# Patient Record
Sex: FEMALE | Race: WHITE | NOT HISPANIC OR LATINO | Employment: OTHER | URBAN - METROPOLITAN AREA
[De-identification: names, ages, dates, MRNs, and addresses within clinical notes are randomized per-mention and may not be internally consistent; named-entity substitution may affect disease eponyms.]

---

## 2017-05-26 ENCOUNTER — OFFICE VISIT (OUTPATIENT)
Dept: OBSTETRICS AND GYNECOLOGY | Facility: CLINIC | Age: 57
End: 2017-05-26
Payer: COMMERCIAL

## 2017-05-26 VITALS
HEIGHT: 62 IN | BODY MASS INDEX: 46.4 KG/M2 | DIASTOLIC BLOOD PRESSURE: 82 MMHG | SYSTOLIC BLOOD PRESSURE: 124 MMHG | WEIGHT: 252.13 LBS

## 2017-05-26 DIAGNOSIS — R21 RASH: ICD-10-CM

## 2017-05-26 DIAGNOSIS — N63.20 LEFT BREAST LUMP: ICD-10-CM

## 2017-05-26 DIAGNOSIS — Z01.419 ROUTINE GYNECOLOGICAL EXAMINATION: Primary | ICD-10-CM

## 2017-05-26 PROCEDURE — 99999 PR PBB SHADOW E&M-NEW PATIENT-LVL III: CPT | Mod: PBBFAC,,, | Performed by: OBSTETRICS & GYNECOLOGY

## 2017-05-26 PROCEDURE — 99386 PREV VISIT NEW AGE 40-64: CPT | Mod: S$GLB,,, | Performed by: OBSTETRICS & GYNECOLOGY

## 2017-05-26 RX ORDER — GABAPENTIN 300 MG/1
CAPSULE ORAL
Refills: 6 | COMMUNITY
Start: 2017-05-09 | End: 2020-07-28 | Stop reason: CLARIF

## 2017-05-26 RX ORDER — BLOOD SUGAR DIAGNOSTIC
STRIP MISCELLANEOUS
Refills: 3 | COMMUNITY
Start: 2017-05-06

## 2017-05-26 RX ORDER — IRBESARTAN 300 MG/1
300 TABLET ORAL DAILY
Refills: 3 | COMMUNITY
Start: 2017-04-25

## 2017-05-26 RX ORDER — AMLODIPINE BESYLATE 2.5 MG/1
TABLET ORAL
Refills: 3 | Status: ON HOLD | COMMUNITY
Start: 2017-05-20 | End: 2019-12-01 | Stop reason: HOSPADM

## 2017-05-26 RX ORDER — HYDROCHLOROTHIAZIDE 25 MG/1
25 TABLET ORAL DAILY
Refills: 3 | COMMUNITY
Start: 2017-05-09

## 2017-05-26 RX ORDER — PANTOPRAZOLE SODIUM 40 MG/1
TABLET, DELAYED RELEASE ORAL
Refills: 3 | COMMUNITY
Start: 2017-04-25 | End: 2019-01-11

## 2017-05-26 RX ORDER — LEVOTHYROXINE SODIUM 25 UG/1
25 TABLET ORAL DAILY
Refills: 3 | COMMUNITY
Start: 2017-05-06

## 2017-05-26 RX ORDER — PEN NEEDLE, DIABETIC 29 G X1/2"
NEEDLE, DISPOSABLE MISCELLANEOUS
Refills: 1 | COMMUNITY
Start: 2017-03-23

## 2017-05-26 RX ORDER — PEN NEEDLE, DIABETIC 31 GX5/16"
NEEDLE, DISPOSABLE MISCELLANEOUS
Refills: 3 | COMMUNITY
Start: 2017-03-10 | End: 2019-01-11 | Stop reason: SDUPTHER

## 2017-05-26 RX ORDER — TIZANIDINE 2 MG/1
TABLET ORAL
Refills: 0 | COMMUNITY
Start: 2017-04-25 | End: 2020-07-28 | Stop reason: CLARIF

## 2017-05-26 RX ORDER — TRAMADOL HYDROCHLORIDE 50 MG/1
TABLET ORAL
Refills: 0 | COMMUNITY
Start: 2017-05-05 | End: 2019-01-11

## 2017-05-26 RX ORDER — NYSTATIN AND TRIAMCINOLONE ACETONIDE 100000; 1 [USP'U]/G; MG/G
CREAM TOPICAL
Qty: 30 G | Refills: 1 | Status: SHIPPED | OUTPATIENT
Start: 2017-05-26 | End: 2018-05-26

## 2017-05-26 RX ORDER — GLIMEPIRIDE 4 MG/1
4 TABLET ORAL 2 TIMES DAILY
Refills: 3 | COMMUNITY
Start: 2017-05-06 | End: 2020-07-28 | Stop reason: CLARIF

## 2017-05-26 RX ORDER — LANCETS 28 GAUGE
EACH MISCELLANEOUS
Refills: 3 | COMMUNITY
Start: 2017-05-20

## 2017-05-26 RX ORDER — LEVALBUTEROL INHALATION SOLUTION 0.63 MG/3ML
SOLUTION RESPIRATORY (INHALATION)
Refills: 5 | COMMUNITY
Start: 2017-05-06 | End: 2018-03-22 | Stop reason: SDUPTHER

## 2017-05-26 RX ORDER — MELOXICAM 7.5 MG/1
TABLET ORAL
Refills: 0 | COMMUNITY
Start: 2017-05-17 | End: 2019-01-11 | Stop reason: SDUPTHER

## 2017-05-26 RX ORDER — FERROUS SULFATE 325(65) MG
TABLET ORAL
Refills: 3 | COMMUNITY
Start: 2017-05-06 | End: 2019-01-11

## 2017-05-26 RX ORDER — METFORMIN HYDROCHLORIDE 500 MG/1
500 TABLET ORAL 2 TIMES DAILY WITH MEALS
Refills: 3 | COMMUNITY
Start: 2017-05-20

## 2017-05-26 RX ORDER — INSULIN ASPART 100 [IU]/ML
INJECTION, SOLUTION INTRAVENOUS; SUBCUTANEOUS
Refills: 3 | COMMUNITY
Start: 2017-04-14 | End: 2019-06-20 | Stop reason: SDUPTHER

## 2017-05-26 RX ORDER — ATORVASTATIN CALCIUM 20 MG/1
20 TABLET, FILM COATED ORAL DAILY
Refills: 3 | COMMUNITY
Start: 2017-05-06

## 2017-05-26 NOTE — PROGRESS NOTES
"  Chief Complaint: Well Woman Exam, Decreased Libido     HPI:      Liset Bailon is a 56 y.o.  who presents for annual exam. She is currently complaining of decreased libido since her hysterectomy 14 years ago. States that she still feels in the mood "from time to time", but not as often as her . Has two adult sons living at home, one with special needs, and she does not always feel comfortable being intimate with her  when they are home. Feels much more aroused when she and her  are home along or on a trip alone. She denies vaginal dryness or pain with intercourse. Was Rx'd HRT after hysterectomy but never took the medication secondary to fears of increased cancer risks. When she does have sex with her  she does find it pleasurable.     Ms. Bailon is currently sexually active with a single male partner. She declines STD screening today. Patient does not have regular monthly menses. No LMP recorded. Patient has had a hysterectomy.    Hysterectomy 2/ AUB. No h/o abnormal pap smears.  MMG  showed a lump in left breast. Lumpectomy showed benign pathology per patient. She cannot remember where she had the mammogram done.     Past Medical History:   Diagnosis Date    Diabetes mellitus     Hyperlipidemia     Hypertension     Sleep apnea     Thyroid disease      Past Surgical History:   Procedure Laterality Date    APPENDECTOMY       SECTION      x3    HYSTERECTOMY       Social History     Social History    Marital status:      Spouse name: N/A    Number of children: N/A    Years of education: N/A     Occupational History    Not on file.     Social History Main Topics    Smoking status: Never Smoker    Smokeless tobacco: Not on file    Alcohol use Yes      Comment: occasional    Drug use: No    Sexual activity: No     Other Topics Concern    Not on file     Social History Narrative    No narrative on file     Family History   Problem Relation Age of " "Onset    Pancreatic cancer Mother     Breast cancer Neg Hx     Colon cancer Neg Hx     Ovarian cancer Neg Hx      OB History      Para Term  AB Living    3 3 3   3    SAB TAB Ectopic Multiple Live Births        3          ROS:     GENERAL: Denies unintentional weight gain or weight loss. Feeling well overall.   SKIN: Denies rash or lesions despite presence of rash on chest.  HEENT: Denies headaches, or vision changes.   CARDIOVASCULAR: Denies palpitations or chest pain.   RESPIRATORY: Denies shortness of breath or dyspnea on exertion.  BREASTS: Denies pain, lumps, or nipple discharge.   ABDOMEN: Denies abdominal pain, constipation, diarrhea, nausea, vomiting, or change in appetite.   URINARY: Denies frequency, dysuria, hematuria.  NEUROLOGIC: Denies syncope or weakness.   PSYCHIATRIC: Denies depression, anxiety or mood swings.      Physical Exam:      PHYSICAL EXAM:  /82   Ht 5' 2" (1.575 m)   Wt 114.4 kg (252 lb 1.5 oz)   BMI 46.11 kg/m²   Body mass index is 46.11 kg/m².     APPEARANCE: Well nourished, well developed, in no acute distress.  PSYCH: Appropriate mood and affect.  SKIN: No acne or hirsutism, candidal appearing rash across bilateral breasts.  NECK: Neck symmetric without masses or thyromegaly  NODES: No inguinal, axillary, or supraclavicular lymph node enlargement  CHEST: Normal respiratory effort.  ABDOMEN: Soft.  No tenderness or masses.   BREASTS: Symmetrical, pendulous.      PELVIC: Normal external genitalia without lesions.  Normal hair distribution.  Adequate perineal body, normal urethral meatus.  Vagina moist and smooth. Normal appearing vaginal cuff. No pelvic masses or tenderness palpated on BME.  No significant cystocele or rectocele.  EXTREMITIES: No edema.    Assessment/Plan:     Routine gynecological examination    Left breast lump  -     Mammo Digital Diagnostic Bilat with Danny; Future; Expected date: 2017    Rash  -     nystatin-triamcinolone (MYCOLOG II) " cream; Apply to affected area 2 times daily  Dispense: 30 g; Refill: 1          Counseling:     Patient was counseled today on current ASCCP pap guidelines, the recommendation for yearly pelvic exams, healthy diet and exercise routines, breast self awareness and annual mammograms. She is to see her PCP for other health maintenance.       Use of the [facility] Patient Portal discussed and encouraged during today's visit.

## 2017-07-20 ENCOUNTER — HOSPITAL ENCOUNTER (OUTPATIENT)
Dept: RADIOLOGY | Facility: OTHER | Age: 57
Discharge: HOME | End: 2017-07-20
Attending: OBSTETRICS & GYNECOLOGY
Payer: COMMERCIAL

## 2017-07-20 PROCEDURE — 77066 DX MAMMO INCL CAD BI: CPT | Mod: 26,,, | Performed by: RADIOLOGY

## 2017-07-20 PROCEDURE — 77062 BREAST TOMOSYNTHESIS BI: CPT | Mod: TC

## 2017-07-20 PROCEDURE — 77062 BREAST TOMOSYNTHESIS BI: CPT | Mod: 26,,, | Performed by: RADIOLOGY

## 2017-07-24 RX ORDER — FLUTICASONE PROPIONATE AND SALMETEROL 250; 50 UG/1; UG/1
1 POWDER RESPIRATORY (INHALATION) 2 TIMES DAILY
COMMUNITY
End: 2017-07-25 | Stop reason: SDUPTHER

## 2017-07-24 RX ORDER — ALBUTEROL SULFATE 90 UG/1
2 AEROSOL, METERED RESPIRATORY (INHALATION) EVERY 6 HOURS PRN
COMMUNITY
End: 2017-07-25 | Stop reason: SDUPTHER

## 2017-07-24 RX ORDER — ASPIRIN 81 MG/1
81 TABLET ORAL DAILY
Status: ON HOLD | COMMUNITY
End: 2019-12-01 | Stop reason: HOSPADM

## 2017-07-25 ENCOUNTER — OFFICE VISIT (OUTPATIENT)
Dept: PULMONOLOGY | Facility: CLINIC | Age: 57
End: 2017-07-25
Payer: COMMERCIAL

## 2017-07-25 VITALS
HEIGHT: 62 IN | SYSTOLIC BLOOD PRESSURE: 130 MMHG | DIASTOLIC BLOOD PRESSURE: 60 MMHG | OXYGEN SATURATION: 95 % | HEART RATE: 79 BPM | BODY MASS INDEX: 45.64 KG/M2 | WEIGHT: 248 LBS

## 2017-07-25 DIAGNOSIS — J96.11 CHRONIC RESPIRATORY FAILURE WITH HYPOXIA: ICD-10-CM

## 2017-07-25 DIAGNOSIS — E66.01 MORBID OBESITY, UNSPECIFIED OBESITY TYPE: ICD-10-CM

## 2017-07-25 DIAGNOSIS — Z78.9 NONSMOKER: ICD-10-CM

## 2017-07-25 DIAGNOSIS — J45.30 MILD PERSISTENT ASTHMA WITHOUT COMPLICATION: Primary | ICD-10-CM

## 2017-07-25 DIAGNOSIS — G47.33 OBSTRUCTIVE SLEEP APNEA SYNDROME: ICD-10-CM

## 2017-07-25 PROCEDURE — 99214 OFFICE O/P EST MOD 30 MIN: CPT | Mod: ,,, | Performed by: INTERNAL MEDICINE

## 2017-07-25 RX ORDER — ALBUTEROL SULFATE 90 UG/1
2 AEROSOL, METERED RESPIRATORY (INHALATION) EVERY 6 HOURS PRN
Qty: 18 G | Refills: 6 | Status: SHIPPED | OUTPATIENT
Start: 2017-07-25 | End: 2019-06-20 | Stop reason: SDUPTHER

## 2017-07-25 RX ORDER — FLUTICASONE PROPIONATE AND SALMETEROL 250; 50 UG/1; UG/1
1 POWDER RESPIRATORY (INHALATION) 2 TIMES DAILY
Qty: 1 EACH | Refills: 6 | Status: SHIPPED | OUTPATIENT
Start: 2017-07-25 | End: 2018-09-24 | Stop reason: SDUPTHER

## 2017-07-25 NOTE — PROGRESS NOTES
"HPI:    Here for follow up, overall doing well, no recent problems with her asthma, reports good compliance and benefit with her CPAP.    Pt asthma is currently stable with no increases in SOB, SOARES or wheezing.  There has been no increase in use of  rescue medications.  Have reviewed medications with pt including the roles of rescue and controlling medications.  All questions answered.  Pt reports no problems with technique with inhalers.  We discussed the possibility of de-escalation of therapy if asthma control remains stable.    ACT - 20    CPAP Therapy    CPAP therapy - 10    Date of sleep study - na RDI - na    Pt reports good compliance and benefit with the therapy.    Face to face visit with pt concerning their CPAP therapy.  I have stressed continued compliance with the treatment and all questions were answered.    Home Oxygen    Face to face visit with pt regarding their home oxygen.  We have discussed the need for oxygen including continuous use, prn use or night time use (as appropriate for the pt).  We discussed the need for compliance with the therapy. We will do recertifications as necessary.     Medications    Current Outpatient Prescriptions:     albuterol (PROAIR HFA) 90 mcg/actuation inhaler, Inhale 2 puffs into the lungs every 6 (six) hours as needed for Wheezing. Rescue, Disp: 18 g, Rfl: 6    amlodipine (NORVASC) 2.5 MG tablet, TK 1 T PO BID, Disp: , Rfl: 3    aspirin (ECOTRIN) 81 MG EC tablet, Take 81 mg by mouth once daily., Disp: , Rfl:     atorvastatin (LIPITOR) 20 MG tablet, TK 1 T PO QPM, Disp: , Rfl: 3    BD INSULIN SYRINGE ULTRA-FINE 0.5 mL 31 gauge x 5/16 Syrg, USE TO INJECT 4X DAILY, Disp: , Rfl: 1    BD ULTRA-FINE SUSHANT PEN NEEDLES 32 gauge x 5/32" Ndle, USE TID, Disp: , Rfl: 3    ferrous sulfate 325 mg (65 mg iron) Tab tablet, TK 1 T PO QD, Disp: , Rfl: 3    fluticasone-salmeterol 250-50 mcg/dose (ADVAIR) 250-50 mcg/dose diskus inhaler, Inhale 1 puff into the lungs 2 (two) " times daily. Controller, Disp: 1 each, Rfl: 6    FREESTYLE INSULINX TEST STRIPS Strp, TEST BLOOD SUGAR QID, Disp: , Rfl: 3    FREESTYLE LANCETS 28 gauge lancets, TEST QID, Disp: , Rfl: 3    gabapentin (NEURONTIN) 300 MG capsule, TAKE ONE CAPSULE BY MOUTH IN THE MORNING, ONE CAPSULE AT NOON, AND 2 CAPSULES BEFORE BED, Disp: , Rfl: 6    glimepiride (AMARYL) 4 MG tablet, TK 1 T PO D, Disp: , Rfl: 3    hydrochlorothiazide (HYDRODIURIL) 25 MG tablet, TK 1 T PO D, Disp: , Rfl: 3    irbesartan (AVAPRO) 300 MG tablet, TK 1 T PO D, Disp: , Rfl: 3    levalbuterol (XOPENEX) 0.63 mg/3 mL nebulizer solution, , Disp: , Rfl: 5    levothyroxine (SYNTHROID) 25 MCG tablet, TK 1 T PO D, Disp: , Rfl: 3    meloxicam (MOBIC) 7.5 MG tablet, TK 1 T PO D, Disp: , Rfl: 0    metformin (GLUCOPHAGE) 500 MG tablet, TK 1 T PO BID, Disp: , Rfl: 3    NOVOLOG 100 unit/mL injection, INJECT 15 UNITS INTO THE SKIN AFTER EACH MEAL., Disp: , Rfl: 3    nystatin-triamcinolone (MYCOLOG II) cream, Apply to affected area 2 times daily, Disp: 30 g, Rfl: 1    pantoprazole (PROTONIX) 40 MG tablet, TK 1 T PO D, Disp: , Rfl: 3    tizanidine (ZANAFLEX) 2 MG tablet, TK 1 T PO Q 8 H PRN, Disp: , Rfl: 0    tramadol (ULTRAM) 50 mg tablet, TK 1 T PO   D, Disp: , Rfl: 0    Allergies  Review of patient's allergies indicates:   Allergen Reactions    Benadryl allergy decongestant     Codeine        Social History    History   Smoking Status    Never Smoker   Smokeless Tobacco    Never Used     ETOH - 1-2 drinks per week.  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   HENT: Negative for congestion.    Eyes: Negative for pain.  "  Respiratory: Negative for cough, hemoptysis, sputum production, shortness of breath, wheezing and stridor.    Cardiovascular: Negative for chest pain, palpitations, orthopnea, claudication, leg swelling and PND.   Gastrointestinal: Negative for abdominal pain, constipation, diarrhea, heartburn, nausea and vomiting.   Genitourinary: Negative for dysuria, frequency and urgency.   Musculoskeletal: Negative for falls and myalgias.   Neurological: Negative for sensory change, focal weakness and weakness.   Psychiatric/Behavioral: Negative for depression. The patient is not nervous/anxious.        Physical Exam    Vitals:    07/25/17 1326   BP: 130/60   Pulse: 79   SpO2: 95%   Weight: 112.5 kg (248 lb)   Height: 5' 2" (1.575 m)       Physical Exam   Constitutional: She is oriented to person, place, and time. She appears well-developed and well-nourished. No distress.   obese   HENT:   Head: Normocephalic and atraumatic.   Right Ear: External ear normal.   Left Ear: External ear normal.   Nose: Nose normal.   Mouth/Throat: Oropharynx is clear and moist.   Eyes: Conjunctivae and EOM are normal. Pupils are equal, round, and reactive to light.   Neck: Normal range of motion. Neck supple. No JVD present. No tracheal deviation present. No thyromegaly present.   Cardiovascular: Normal rate, regular rhythm and intact distal pulses.  Exam reveals no gallop and no friction rub.    Murmur heard.  2/6 REMIGIO   Pulmonary/Chest: Effort normal and breath sounds normal. No stridor. No respiratory distress. She has no wheezes. She has no rales. She exhibits no tenderness.   Abdominal: Soft. Bowel sounds are normal. She exhibits no distension. There is no tenderness.   Musculoskeletal: Normal range of motion. She exhibits no edema or tenderness.   Lymphadenopathy:     She has no cervical adenopathy.   Neurological: She is alert and oriented to person, place, and time. No cranial nerve deficit.   Skin: Skin is warm and dry. She is not " diaphoretic.   Psychiatric: She has a normal mood and affect. Her behavior is normal.   Nursing note and vitals reviewed.      Lab      Xray  Imaging Results    None         Impression/Plan  Problem List Items Addressed This Visit        Neuro    Obstructive sleep apnea syndrome  - stable with present therapy       Pulmonary    Chronic respiratory failure with hypoxia  - continue with O2 prn and qhs    Mild persistent asthma without complication - Primary  - doing well on present treatments  - RTC 6 months    Relevant Medications    albuterol (PROAIR HFA) 90 mcg/actuation inhaler    fluticasone-salmeterol 250-50 mcg/dose (ADVAIR) 250-50 mcg/dose diskus inhaler       Fluids/Electrolytes/Nutrition/GI    Morbid obesity  - Discussed with pt at length about the need to work on diet and weight loss.  Have offered suggestions on approaches for this problem.  Also discussed the need to start a regular exercise program.  We discussed at length the importance of regular exercise and working at getting to a healthier weight.  All questions answered.  Pt voices understanding of these principles and hopefully will take action.       Other    Never smoker      Other Visit Diagnoses    None.

## 2017-09-25 ENCOUNTER — TELEPHONE (OUTPATIENT)
Dept: PULMONOLOGY | Facility: CLINIC | Age: 57
End: 2017-09-25

## 2017-09-26 RX ORDER — AZITHROMYCIN 250 MG/1
TABLET, FILM COATED ORAL
Qty: 6 TABLET | Refills: 0 | Status: SHIPPED | OUTPATIENT
Start: 2017-09-26 | End: 2018-03-12 | Stop reason: SDUPTHER

## 2018-02-02 PROBLEM — M25.512 PAIN IN JOINT OF LEFT SHOULDER REGION: Status: ACTIVE | Noted: 2018-02-02

## 2018-03-09 ENCOUNTER — TELEPHONE (OUTPATIENT)
Dept: PULMONOLOGY | Facility: CLINIC | Age: 58
End: 2018-03-09

## 2018-03-12 RX ORDER — AZITHROMYCIN 250 MG/1
TABLET, FILM COATED ORAL
Qty: 6 TABLET | Refills: 0 | Status: SHIPPED | OUTPATIENT
Start: 2018-03-12 | End: 2018-12-10 | Stop reason: SDUPTHER

## 2018-03-22 ENCOUNTER — TELEPHONE (OUTPATIENT)
Dept: PULMONOLOGY | Facility: CLINIC | Age: 58
End: 2018-03-22

## 2018-03-22 RX ORDER — LEVALBUTEROL INHALATION SOLUTION 0.63 MG/3ML
1 SOLUTION RESPIRATORY (INHALATION) 3 TIMES DAILY PRN
Qty: 1 BOX | Refills: 5 | Status: SHIPPED | OUTPATIENT
Start: 2018-03-22 | End: 2018-08-18 | Stop reason: SDUPTHER

## 2018-05-30 PROBLEM — M25.512 PAIN IN JOINT OF LEFT SHOULDER REGION: Status: RESOLVED | Noted: 2018-02-02 | Resolved: 2018-05-30

## 2018-08-20 RX ORDER — LEVALBUTEROL INHALATION SOLUTION 0.63 MG/3ML
SOLUTION RESPIRATORY (INHALATION)
Qty: 75 ML | Refills: 6 | Status: SHIPPED | OUTPATIENT
Start: 2018-08-20 | End: 2018-09-17 | Stop reason: SDUPTHER

## 2018-09-12 PROBLEM — M75.02 ADHESIVE CAPSULITIS OF LEFT SHOULDER: Status: ACTIVE | Noted: 2018-09-12

## 2018-09-17 RX ORDER — LEVALBUTEROL INHALATION SOLUTION 0.63 MG/3ML
SOLUTION RESPIRATORY (INHALATION)
Qty: 75 ML | Refills: 6 | Status: SHIPPED | OUTPATIENT
Start: 2018-09-17 | End: 2019-05-04 | Stop reason: SDUPTHER

## 2018-09-24 DIAGNOSIS — J45.30 MILD PERSISTENT ASTHMA WITHOUT COMPLICATION: ICD-10-CM

## 2018-09-25 RX ORDER — FLUTICASONE PROPIONATE AND SALMETEROL 50; 250 UG/1; UG/1
POWDER RESPIRATORY (INHALATION)
Qty: 1 EACH | Refills: 11 | Status: SHIPPED | OUTPATIENT
Start: 2018-09-25 | End: 2019-06-20 | Stop reason: SDUPTHER

## 2018-10-04 ENCOUNTER — TELEPHONE (OUTPATIENT)
Dept: PULMONOLOGY | Facility: CLINIC | Age: 58
End: 2018-10-04

## 2018-10-04 NOTE — TELEPHONE ENCOUNTER
The office cancelled todays appointment, offered the patient 10/8, booked, then called with death in her family and cancelled the 10/8/18 appt. Offered the 15th or 22nd of October, declined because on vacation, so rescheduled 11/5/18 @ 11:30. Called today wanting to change that appt also, explained at this time the next appt is Dec 26th, got upset, sis not wish to wait that long, feels like we should work her in another afternoon of her choice due to us cancelling the original appt., explained that was not possible, so at this time she will remain on 11/5/18.

## 2018-12-10 ENCOUNTER — TELEPHONE (OUTPATIENT)
Dept: PULMONOLOGY | Facility: CLINIC | Age: 58
End: 2018-12-10

## 2018-12-10 RX ORDER — AZITHROMYCIN 250 MG/1
TABLET, FILM COATED ORAL
Qty: 6 TABLET | Refills: 0 | Status: SHIPPED | OUTPATIENT
Start: 2018-12-10 | End: 2019-01-11

## 2018-12-10 RX ORDER — PREDNISONE 10 MG/1
TABLET ORAL
Qty: 18 TABLET | Refills: 0 | Status: SHIPPED | OUTPATIENT
Start: 2018-12-10 | End: 2019-01-11

## 2018-12-18 ENCOUNTER — TELEPHONE (OUTPATIENT)
Dept: OBSTETRICS AND GYNECOLOGY | Facility: CLINIC | Age: 58
End: 2018-12-18

## 2018-12-18 NOTE — TELEPHONE ENCOUNTER
Pt is scheduled for annual appt on 1/11/19 and would like mammo orders entered. She will call insurance company to see where they will cover her to have it done and call us back.

## 2019-01-08 NOTE — PROGRESS NOTES
"Chief Complaint: Well Woman Exam     HPI:      Liset Bailon is a 58 y.o.  who presents today for well woman exam.  LMP: No LMP recorded. Patient has had a hysterectomy.  No issues, problems, or complaints. Specifically, patient denies abnormal vaginal bleeding, discharge, pelvic pain, urinary problems, or changes in appetite. Ms. Bailon is currently sexually active with a single male partner.    Previous Pap:  Hysterectomy for benign indications, has never had an abnormal pap (No result found)  Previous Mammogram: [facility] 2 (2017)  Colonoscopy: 5+ years ago at , polyps removed    OB History      Para Term  AB Living    3 3 3     3    SAB TAB Ectopic Multiple Live Births            3        ROS:     GENERAL: Denies unintentional weight gain or weight loss. Feeling well overall.   SKIN: Denies rash or lesions.   HEENT: Denies headaches, or vision changes.   CARDIOVASCULAR: Denies palpitations or chest pain.   RESPIRATORY: Denies shortness of breath or dyspnea on exertion.  BREASTS: Denies pain, lumps, or nipple discharge.   ABDOMEN: Denies abdominal pain, constipation, diarrhea, nausea, vomiting, change in appetite.  URINARY: Denies frequency, dysuria, hematuria.  NEUROLOGIC: Denies syncope or weakness.   PSYCHIATRIC: Denies depression, anxiety or mood swings.    Physical Exam:      PHYSICAL EXAM:  /84   Ht 5' 2" (1.575 m)   Wt 120 kg (264 lb 7.1 oz)   BMI 48.37 kg/m²   Body mass index is 48.37 kg/m².     APPEARANCE: Well nourished, well developed, in no acute distress.  PSYCH: Appropriate mood and affect.  SKIN: No acne or hirsutism  NECK: Neck symmetric without masses or thyromegaly  NODES: No inguinal, axillary, or supraclavicular lymph node enlargement  ABDOMEN: Soft.  No tenderness or masses.    CARDIOVASCULAR: No edema of peripheral extremities  BREASTS: Symmetrical. Both breasts with diffuse erythema patches and scaly peeling skin, appears c/w yeast. No palpable masses or nipple " discharge bilaterally.  PELVIC: Normal external genitalia without lesions.  Normal hair distribution.  Adequate perineal body, normal urethral meatus.  Vagina moist and well rugated without lesions or discharge. Normal appearance of vaginal cuff. No significant cystocele or rectocele.  Bimanual exam severely limited by body habitus, but no adnexal tenderness noted.     Assessment/Plan:     Encounter for annual routine gynecological examination    Breast cancer screening  -     Mammo Digital Screening Bilat w/ Danny; Future; Expected date: 01/11/2019    Yeast dermatitis  -     nystatin-triamcinolone (MYCOLOG II) cream; Apply to affected area 2 times daily  Dispense: 30 g; Refill: 1      Counseling:     Patient was counseled today on current ASCCP pap guidelines, the recommendation for yearly pelvic exams, healthy diet and exercise routines, breast self awareness and annual mammograms.She is to see her PCP for other health maintenance.     Use of the [employer] Patient Portal discussed and encouraged during today's visit.

## 2019-01-11 ENCOUNTER — OFFICE VISIT (OUTPATIENT)
Dept: OBSTETRICS AND GYNECOLOGY | Facility: CLINIC | Age: 59
End: 2019-01-11
Payer: COMMERCIAL

## 2019-01-11 VITALS
HEIGHT: 62 IN | DIASTOLIC BLOOD PRESSURE: 84 MMHG | SYSTOLIC BLOOD PRESSURE: 132 MMHG | BODY MASS INDEX: 48.66 KG/M2 | WEIGHT: 264.44 LBS

## 2019-01-11 DIAGNOSIS — Z12.39 BREAST CANCER SCREENING: ICD-10-CM

## 2019-01-11 DIAGNOSIS — Z01.419 ENCOUNTER FOR ANNUAL ROUTINE GYNECOLOGICAL EXAMINATION: Primary | ICD-10-CM

## 2019-01-11 DIAGNOSIS — B37.2 YEAST DERMATITIS: ICD-10-CM

## 2019-01-11 PROCEDURE — 99999 PR PBB SHADOW E&M-EST. PATIENT-LVL III: ICD-10-PCS | Mod: PBBFAC,,, | Performed by: OBSTETRICS & GYNECOLOGY

## 2019-01-11 PROCEDURE — 99396 PR PREVENTIVE VISIT,EST,40-64: ICD-10-PCS | Mod: S$GLB,,, | Performed by: OBSTETRICS & GYNECOLOGY

## 2019-01-11 PROCEDURE — 99396 PREV VISIT EST AGE 40-64: CPT | Mod: S$GLB,,, | Performed by: OBSTETRICS & GYNECOLOGY

## 2019-01-11 PROCEDURE — 99999 PR PBB SHADOW E&M-EST. PATIENT-LVL III: CPT | Mod: PBBFAC,,, | Performed by: OBSTETRICS & GYNECOLOGY

## 2019-01-11 RX ORDER — TIZANIDINE HYDROCHLORIDE 2 MG/1
CAPSULE, GELATIN COATED ORAL
COMMUNITY
End: 2019-01-11 | Stop reason: SDUPTHER

## 2019-01-11 RX ORDER — SULFAMETHOXAZOLE AND TRIMETHOPRIM 800; 160 MG/1; MG/1
TABLET ORAL
COMMUNITY
End: 2019-01-11

## 2019-01-11 RX ORDER — CYCLOBENZAPRINE HCL 5 MG
TABLET ORAL
COMMUNITY
End: 2019-01-11 | Stop reason: SDUPTHER

## 2019-01-11 RX ORDER — INSULIN GLARGINE 100 [IU]/ML
35 INJECTION, SOLUTION SUBCUTANEOUS DAILY
COMMUNITY

## 2019-01-11 RX ORDER — DICLOFENAC EPOLAMINE 0.01 G/1
SYSTEM TOPICAL
COMMUNITY
End: 2019-01-11

## 2019-01-11 RX ORDER — LANCETS 28 GAUGE
EACH MISCELLANEOUS
COMMUNITY

## 2019-01-11 RX ORDER — IRBESARTAN 300 MG/1
TABLET ORAL
COMMUNITY
End: 2019-01-11 | Stop reason: SDUPTHER

## 2019-01-11 RX ORDER — CYCLOBENZAPRINE HCL 10 MG
TABLET ORAL
Refills: 0 | COMMUNITY
Start: 2018-10-30 | End: 2019-01-11 | Stop reason: SDUPTHER

## 2019-01-11 RX ORDER — DIAZEPAM 5 MG/1
TABLET ORAL
Status: ON HOLD | COMMUNITY
End: 2019-12-01 | Stop reason: HOSPADM

## 2019-01-11 RX ORDER — PANTOPRAZOLE SODIUM 40 MG/1
TABLET, DELAYED RELEASE ORAL
COMMUNITY
End: 2019-01-11

## 2019-01-11 RX ORDER — VALACYCLOVIR HYDROCHLORIDE 1 G/1
TABLET, FILM COATED ORAL
COMMUNITY
End: 2019-01-11

## 2019-01-11 RX ORDER — OMEPRAZOLE 20 MG/1
20 CAPSULE, DELAYED RELEASE ORAL DAILY
Refills: 1 | COMMUNITY
Start: 2018-12-30

## 2019-01-11 RX ORDER — INSULIN ASPART 100 [IU]/ML
INJECTION, SOLUTION INTRAVENOUS; SUBCUTANEOUS
COMMUNITY
End: 2020-07-28 | Stop reason: CLARIF

## 2019-01-11 RX ORDER — NYSTATIN AND TRIAMCINOLONE ACETONIDE 100000; 1 [USP'U]/G; MG/G
CREAM TOPICAL
COMMUNITY
End: 2019-01-11 | Stop reason: SDUPTHER

## 2019-01-11 RX ORDER — INSULIN GLARGINE 100 [IU]/ML
INJECTION, SOLUTION SUBCUTANEOUS
Refills: 7 | COMMUNITY
Start: 2018-11-24 | End: 2020-07-28 | Stop reason: CLARIF

## 2019-01-11 RX ORDER — INSULIN GLARGINE 100 [IU]/ML
INJECTION, SOLUTION SUBCUTANEOUS
COMMUNITY
End: 2019-06-20

## 2019-01-11 RX ORDER — ONDANSETRON 4 MG/1
TABLET, FILM COATED ORAL
COMMUNITY
End: 2019-01-11

## 2019-01-11 RX ORDER — GABAPENTIN 300 MG/1
CAPSULE ORAL
COMMUNITY
End: 2019-01-11 | Stop reason: SDUPTHER

## 2019-01-11 RX ORDER — ATORVASTATIN CALCIUM 20 MG/1
TABLET, FILM COATED ORAL
COMMUNITY
End: 2019-01-11

## 2019-01-11 RX ORDER — ERGOCALCIFEROL 1.25 MG/1
CAPSULE ORAL
COMMUNITY
End: 2020-07-28 | Stop reason: CLARIF

## 2019-01-11 RX ORDER — ALBUTEROL SULFATE 90 UG/1
AEROSOL, METERED RESPIRATORY (INHALATION)
COMMUNITY
End: 2019-01-11 | Stop reason: SDUPTHER

## 2019-01-11 RX ORDER — FLUTICASONE PROPIONATE AND SALMETEROL 250; 50 UG/1; UG/1
POWDER RESPIRATORY (INHALATION)
COMMUNITY
End: 2019-01-11 | Stop reason: SDUPTHER

## 2019-01-11 RX ORDER — BENZONATATE 200 MG/1
CAPSULE ORAL
COMMUNITY
End: 2019-01-11 | Stop reason: SDUPTHER

## 2019-01-11 RX ORDER — METFORMIN HYDROCHLORIDE 500 MG/1
TABLET ORAL
COMMUNITY
End: 2019-01-11 | Stop reason: SDUPTHER

## 2019-01-11 RX ORDER — DOXYCYCLINE HYCLATE 100 MG
TABLET ORAL
Status: ON HOLD | COMMUNITY
End: 2019-12-01 | Stop reason: HOSPADM

## 2019-01-11 RX ORDER — FLUTICASONE PROPIONATE 50 MCG
SPRAY, SUSPENSION (ML) NASAL
COMMUNITY
End: 2019-01-11

## 2019-01-11 RX ORDER — NYSTATIN AND TRIAMCINOLONE ACETONIDE 100000; 1 [USP'U]/G; MG/G
CREAM TOPICAL
Qty: 30 G | Refills: 1 | Status: SHIPPED | OUTPATIENT
Start: 2019-01-11 | End: 2020-07-28 | Stop reason: CLARIF

## 2019-01-11 RX ORDER — MELOXICAM 15 MG/1
TABLET ORAL
COMMUNITY
End: 2020-07-28 | Stop reason: CLARIF

## 2019-01-11 RX ORDER — LIRAGLUTIDE 6 MG/ML
INJECTION SUBCUTANEOUS
Refills: 3 | COMMUNITY
Start: 2018-12-27 | End: 2019-06-20 | Stop reason: SDUPTHER

## 2019-01-11 RX ORDER — HYDROCHLOROTHIAZIDE 25 MG/1
TABLET ORAL
COMMUNITY
End: 2019-01-11

## 2019-01-11 RX ORDER — CEPHALEXIN 500 MG/1
CAPSULE ORAL
COMMUNITY
End: 2019-01-11

## 2019-01-11 RX ORDER — TRAMADOL HYDROCHLORIDE 50 MG/1
TABLET ORAL
COMMUNITY
End: 2019-01-11

## 2019-01-11 RX ORDER — IBUPROFEN AND FAMOTIDINE 26.6; 8 MG/1; MG/1
TABLET ORAL
COMMUNITY
End: 2019-01-11

## 2019-01-11 RX ORDER — LEVOCETIRIZINE DIHYDROCHLORIDE 5 MG/1
TABLET, FILM COATED ORAL
COMMUNITY
End: 2019-01-11

## 2019-01-11 RX ORDER — AMOXICILLIN 875 MG/1
TABLET, FILM COATED ORAL
COMMUNITY
End: 2019-01-11

## 2019-01-11 RX ORDER — GABAPENTIN 100 MG/1
CAPSULE ORAL
COMMUNITY
End: 2019-01-11

## 2019-01-11 RX ORDER — NAPROXEN SODIUM 220 MG
TABLET ORAL
COMMUNITY

## 2019-01-11 RX ORDER — HYDROCODONE BITARTRATE AND ACETAMINOPHEN 5; 325 MG/1; MG/1
TABLET ORAL
COMMUNITY
End: 2019-01-11

## 2019-01-11 RX ORDER — ERGOCALCIFEROL 1.25 MG/1
CAPSULE ORAL
Refills: 2 | COMMUNITY
Start: 2019-01-04 | End: 2019-01-11 | Stop reason: SDUPTHER

## 2019-01-11 RX ORDER — BENZONATATE 100 MG/1
CAPSULE ORAL
COMMUNITY
End: 2020-07-28 | Stop reason: CLARIF

## 2019-01-11 RX ORDER — AMLODIPINE BESYLATE 2.5 MG/1
TABLET ORAL
COMMUNITY
End: 2019-01-11 | Stop reason: SDUPTHER

## 2019-01-11 RX ORDER — FERROUS SULFATE 325(65) MG
TABLET ORAL
COMMUNITY
End: 2019-01-11

## 2019-01-11 RX ORDER — INSULIN ASPART 100 [IU]/ML
INJECTION, SOLUTION INTRAVENOUS; SUBCUTANEOUS
COMMUNITY
End: 2019-01-11 | Stop reason: SDUPTHER

## 2019-01-11 RX ORDER — LEVALBUTEROL INHALATION SOLUTION 0.63 MG/3ML
SOLUTION RESPIRATORY (INHALATION)
COMMUNITY
End: 2019-01-11 | Stop reason: SDUPTHER

## 2019-01-11 RX ORDER — CYCLOBENZAPRINE HCL 10 MG
TABLET ORAL
COMMUNITY
End: 2020-07-28 | Stop reason: CLARIF

## 2019-01-11 RX ORDER — PEN NEEDLE, DIABETIC 30 GX3/16"
NEEDLE, DISPOSABLE MISCELLANEOUS
COMMUNITY

## 2019-01-11 RX ORDER — INSULIN ASPART 100 [IU]/ML
INJECTION, SOLUTION INTRAVENOUS; SUBCUTANEOUS
Refills: 0 | COMMUNITY
Start: 2018-12-31 | End: 2019-06-20 | Stop reason: SDUPTHER

## 2019-01-11 RX ORDER — LEVOTHYROXINE SODIUM 25 UG/1
TABLET ORAL
COMMUNITY
End: 2019-01-11 | Stop reason: SDUPTHER

## 2019-01-11 RX ORDER — IBUPROFEN 800 MG/1
TABLET ORAL
COMMUNITY
End: 2019-01-11

## 2019-01-11 RX ORDER — MELOXICAM 7.5 MG/1
TABLET ORAL
COMMUNITY
End: 2019-01-11 | Stop reason: SDUPTHER

## 2019-01-11 RX ORDER — AZITHROMYCIN 250 MG/1
TABLET, FILM COATED ORAL
COMMUNITY
End: 2019-01-11

## 2019-01-11 RX ORDER — PREDNISONE 20 MG/1
TABLET ORAL
COMMUNITY
End: 2019-01-11

## 2019-01-11 RX ORDER — TIZANIDINE 2 MG/1
TABLET ORAL
COMMUNITY
End: 2019-01-11 | Stop reason: SDUPTHER

## 2019-01-11 RX ORDER — GLIMEPIRIDE 4 MG/1
TABLET ORAL
COMMUNITY
End: 2019-01-11 | Stop reason: SDUPTHER

## 2019-01-15 ENCOUNTER — HOSPITAL ENCOUNTER (OUTPATIENT)
Dept: RADIOLOGY | Facility: OTHER | Age: 59
Discharge: HOME | End: 2019-01-15
Attending: OBSTETRICS & GYNECOLOGY
Payer: COMMERCIAL

## 2019-01-15 DIAGNOSIS — Z12.39 BREAST CANCER SCREENING: ICD-10-CM

## 2019-01-15 PROCEDURE — 77067 SCR MAMMO BI INCL CAD: CPT | Mod: TC

## 2019-01-15 PROCEDURE — 77063 MAMMO DIGITAL SCREENING BILAT WITH TOMOSYNTHESIS_CAD: ICD-10-PCS | Mod: 26,,, | Performed by: INTERNAL MEDICINE

## 2019-01-15 PROCEDURE — 77067 MAMMO DIGITAL SCREENING BILAT WITH TOMOSYNTHESIS_CAD: ICD-10-PCS | Mod: 26,,, | Performed by: INTERNAL MEDICINE

## 2019-01-15 PROCEDURE — 77067 SCR MAMMO BI INCL CAD: CPT | Mod: 26,,, | Performed by: INTERNAL MEDICINE

## 2019-01-15 PROCEDURE — 77063 BREAST TOMOSYNTHESIS BI: CPT | Mod: 26,,, | Performed by: INTERNAL MEDICINE

## 2019-01-16 ENCOUNTER — TELEPHONE (OUTPATIENT)
Dept: OBSTETRICS AND GYNECOLOGY | Facility: CLINIC | Age: 59
End: 2019-01-16

## 2019-01-16 NOTE — TELEPHONE ENCOUNTER
----- Message from Nivia Chang MD sent at 1/16/2019  9:44 AM CST -----  Please call patient and let her know that her mammogram was normal. We recommend repeat mammogram screening in one year.

## 2019-01-17 ENCOUNTER — TELEPHONE (OUTPATIENT)
Dept: OBSTETRICS AND GYNECOLOGY | Facility: CLINIC | Age: 59
End: 2019-01-17

## 2019-01-17 NOTE — TELEPHONE ENCOUNTER
1-16-19:   LMOR to return my call     ----- Message from Nivia Chang MD sent at 1/16/2019  9:44 AM CST -----  Please call patient and let her know that her mammogram was normal. We recommend repeat mammogram screening in one year.

## 2019-01-17 NOTE — LETTER
January 29, 2019    Liset Bailon  [address]  [place] [address]             [facility]  [address]  [facility] [address]  Phone: [phone]  Fax: [phone] January 29, 2019     No Recipients    Patient: Liset Bailon   [address] [place] [address]   YOB: 1960   Date of Visit: 1/17/2019       Dear Ms. Bailon,    I have tried to contact you several times by leaving a message, with no response.      Your mammogram did not show any significant findings,  according to the radiologists interpretation.     Which means your Mammogram was Normal     Please remember that some cancers (about 10-15%) cannot be  found by mammography alone, and that early detection  requires a combination of monthly self-examination, yearly  physical examination, and periodic mammography.    Please continue regular breast self-examinations and report  any changes that concern you, even before your next  appointment.  If you have any further questions, please  contact your doctor.      Sincerely,      Nivia Chang MD

## 2019-01-22 PROBLEM — M75.02 ADHESIVE CAPSULITIS OF LEFT SHOULDER: Status: RESOLVED | Noted: 2018-09-12 | Resolved: 2019-01-22

## 2019-01-29 NOTE — TELEPHONE ENCOUNTER
Pt returning Nilda's call, I informed her of normal mammo results, pt verbalized understanding and says she already received the letter saying it was normal.

## 2019-01-30 ENCOUNTER — TELEPHONE (OUTPATIENT)
Dept: PULMONOLOGY | Facility: CLINIC | Age: 59
End: 2019-01-30

## 2019-01-30 RX ORDER — AZITHROMYCIN 250 MG/1
TABLET, FILM COATED ORAL
Qty: 6 TABLET | Refills: 0 | Status: ON HOLD | OUTPATIENT
Start: 2019-01-30 | End: 2019-08-13 | Stop reason: HOSPADM

## 2019-05-06 RX ORDER — LEVALBUTEROL INHALATION SOLUTION 0.63 MG/3ML
SOLUTION RESPIRATORY (INHALATION)
Qty: 75 ML | Refills: 6 | Status: SHIPPED | OUTPATIENT
Start: 2019-05-06 | End: 2019-06-20 | Stop reason: SDUPTHER

## 2019-06-20 ENCOUNTER — OFFICE VISIT (OUTPATIENT)
Dept: PULMONOLOGY | Facility: CLINIC | Age: 59
End: 2019-06-20
Payer: COMMERCIAL

## 2019-06-20 VITALS
HEART RATE: 92 BPM | HEIGHT: 62 IN | WEIGHT: 269 LBS | DIASTOLIC BLOOD PRESSURE: 78 MMHG | SYSTOLIC BLOOD PRESSURE: 140 MMHG | OXYGEN SATURATION: 93 % | BODY MASS INDEX: 49.5 KG/M2

## 2019-06-20 DIAGNOSIS — E66.01 MORBID OBESITY: ICD-10-CM

## 2019-06-20 DIAGNOSIS — J45.30 MILD PERSISTENT ASTHMA WITHOUT COMPLICATION: Primary | ICD-10-CM

## 2019-06-20 DIAGNOSIS — J96.11 CHRONIC RESPIRATORY FAILURE WITH HYPOXIA: ICD-10-CM

## 2019-06-20 DIAGNOSIS — Z78.9 NONSMOKER: ICD-10-CM

## 2019-06-20 DIAGNOSIS — G47.33 OBSTRUCTIVE SLEEP APNEA SYNDROME: ICD-10-CM

## 2019-06-20 PROCEDURE — 99214 OFFICE O/P EST MOD 30 MIN: CPT | Mod: ,,, | Performed by: INTERNAL MEDICINE

## 2019-06-20 PROCEDURE — 99214 PR OFFICE/OUTPT VISIT, EST, LEVL IV, 30-39 MIN: ICD-10-PCS | Mod: ,,, | Performed by: INTERNAL MEDICINE

## 2019-06-20 RX ORDER — LEVALBUTEROL INHALATION SOLUTION 0.63 MG/3ML
SOLUTION RESPIRATORY (INHALATION)
Qty: 75 ML | Refills: 11 | Status: SHIPPED | OUTPATIENT
Start: 2019-06-20 | End: 2020-07-28 | Stop reason: CLARIF

## 2019-06-20 RX ORDER — ALBUTEROL SULFATE 90 UG/1
2 AEROSOL, METERED RESPIRATORY (INHALATION) EVERY 6 HOURS PRN
Qty: 18 G | Refills: 11 | Status: SHIPPED | OUTPATIENT
Start: 2019-06-20 | End: 2022-03-03 | Stop reason: SDUPTHER

## 2019-06-20 RX ORDER — FLUTICASONE PROPIONATE AND SALMETEROL 250; 50 UG/1; UG/1
POWDER RESPIRATORY (INHALATION)
Qty: 1 EACH | Refills: 11 | Status: SHIPPED | OUTPATIENT
Start: 2019-06-20 | End: 2022-03-03 | Stop reason: SDUPTHER

## 2019-06-20 NOTE — PROGRESS NOTES
HPI:    7/25/2017 - Here for follow up, overall doing well, no recent problems with her asthma, reports good compliance and benefit with her CPAP.    Pt asthma is currently stable with no increases in SOB, SOARES or wheezing.  There has been no increase in use of  rescue medications.  Have reviewed medications with pt including the roles of rescue and controlling medications.  All questions answered.  Pt reports no problems with technique with inhalers.  We discussed the possibility of de-escalation of therapy if asthma control remains stable.    ACT - 20    6/20/2019 - Here for follow up, Pt asthma is currently stable with no increases in SOB, SOARES or wheezing.  There has been no increase in use of  rescue medications.  Have reviewed medications with pt including the roles of rescue and controlling medications.  All questions answered.  Pt reports no problems with technique with inhalers.  We discussed the possibility of de-escalation of therapy if asthma control remains stable.  Has not been in hospital since the last time I saw her.  Going on a cruise in August.  Has gained about 21# (d/w her)     ACT - 20      CPAP Therapy    CPAP therapy - 10    Date of sleep study - na RDI - na    Pt reports good compliance and benefit with the therapy.    Face to face visit with pt concerning their CPAP therapy.  I have stressed continued compliance with the treatment and all questions were answered.    Home Oxygen 2 - 3 LPM uses qhs and prn    Face to face visit with pt regarding their home oxygen.  We have discussed the need for oxygen including continuous use, prn use or night time use (as appropriate for the pt).  We discussed the need for compliance with the therapy. We will do recertifications as necessary.     Nebulizer    Face to face visit regarding home nebulizer use.    I have discussed with the pt regarding the need for home nebulizer therapy.  I have stressed the need for compliance with the therapy and the need to  let me know if there are any issues with the medication.  All questions answered.      Medications    Current Outpatient Medications:     ADVAIR DISKUS 250-50 mcg/dose diskus inhaler, INHALE ONE PUFF INTO THE LUNGS TWICE DAILY, Disp: 1 each, Rfl: 11    albuterol (PROAIR HFA) 90 mcg/actuation inhaler, Inhale 2 puffs into the lungs every 6 (six) hours as needed for Wheezing. Rescue, Disp: 18 g, Rfl: 6    amlodipine (NORVASC) 2.5 MG tablet, TK 1 T PO BID, Disp: , Rfl: 3    aspirin (ECOTRIN) 81 MG EC tablet, Take 81 mg by mouth once daily., Disp: , Rfl:     atorvastatin (LIPITOR) 20 MG tablet, TK 1 T PO QPM, Disp: , Rfl: 3    BASAGLAR KWIKPEN U-100 INSULIN glargine 100 units/mL (3mL) SubQ pen, INJECT SUBCUTANEOUS 35 UNITS EVERY MORNING, Disp: , Rfl: 7    BD INSULIN SYRINGE ULTRA-FINE 0.5 mL 31 gauge x 5/16 Syrg, USE TO INJECT 4X DAILY, Disp: , Rfl: 1    blood sugar diagnostic (FREESTYLE LITE STRIPS MISC), FreeStyle Lite Strips  USE UTD QID, Disp: , Rfl:     ergocalciferol (ERGOCALCIFEROL) 50,000 unit Cap, ergocalciferol (vitamin D2) 50,000 unit capsule, Disp: , Rfl:     FREESTYLE INSULINX TEST STRIPS Strp, TEST BLOOD SUGAR QID, Disp: , Rfl: 3    FREESTYLE LANCETS 28 gauge lancets, TEST QID, Disp: , Rfl: 3    gabapentin (NEURONTIN) 300 MG capsule, TAKE ONE CAPSULE BY MOUTH IN THE MORNING, ONE CAPSULE AT NOON, AND 2 CAPSULES BEFORE BED, Disp: , Rfl: 6    glimepiride (AMARYL) 4 MG tablet, TK 1 T PO D, Disp: , Rfl: 3    hydrochlorothiazide (HYDRODIURIL) 25 MG tablet, TK 1 T PO D, Disp: , Rfl: 3    hylan g-f 20 (SYNVISC-ONE) 48 mg/6 mL Syrg, 6 mLs., Disp: , Rfl:     insulin (BASAGLAR KWIKPEN U-100 INSULIN) glargine 100 units/mL (3mL) SubQ pen, Basaglar KwikPen U-100 Insulin 100 unit/mL (3 mL) subcutaneous, Disp: , Rfl:     insulin aspart U-100 (NOVOLOG FLEXPEN U-100 INSULIN) 100 unit/mL InPn pen, Novolog Flexpen U-100 Insulin aspart 100 unit/mL subcutaneous  INJECT 15 UNITS SUBCUTANEOUS THREE TIMES A DAY,  "Disp: , Rfl:     insulin syringe-needle U-100 (BD INSULIN SYRINGE ULTRA-FINE) 0.5 mL 31 gauge x 5/16" Syrg, BD Insulin Syringe Ultra-Fine 0.5 mL 31 gauge x 5/16", Disp: , Rfl:     irbesartan (AVAPRO) 300 MG tablet, TK 1 T PO D, Disp: , Rfl: 3    lancets (FREESTYLE LANCETS) 28 gauge Misc, FreeStyle Lancets 28 gauge, Disp: , Rfl:     levalbuterol (XOPENEX) 0.63 mg/3 mL nebulizer solution, USE 1 VIAL VIA NEBULIZER THREE TIMES DAILY AS NEEDED FOR WHEEZING, Disp: 75 mL, Rfl: 6    levothyroxine (SYNTHROID) 25 MCG tablet, TK 1 T PO D, Disp: , Rfl: 3    liraglutide 0.6 mg/0.1 mL, 18 mg/3 mL, subq PNIJ (VICTOZA 3-KAM) 0.6 mg/0.1 mL (18 mg/3 mL) PnIj, Victoza 3-Kam 0.6 mg/0.1 mL (18 mg/3 mL) subcutaneous pen injector, Disp: , Rfl:     meloxicam (MOBIC) 15 MG tablet, meloxicam 15 mg tablet, Disp: , Rfl:     metformin (GLUCOPHAGE) 500 MG tablet, TK 1 T PO BID, Disp: , Rfl: 3    omeprazole (PRILOSEC) 20 MG capsule, TK 1 C PO D PRN, Disp: , Rfl: 1    pen needle, diabetic (BD ULTRA-FINE SUSHANT PEN NEEDLE) 32 gauge x 5/32" Ndle, BD Ultra-Fine Sushant Pen Needle 32 gauge x 5/32", Disp: , Rfl:     azithromycin (ZITHROMAX Z-KAM) 250 MG tablet, Take 2 po today then 1 a day for 4 days, Disp: 6 tablet, Rfl: 0    benzonatate (TESSALON) 100 MG capsule, benzonatate 100 mg capsule, Disp: , Rfl:     cyclobenzaprine (FLEXERIL) 10 MG tablet, cyclobenzaprine 10 mg tablet  TK 1 T PO TID PRF MUSCLE SPASMS, Disp: , Rfl:     diazePAM (VALIUM) 5 MG tablet, diazepam 5 mg tablet, Disp: , Rfl:     doxycycline (VIBRA-TABS) 100 MG tablet, doxycycline hyclate 100 mg tablet, Disp: , Rfl:     hyaluronate sod, cross-linked (GEL-ONE) 30 mg/3 mL Syrg, 3 mLs., Disp: , Rfl:     nystatin-triamcinolone (MYCOLOG II) cream, Apply to affected area 2 times daily, Disp: 30 g, Rfl: 1    sodium hyaluronate, orthovisc, (ORTHOVISC) 30 mg/2 mL Syrg, ORTHOVISC 30 mg/2 mL intra-articular syringe, Disp: , Rfl:     tizanidine (ZANAFLEX) 2 MG tablet, TK 1 T PO Q 8 H " "PRN, Disp: , Rfl: 0    Allergies  Review of patient's allergies indicates:   Allergen Reactions    Benadryl allergy decongestant     Codeine        Social History    Social History     Tobacco Use   Smoking Status Never Smoker   Smokeless Tobacco Never Used     ETOH - 1-2 drinks per week.  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   HENT: Negative for congestion.    Eyes: Negative for pain.   Respiratory: Negative for cough, hemoptysis, sputum production, shortness of breath, wheezing and stridor.    Cardiovascular: Negative for chest pain, palpitations, orthopnea, claudication, leg swelling and PND.   Gastrointestinal: Negative for abdominal pain, constipation, diarrhea, heartburn, nausea and vomiting.   Genitourinary: Negative for dysuria, frequency and urgency.   Musculoskeletal: Negative for falls and myalgias.   Neurological: Negative for sensory change, focal weakness and weakness.   Psychiatric/Behavioral: Negative for depression. The patient is not nervous/anxious.        Physical Exam    Vitals:    06/20/19 1056   BP: (!) 140/78   Pulse: 92   SpO2: (!) 93%   Weight: 122 kg (269 lb)   Height: 5' 2" (1.575 m)       Physical Exam   Constitutional: She is oriented to person, place, and time. She appears well-developed and well-nourished. No distress.   obese   HENT:   Head: Normocephalic and atraumatic.   Right Ear: External ear normal.   Left Ear: External ear normal.   Nose: Nose normal.   Mouth/Throat: Oropharynx is clear and moist.   Eyes: Pupils are equal, round, and reactive to light. Conjunctivae and EOM are normal.   Neck: Normal range " of motion. Neck supple. No JVD present. No tracheal deviation present. No thyromegaly present.   Cardiovascular: Normal rate, regular rhythm and intact distal pulses. Exam reveals no gallop and no friction rub.   Murmur heard.  2/6 REMIGIO   Pulmonary/Chest: Effort normal and breath sounds normal. No stridor. No respiratory distress. She has no wheezes. She has no rales. She exhibits no tenderness.   Abdominal: Soft. Bowel sounds are normal. She exhibits no distension. There is no tenderness.   Musculoskeletal: Normal range of motion. She exhibits no edema or tenderness.   Lymphadenopathy:     She has no cervical adenopathy.   Neurological: She is alert and oriented to person, place, and time. No cranial nerve deficit.   Skin: Skin is warm and dry. She is not diaphoretic.   Psychiatric: She has a normal mood and affect. Her behavior is normal.   Nursing note and vitals reviewed.      Lab      Xray      Impression/Plan  Problem List Items Addressed This Visit        Neuro    Obstructive sleep apnea syndrome  - stable with present therapy       Pulmonary    Chronic respiratory failure with hypoxia  - continue with O2 prn and qhs        Mild persistent asthma without complication  - doing well on present treatments  - no recent exacerbations  - RTC 12 months    DM  - she will discuss with her MD                   Fluids/Electrolytes/Nutrition/GI    Morbid obesity  - Discussed with pt at length about the need to work on diet and weight loss.  Have offered suggestions on approaches for this problem.  Also discussed the need to start a regular exercise program.  We discussed at length the importance of regular exercise and working at getting to a healthier weight.  All questions answered.  Pt voices understanding of these principles and hopefully will take action.       Other    Never smoker      Other Visit Diagnoses    None.       Percy Guzmán MD

## 2019-08-12 ENCOUNTER — HOSPITAL ENCOUNTER (OUTPATIENT)
Facility: HOSPITAL | Age: 59
Discharge: HOME | End: 2019-08-13
Attending: EMERGENCY MEDICINE | Admitting: HOSPITALIST
Payer: COMMERCIAL

## 2019-08-12 DIAGNOSIS — J96.20 ACUTE ON CHRONIC RESPIRATORY FAILURE: ICD-10-CM

## 2019-08-12 DIAGNOSIS — J44.1 BRONCHITIS, CHRONIC OBSTRUCTIVE, WITH EXACERBATION: ICD-10-CM

## 2019-08-12 DIAGNOSIS — R09.02 HYPOXIA: ICD-10-CM

## 2019-08-12 DIAGNOSIS — R06.02 SHORTNESS OF BREATH: Primary | ICD-10-CM

## 2019-08-12 LAB
ALBUMIN SERPL BCP-MCNC: 4.2 G/DL (ref 3.5–5.2)
ALP SERPL-CCNC: 88 U/L (ref 55–135)
ALT SERPL W/O P-5'-P-CCNC: 21 U/L (ref 10–44)
ANION GAP SERPL CALC-SCNC: 9 MMOL/L (ref 8–16)
AST SERPL-CCNC: 15 U/L (ref 10–40)
BASOPHILS # BLD AUTO: 0.03 K/UL (ref 0–0.2)
BASOPHILS NFR BLD: 0.4 % (ref 0–1.9)
BILIRUB SERPL-MCNC: 0.7 MG/DL (ref 0.1–1)
BNP SERPL-MCNC: 27 PG/ML (ref 0–99)
BUN SERPL-MCNC: 20 MG/DL (ref 6–20)
CALCIUM SERPL-MCNC: 10 MG/DL (ref 8.7–10.5)
CHLORIDE SERPL-SCNC: 102 MMOL/L (ref 95–110)
CK SERPL-CCNC: 76 U/L (ref 20–180)
CO2 SERPL-SCNC: 33 MMOL/L (ref 23–29)
CREAT SERPL-MCNC: 0.7 MG/DL (ref 0.5–1.4)
DIFFERENTIAL METHOD: ABNORMAL
EOSINOPHIL # BLD AUTO: 0.3 K/UL (ref 0–0.5)
EOSINOPHIL NFR BLD: 3.9 % (ref 0–8)
ERYTHROCYTE [DISTWIDTH] IN BLOOD BY AUTOMATED COUNT: 15.6 % (ref 11.5–14.5)
EST. GFR  (AFRICAN AMERICAN): >60 ML/MIN/1.73 M^2
EST. GFR  (NON AFRICAN AMERICAN): >60 ML/MIN/1.73 M^2
GLUCOSE SERPL-MCNC: 113 MG/DL (ref 70–110)
HCT VFR BLD AUTO: 41.9 % (ref 37–48.5)
HGB BLD-MCNC: 12.5 G/DL (ref 12–16)
IMM GRANULOCYTES # BLD AUTO: 0.03 K/UL (ref 0–0.04)
IMM GRANULOCYTES NFR BLD AUTO: 0.4 % (ref 0–0.5)
INR PPP: 1
LYMPHOCYTES # BLD AUTO: 1.4 K/UL (ref 1–4.8)
LYMPHOCYTES NFR BLD: 20.3 % (ref 18–48)
MCH RBC QN AUTO: 25.6 PG (ref 27–31)
MCHC RBC AUTO-ENTMCNC: 29.8 G/DL (ref 32–36)
MCV RBC AUTO: 86 FL (ref 82–98)
MONOCYTES # BLD AUTO: 0.3 K/UL (ref 0.3–1)
MONOCYTES NFR BLD: 4.7 % (ref 4–15)
NEUTROPHILS # BLD AUTO: 4.9 K/UL (ref 1.8–7.7)
NEUTROPHILS NFR BLD: 70.3 % (ref 38–73)
NRBC BLD-RTO: 0 /100 WBC
PLATELET # BLD AUTO: 228 K/UL (ref 150–350)
PMV BLD AUTO: 9.7 FL (ref 9.2–12.9)
POTASSIUM SERPL-SCNC: 4.2 MMOL/L (ref 3.5–5.1)
PROT SERPL-MCNC: 8.2 G/DL (ref 6–8.4)
PROTHROMBIN TIME: 13 SEC (ref 11.7–14)
RBC # BLD AUTO: 4.89 M/UL (ref 4–5.4)
SODIUM SERPL-SCNC: 144 MMOL/L (ref 136–145)
TROPONIN I SERPL DL<=0.01 NG/ML-MCNC: <0.03 NG/ML (ref 0.02–0.04)
WBC # BLD AUTO: 6.96 K/UL (ref 3.9–12.7)

## 2019-08-12 PROCEDURE — 63600175 PHARM REV CODE 636 W HCPCS: Performed by: EMERGENCY MEDICINE

## 2019-08-12 PROCEDURE — 83880 ASSAY OF NATRIURETIC PEPTIDE: CPT

## 2019-08-12 PROCEDURE — 96374 THER/PROPH/DIAG INJ IV PUSH: CPT

## 2019-08-12 PROCEDURE — 85610 PROTHROMBIN TIME: CPT

## 2019-08-12 PROCEDURE — 99285 EMERGENCY DEPT VISIT HI MDM: CPT

## 2019-08-12 PROCEDURE — 93005 ELECTROCARDIOGRAM TRACING: CPT

## 2019-08-12 PROCEDURE — 36415 COLL VENOUS BLD VENIPUNCTURE: CPT

## 2019-08-12 PROCEDURE — 96375 TX/PRO/DX INJ NEW DRUG ADDON: CPT

## 2019-08-12 PROCEDURE — 80053 COMPREHEN METABOLIC PANEL: CPT

## 2019-08-12 PROCEDURE — 85025 COMPLETE CBC W/AUTO DIFF WBC: CPT

## 2019-08-12 PROCEDURE — 82550 ASSAY OF CK (CPK): CPT

## 2019-08-12 PROCEDURE — 25000242 PHARM REV CODE 250 ALT 637 W/ HCPCS: Performed by: EMERGENCY MEDICINE

## 2019-08-12 PROCEDURE — 84484 ASSAY OF TROPONIN QUANT: CPT

## 2019-08-12 PROCEDURE — 96376 TX/PRO/DX INJ SAME DRUG ADON: CPT

## 2019-08-12 PROCEDURE — 25500020 PHARM REV CODE 255: Performed by: EMERGENCY MEDICINE

## 2019-08-12 RX ORDER — IPRATROPIUM BROMIDE 0.5 MG/2.5ML
0.5 SOLUTION RESPIRATORY (INHALATION) ONCE
Status: COMPLETED | OUTPATIENT
Start: 2019-08-12 | End: 2019-08-13

## 2019-08-12 RX ORDER — LEVALBUTEROL 1.25 MG/.5ML
1.25 SOLUTION, CONCENTRATE RESPIRATORY (INHALATION)
Status: COMPLETED | OUTPATIENT
Start: 2019-08-12 | End: 2019-08-13

## 2019-08-12 RX ORDER — METHYLPREDNISOLONE SOD SUCC 125 MG
125 VIAL (EA) INJECTION
Status: COMPLETED | OUTPATIENT
Start: 2019-08-12 | End: 2019-08-12

## 2019-08-12 RX ADMIN — METHYLPREDNISOLONE SODIUM SUCCINATE 125 MG: 125 INJECTION, POWDER, FOR SOLUTION INTRAMUSCULAR; INTRAVENOUS at 11:08

## 2019-08-12 RX ADMIN — IOHEXOL 100 ML: 350 INJECTION, SOLUTION INTRAVENOUS at 11:08

## 2019-08-12 RX ADMIN — LEVALBUTEROL HYDROCHLORIDE 1.25 MG: 1.25 SOLUTION, CONCENTRATE RESPIRATORY (INHALATION) at 11:08

## 2019-08-12 NOTE — ED PROVIDER NOTES
Encounter Date: 2019       History     Chief Complaint   Patient presents with    Shortness of Breath     for over 1 week    Cough     Patient here with reported shortness of breath, cough worsening over the course of 1 week patient has a history of chronic bronchitis and DrGautam sleep apnea she wears CPAP at night with 2 L per CPAP she does not wear oxygen during the day she states that she has been falling asleep becoming acutely hypoxic and dyspneic she takes oxygen saturation at home and it was 80% arrival in emergency department patient's initial oxygen saturation were in the 90s however after ambulating to the bathroom patient desatted to 81% she was very tachypneic but improved rapidly with her oxygen replacement to an 2.5 L per nasal cannula.  Patient reports cough which is minimally productive she denies any fever chills denies any significant chest pain no lower extremity edema she does wear compression hose         Review of patient's allergies indicates:   Allergen Reactions    Benadryl allergy decongestant     Codeine      Past Medical History:   Diagnosis Date    Arthritis     Asthma     Chronic bronchitis     Diabetes mellitus     Hyperlipidemia     Hypertension     Pain in joint of left shoulder region 2018    Sleep apnea     Thyroid disease      Past Surgical History:   Procedure Laterality Date    APPENDECTOMY       SECTION      x3    HYSTERECTOMY      OOPHORECTOMY Bilateral     age 42     Family History   Problem Relation Age of Onset    Pancreatic cancer Mother     Breast cancer Neg Hx     Colon cancer Neg Hx     Ovarian cancer Neg Hx      Social History     Tobacco Use    Smoking status: Never Smoker    Smokeless tobacco: Never Used   Substance Use Topics    Alcohol use: Yes     Comment: occasional    Drug use: No     Review of Systems   Constitutional: Positive for fatigue. Negative for chills and fever.   HENT: Negative for congestion, rhinorrhea and  sneezing.    Respiratory: Positive for cough and shortness of breath. Negative for chest tightness.    Cardiovascular: Negative for chest pain and leg swelling.        Positive proximal nocturnal dyspnea   Gastrointestinal: Negative for abdominal distention and nausea.   Genitourinary: Negative.    Musculoskeletal: Negative.    Skin: Negative.    Neurological: Negative.        Physical Exam     Initial Vitals [08/12/19 1835]   BP Pulse Resp Temp SpO2   (!) 142/68 91 16 97.7 °F (36.5 °C) (!) 93 %      MAP       --         Physical Exam    Constitutional: She appears well-developed and well-nourished. She is not diaphoretic. She appears distressed.   HENT:   Head: Normocephalic and atraumatic.   Right Ear: Tympanic membrane and ear canal normal.   Left Ear: Tympanic membrane and ear canal normal.   Mouth/Throat: Uvula is midline, oropharynx is clear and moist and mucous membranes are normal.   Neck: Normal range of motion. Neck supple. No JVD present.   Cardiovascular: Normal rate, regular rhythm, normal heart sounds and intact distal pulses.   No murmur heard.  Pulmonary/Chest: No stridor. She is in respiratory distress. She has wheezes. She has no rhonchi. She has no rales. She exhibits no tenderness.   Abdominal: Soft. Bowel sounds are normal. She exhibits no distension. There is no tenderness.   Musculoskeletal: Normal range of motion. She exhibits no edema.   Neurological: She is alert and oriented to person, place, and time. She has normal strength. GCS score is 15. GCS eye subscore is 4. GCS verbal subscore is 5. GCS motor subscore is 6.   Skin: Skin is warm and dry. Capillary refill takes less than 2 seconds.   Psychiatric: She has a normal mood and affect. Her behavior is normal. Thought content normal.         ED Course   Procedures  Labs Reviewed   CK   CK-MB   CBC W/ AUTO DIFFERENTIAL   COMPREHENSIVE METABOLIC PANEL   TROPONIN I   B-TYPE NATRIURETIC PEPTIDE   PROTIME-INR        ECG Results          EKG  12-lead (In process)  Result time 08/12/19 18:53:17    In process by Interface, Lab In [facility] (08/12/19 18:53:17)                 Narrative:    Test Reason : R06.02,    Vent. Rate : 072 BPM     Atrial Rate : 072 BPM     P-R Int : 152 ms          QRS Dur : 094 ms      QT Int : 372 ms       P-R-T Axes : 042 -50 065 degrees     QTc Int : 407 ms    Normal sinus rhythm with sinus arrhythmia  Left anterior fascicular block  Abnormal ECG  When compared with ECG of 10-FEB-2003 12:04,  No significant change was found    Referred By:             Confirmed By:                             Imaging Results    None          Medical Decision Making:   Differential Diagnosis:   Patient became markedly tachypneic with ambulation to the bathroom in the emergency department patient's oxygen saturation dropped to 80% on room air she was placed on to have L and oxygen saturation improved to 90 to 95% patient given nebulized therapy [facility] emergency department CTA obtained results pending at this time I have spoken with Dr. godinez to evaluate patient in the emergency department  Clinical Tests:   Lab Tests: Reviewed  Medical Tests: Reviewed                   ED Course as of Aug 12 2342   Mon Aug 12, 2019   1856 58-year-old female presents to the emergency department with complaint of nonproductive cough with shortness of breath that started approximately 1 week ago.    [MP]      ED Course User Index  [MP] MELVA Barrett     Clinical Impression:       ICD-10-CM ICD-9-CM   1. Shortness of breath R06.02 786.05   2. Bronchitis, chronic obstructive, with exacerbation J44.1 491.21   3. Hypoxia R09.02 799.02                                Dann Amaya MD  08/12/19 2052

## 2019-08-13 ENCOUNTER — CLINICAL SUPPORT (OUTPATIENT)
Dept: CARDIOLOGY | Facility: HOSPITAL | Age: 59
End: 2019-08-13
Attending: FAMILY MEDICINE
Payer: COMMERCIAL

## 2019-08-13 VITALS
HEART RATE: 92 BPM | HEIGHT: 62 IN | BODY MASS INDEX: 49.5 KG/M2 | RESPIRATION RATE: 18 BRPM | SYSTOLIC BLOOD PRESSURE: 135 MMHG | TEMPERATURE: 99 F | WEIGHT: 269 LBS | DIASTOLIC BLOOD PRESSURE: 71 MMHG | OXYGEN SATURATION: 95 %

## 2019-08-13 VITALS — WEIGHT: 269 LBS | BODY MASS INDEX: 49.5 KG/M2 | HEIGHT: 62 IN

## 2019-08-13 PROBLEM — E11.9 TYPE 2 DIABETES MELLITUS, WITH LONG-TERM CURRENT USE OF INSULIN: Status: ACTIVE | Noted: 2019-08-13

## 2019-08-13 PROBLEM — Z79.4 TYPE 2 DIABETES MELLITUS, WITH LONG-TERM CURRENT USE OF INSULIN: Status: ACTIVE | Noted: 2019-08-13

## 2019-08-13 PROBLEM — J96.20 ACUTE ON CHRONIC RESPIRATORY FAILURE: Status: RESOLVED | Noted: 2019-08-13 | Resolved: 2019-08-13

## 2019-08-13 PROBLEM — J96.20 ACUTE ON CHRONIC RESPIRATORY FAILURE: Status: ACTIVE | Noted: 2019-08-13

## 2019-08-13 PROBLEM — R06.00 DYSPNEA: Status: ACTIVE | Noted: 2019-08-13

## 2019-08-13 LAB
ALLENS TEST: ABNORMAL
ANION GAP SERPL CALC-SCNC: 11 MMOL/L (ref 8–16)
AORTIC ROOT ANNULUS: 2.87 CM
AORTIC VALVE CUSP SEPERATION: 2.11 CM
AV INDEX (PROSTH): 0.74
AV MEAN GRADIENT: 9 MMHG
AV PEAK GRADIENT: 17 MMHG
AV VALVE AREA: 2.06 CM2
AV VELOCITY RATIO: 64
BASOPHILS # BLD AUTO: 0.03 K/UL (ref 0–0.2)
BASOPHILS NFR BLD: 0.3 % (ref 0–1.9)
BSA FOR ECHO PROCEDURE: 2.31 M2
BUN SERPL-MCNC: 20 MG/DL (ref 6–20)
CALCIUM SERPL-MCNC: 9.1 MG/DL (ref 8.7–10.5)
CHLORIDE SERPL-SCNC: 100 MMOL/L (ref 95–110)
CO2 SERPL-SCNC: 28 MMOL/L (ref 23–29)
CREAT SERPL-MCNC: 1 MG/DL (ref 0.5–1.4)
CV ECHO LV RWT: 0.47 CM
DELSYS: ABNORMAL
DIFFERENTIAL METHOD: ABNORMAL
DOP CALC AO PEAK VEL: 2.07 M/S
DOP CALC AO VTI: 37.68 CM
DOP CALC LVOT AREA: 2.8 CM2
DOP CALC LVOT DIAMETER: 1.88 CM
DOP CALC LVOT PEAK VEL: 132.49 M/S
DOP CALC LVOT STROKE VOLUME: 77.66 CM3
DOP CALCLVOT PEAK VEL VTI: 27.99 CM
E WAVE DECELERATION TIME: 171.01 MSEC
E/A RATIO: 0.95
E/E' RATIO: 9.33 M/S
ECHO LV POSTERIOR WALL: 1.34 CM (ref 0.6–1.1)
EOSINOPHIL # BLD AUTO: 0 K/UL (ref 0–0.5)
EOSINOPHIL NFR BLD: 0.2 % (ref 0–8)
ERYTHROCYTE [DISTWIDTH] IN BLOOD BY AUTOMATED COUNT: 15.6 % (ref 11.5–14.5)
EST. GFR  (AFRICAN AMERICAN): >60 ML/MIN/1.73 M^2
EST. GFR  (NON AFRICAN AMERICAN): >60 ML/MIN/1.73 M^2
FLOW: 3
FRACTIONAL SHORTENING: 32 % (ref 28–44)
GLUCOSE SERPL-MCNC: 243 MG/DL (ref 70–110)
GLUCOSE SERPL-MCNC: 361 MG/DL (ref 70–110)
GLUCOSE SERPL-MCNC: 407 MG/DL (ref 70–110)
GLUCOSE SERPL-MCNC: 433 MG/DL (ref 70–110)
GLUCOSE SERPL-MCNC: 450 MG/DL (ref 70–110)
GLUCOSE SERPL-MCNC: 455 MG/DL (ref 70–110)
HCO3 UR-SCNC: 27 MMOL/L (ref 24–28)
HCT VFR BLD AUTO: 38.1 % (ref 37–48.5)
HGB BLD-MCNC: 11.8 G/DL (ref 12–16)
IMM GRANULOCYTES # BLD AUTO: 0.08 K/UL (ref 0–0.04)
IMM GRANULOCYTES NFR BLD AUTO: 0.9 % (ref 0–0.5)
INTERVENTRICULAR SEPTUM: 1.27 CM (ref 0.6–1.1)
IVRT: 50.1 MSEC
LEFT ATRIUM SIZE: 4.77 CM
LEFT INTERNAL DIMENSION IN SYSTOLE: 3.87 CM (ref 2.1–4)
LEFT VENTRICLE DIASTOLIC VOLUME INDEX: 91.42 ML/M2
LEFT VENTRICLE DIASTOLIC VOLUME: 198.2 ML
LEFT VENTRICLE MASS INDEX: 149 G/M2
LEFT VENTRICLE SYSTOLIC VOLUME INDEX: 39.2 ML/M2
LEFT VENTRICLE SYSTOLIC VOLUME: 84.9 ML
LEFT VENTRICULAR INTERNAL DIMENSION IN DIASTOLE: 5.68 CM (ref 3.5–6)
LEFT VENTRICULAR MASS: 322.15 G
LV LATERAL E/E' RATIO: 11.45 M/S
LV SEPTAL E/E' RATIO: 7.88 M/S
LYMPHOCYTES # BLD AUTO: 0.6 K/UL (ref 1–4.8)
LYMPHOCYTES NFR BLD: 6.5 % (ref 18–48)
MCH RBC QN AUTO: 26.5 PG (ref 27–31)
MCHC RBC AUTO-ENTMCNC: 31 G/DL (ref 32–36)
MCV RBC AUTO: 85 FL (ref 82–98)
MODE: ABNORMAL
MONOCYTES # BLD AUTO: 0.1 K/UL (ref 0.3–1)
MONOCYTES NFR BLD: 0.7 % (ref 4–15)
MV PEAK A VEL: 1.33 M/S
MV PEAK E VEL: 1.26 M/S
NEUTROPHILS # BLD AUTO: 7.8 K/UL (ref 1.8–7.7)
NEUTROPHILS NFR BLD: 91.4 % (ref 38–73)
NRBC BLD-RTO: 0 /100 WBC
PCO2 BLDA: 49.1 MMHG (ref 35–45)
PH SMN: 7.35 [PH] (ref 7.35–7.45)
PLATELET # BLD AUTO: 230 K/UL (ref 150–350)
PMV BLD AUTO: 9.9 FL (ref 9.2–12.9)
PO2 BLDA: 107 MMHG (ref 80–100)
POC BE: 1 MMOL/L
POC SATURATED O2: 98 % (ref 95–100)
POC TCO2: 28 MMOL/L (ref 23–27)
POTASSIUM SERPL-SCNC: 4.4 MMOL/L (ref 3.5–5.1)
RBC # BLD AUTO: 4.46 M/UL (ref 4–5.4)
RIGHT VENTRICULAR END-DIASTOLIC DIMENSION: 151 CM
SAMPLE: ABNORMAL
SITE: ABNORMAL
SODIUM SERPL-SCNC: 139 MMOL/L (ref 136–145)
TDI LATERAL: 0.11 M/S
TDI SEPTAL: 0.16 M/S
TDI: 0.14 M/S
TROPONIN I SERPL DL<=0.01 NG/ML-MCNC: <0.03 NG/ML (ref 0.02–0.04)
TROPONIN I SERPL DL<=0.01 NG/ML-MCNC: <0.03 NG/ML (ref 0.02–0.04)
TSH SERPL DL<=0.005 MIU/L-ACNC: 1.05 UIU/ML (ref 0.34–5.6)
WBC # BLD AUTO: 8.59 K/UL (ref 3.9–12.7)

## 2019-08-13 PROCEDURE — S5571 INSULIN DISPOS PEN 3 ML: HCPCS | Performed by: FAMILY MEDICINE

## 2019-08-13 PROCEDURE — 93306 TTE W/DOPPLER COMPLETE: CPT

## 2019-08-13 PROCEDURE — 94645 CONT INHLJ TX EACH ADDL HOUR: CPT

## 2019-08-13 PROCEDURE — 84443 ASSAY THYROID STIM HORMONE: CPT

## 2019-08-13 PROCEDURE — G0378 HOSPITAL OBSERVATION PER HR: HCPCS

## 2019-08-13 PROCEDURE — 27000221 HC OXYGEN, UP TO 24 HOURS

## 2019-08-13 PROCEDURE — 96376 TX/PRO/DX INJ SAME DRUG ADON: CPT

## 2019-08-13 PROCEDURE — 96372 THER/PROPH/DIAG INJ SC/IM: CPT | Mod: 59

## 2019-08-13 PROCEDURE — 96374 THER/PROPH/DIAG INJ IV PUSH: CPT

## 2019-08-13 PROCEDURE — 25000003 PHARM REV CODE 250: Performed by: HOSPITALIST

## 2019-08-13 PROCEDURE — 25000242 PHARM REV CODE 250 ALT 637 W/ HCPCS: Performed by: HOSPITALIST

## 2019-08-13 PROCEDURE — 94660 CPAP INITIATION&MGMT: CPT

## 2019-08-13 PROCEDURE — 99900035 HC TECH TIME PER 15 MIN (STAT)

## 2019-08-13 PROCEDURE — 94640 AIRWAY INHALATION TREATMENT: CPT

## 2019-08-13 PROCEDURE — 94761 N-INVAS EAR/PLS OXIMETRY MLT: CPT

## 2019-08-13 PROCEDURE — 96375 TX/PRO/DX INJ NEW DRUG ADDON: CPT | Mod: 59

## 2019-08-13 PROCEDURE — 63600175 PHARM REV CODE 636 W HCPCS: Performed by: FAMILY MEDICINE

## 2019-08-13 PROCEDURE — 85025 COMPLETE CBC W/AUTO DIFF WBC: CPT

## 2019-08-13 PROCEDURE — 80048 BASIC METABOLIC PNL TOTAL CA: CPT

## 2019-08-13 PROCEDURE — 94644 CONT INHLJ TX 1ST HOUR: CPT

## 2019-08-13 PROCEDURE — 94760 N-INVAS EAR/PLS OXIMETRY 1: CPT | Mod: 59

## 2019-08-13 PROCEDURE — 25000242 PHARM REV CODE 250 ALT 637 W/ HCPCS: Performed by: EMERGENCY MEDICINE

## 2019-08-13 PROCEDURE — 63600175 PHARM REV CODE 636 W HCPCS: Performed by: HOSPITALIST

## 2019-08-13 PROCEDURE — 36600 WITHDRAWAL OF ARTERIAL BLOOD: CPT

## 2019-08-13 PROCEDURE — 84484 ASSAY OF TROPONIN QUANT: CPT

## 2019-08-13 RX ORDER — GABAPENTIN 300 MG/1
300 CAPSULE ORAL NIGHTLY
Status: DISCONTINUED | OUTPATIENT
Start: 2019-08-13 | End: 2019-08-13

## 2019-08-13 RX ORDER — INSULIN ASPART 100 [IU]/ML
1-10 INJECTION, SOLUTION INTRAVENOUS; SUBCUTANEOUS
Status: DISCONTINUED | OUTPATIENT
Start: 2019-08-13 | End: 2019-08-14 | Stop reason: HOSPADM

## 2019-08-13 RX ORDER — FLUTICASONE PROPIONATE AND SALMETEROL 250; 50 UG/1; UG/1
1 POWDER RESPIRATORY (INHALATION) 2 TIMES DAILY
Status: DISCONTINUED | OUTPATIENT
Start: 2019-08-13 | End: 2019-08-13

## 2019-08-13 RX ORDER — IBUPROFEN 200 MG
24 TABLET ORAL
Status: DISCONTINUED | OUTPATIENT
Start: 2019-08-13 | End: 2019-08-14 | Stop reason: HOSPADM

## 2019-08-13 RX ORDER — GLUCAGON 1 MG
1 KIT INJECTION
Status: DISCONTINUED | OUTPATIENT
Start: 2019-08-13 | End: 2019-08-14 | Stop reason: HOSPADM

## 2019-08-13 RX ORDER — SODIUM CHLORIDE 0.9 % (FLUSH) 0.9 %
10 SYRINGE (ML) INJECTION
Status: DISCONTINUED | OUTPATIENT
Start: 2019-08-13 | End: 2019-08-14 | Stop reason: HOSPADM

## 2019-08-13 RX ORDER — POTASSIUM CHLORIDE 20 MEQ/15ML
40 SOLUTION ORAL
Status: DISCONTINUED | OUTPATIENT
Start: 2019-08-13 | End: 2019-08-14 | Stop reason: HOSPADM

## 2019-08-13 RX ORDER — ENOXAPARIN SODIUM 100 MG/ML
40 INJECTION SUBCUTANEOUS EVERY 24 HOURS
Status: DISCONTINUED | OUTPATIENT
Start: 2019-08-13 | End: 2019-08-13

## 2019-08-13 RX ORDER — IPRATROPIUM BROMIDE AND ALBUTEROL SULFATE 2.5; .5 MG/3ML; MG/3ML
3 SOLUTION RESPIRATORY (INHALATION) EVERY 4 HOURS PRN
Status: DISCONTINUED | OUTPATIENT
Start: 2019-08-13 | End: 2019-08-14 | Stop reason: HOSPADM

## 2019-08-13 RX ORDER — LEVOFLOXACIN 750 MG/1
750 TABLET ORAL DAILY
Status: DISCONTINUED | OUTPATIENT
Start: 2019-08-13 | End: 2019-08-14 | Stop reason: HOSPADM

## 2019-08-13 RX ORDER — GABAPENTIN 300 MG/1
600 CAPSULE ORAL NIGHTLY
Status: DISCONTINUED | OUTPATIENT
Start: 2019-08-13 | End: 2019-08-14 | Stop reason: HOSPADM

## 2019-08-13 RX ORDER — LANOLIN ALCOHOL/MO/W.PET/CERES
800 CREAM (GRAM) TOPICAL
Status: DISCONTINUED | OUTPATIENT
Start: 2019-08-13 | End: 2019-08-14 | Stop reason: HOSPADM

## 2019-08-13 RX ORDER — GLIMEPIRIDE 4 MG/1
4 TABLET ORAL
Status: DISCONTINUED | OUTPATIENT
Start: 2019-08-13 | End: 2019-08-14 | Stop reason: HOSPADM

## 2019-08-13 RX ORDER — LEVOTHYROXINE SODIUM 25 UG/1
25 TABLET ORAL
Status: DISCONTINUED | OUTPATIENT
Start: 2019-08-13 | End: 2019-08-14 | Stop reason: HOSPADM

## 2019-08-13 RX ORDER — ONDANSETRON 2 MG/ML
4 INJECTION INTRAMUSCULAR; INTRAVENOUS EVERY 6 HOURS PRN
Status: DISCONTINUED | OUTPATIENT
Start: 2019-08-13 | End: 2019-08-14 | Stop reason: HOSPADM

## 2019-08-13 RX ORDER — ATORVASTATIN CALCIUM 20 MG/1
20 TABLET, FILM COATED ORAL DAILY
Status: DISCONTINUED | OUTPATIENT
Start: 2019-08-13 | End: 2019-08-14 | Stop reason: HOSPADM

## 2019-08-13 RX ORDER — INSULIN ASPART 100 [IU]/ML
15 INJECTION, SOLUTION INTRAVENOUS; SUBCUTANEOUS ONCE
Status: COMPLETED | OUTPATIENT
Start: 2019-08-13 | End: 2019-08-13

## 2019-08-13 RX ORDER — IBUPROFEN 200 MG
16 TABLET ORAL
Status: DISCONTINUED | OUTPATIENT
Start: 2019-08-13 | End: 2019-08-14 | Stop reason: HOSPADM

## 2019-08-13 RX ORDER — ASPIRIN 81 MG/1
81 TABLET ORAL DAILY
Status: DISCONTINUED | OUTPATIENT
Start: 2019-08-13 | End: 2019-08-14 | Stop reason: HOSPADM

## 2019-08-13 RX ORDER — ACETAMINOPHEN 325 MG/1
650 TABLET ORAL EVERY 4 HOURS PRN
Status: DISCONTINUED | OUTPATIENT
Start: 2019-08-13 | End: 2019-08-14 | Stop reason: HOSPADM

## 2019-08-13 RX ORDER — FLUTICASONE FUROATE AND VILANTEROL 100; 25 UG/1; UG/1
1 POWDER RESPIRATORY (INHALATION) DAILY
Status: DISCONTINUED | OUTPATIENT
Start: 2019-08-13 | End: 2019-08-14 | Stop reason: HOSPADM

## 2019-08-13 RX ORDER — AMLODIPINE BESYLATE 2.5 MG/1
2.5 TABLET ORAL DAILY
Status: DISCONTINUED | OUTPATIENT
Start: 2019-08-13 | End: 2019-08-14 | Stop reason: HOSPADM

## 2019-08-13 RX ORDER — ENOXAPARIN SODIUM 100 MG/ML
40 INJECTION SUBCUTANEOUS EVERY 12 HOURS
Status: DISCONTINUED | OUTPATIENT
Start: 2019-08-13 | End: 2019-08-14 | Stop reason: HOSPADM

## 2019-08-13 RX ORDER — GABAPENTIN 300 MG/1
300 CAPSULE ORAL NIGHTLY
Status: COMPLETED | OUTPATIENT
Start: 2019-08-13 | End: 2019-08-13

## 2019-08-13 RX ORDER — ACETAMINOPHEN 325 MG/1
650 TABLET ORAL EVERY 8 HOURS PRN
Status: DISCONTINUED | OUTPATIENT
Start: 2019-08-13 | End: 2019-08-14 | Stop reason: HOSPADM

## 2019-08-13 RX ADMIN — ATORVASTATIN CALCIUM 20 MG: 20 TABLET, FILM COATED ORAL at 09:08

## 2019-08-13 RX ADMIN — METHYLPREDNISOLONE SODIUM SUCCINATE 80 MG: 40 INJECTION, POWDER, FOR SOLUTION INTRAMUSCULAR; INTRAVENOUS at 05:08

## 2019-08-13 RX ADMIN — IPRATROPIUM BROMIDE 0.5 MG: 0.5 SOLUTION RESPIRATORY (INHALATION) at 12:08

## 2019-08-13 RX ADMIN — FLUTICASONE FUROATE AND VILANTEROL TRIFENATATE 1 PUFF: 100; 25 POWDER RESPIRATORY (INHALATION) at 07:08

## 2019-08-13 RX ADMIN — GABAPENTIN 300 MG: 300 CAPSULE ORAL at 02:08

## 2019-08-13 RX ADMIN — INSULIN ASPART 4 UNITS: 100 INJECTION, SOLUTION INTRAVENOUS; SUBCUTANEOUS at 03:08

## 2019-08-13 RX ADMIN — GABAPENTIN 300 MG: 300 CAPSULE ORAL at 01:08

## 2019-08-13 RX ADMIN — INSULIN ASPART 15 UNITS: 100 INJECTION, SOLUTION INTRAVENOUS; SUBCUTANEOUS at 04:08

## 2019-08-13 RX ADMIN — AMLODIPINE BESYLATE 2.5 MG: 2.5 TABLET ORAL at 09:08

## 2019-08-13 RX ADMIN — INSULIN DETEMIR 40 UNITS: 100 INJECTION, SOLUTION SUBCUTANEOUS at 12:08

## 2019-08-13 RX ADMIN — LEVOTHYROXINE SODIUM 25 MCG: 25 TABLET ORAL at 05:08

## 2019-08-13 RX ADMIN — METHYLPREDNISOLONE SODIUM SUCCINATE 80 MG: 40 INJECTION, POWDER, FOR SOLUTION INTRAMUSCULAR; INTRAVENOUS at 02:08

## 2019-08-13 RX ADMIN — LEVALBUTEROL HYDROCHLORIDE 1.25 MG: 1.25 SOLUTION, CONCENTRATE RESPIRATORY (INHALATION) at 12:08

## 2019-08-13 RX ADMIN — INSULIN ASPART 10 UNITS: 100 INJECTION, SOLUTION INTRAVENOUS; SUBCUTANEOUS at 08:08

## 2019-08-13 RX ADMIN — ASPIRIN 81 MG: 81 TABLET, COATED ORAL at 09:08

## 2019-08-13 RX ADMIN — GLIMEPIRIDE 4 MG: 4 TABLET ORAL at 08:08

## 2019-08-13 RX ADMIN — LEVOFLOXACIN 750 MG: 750 TABLET, FILM COATED ORAL at 08:08

## 2019-08-13 NOTE — HPI
Patient 58-year-old female who states she has been well in a usual state of health.  Patient was recently on a cruise and was at her baseline.  For the last 2 days she has had increasing shortness of breath.  Patient uses 2 L of home O2 and CPAP at night.  She states during the last 2 days she has had increasing shortness of breath worse with exertion, and has PND.  Patient states when she falls asleep she awakens suddenly like she does when she does not use her CPAP.  Patient denies fever chills chest pain.  There is no history of blood clots.

## 2019-08-13 NOTE — ASSESSMENT & PLAN NOTE
Acute on chronic respiratory failure. with  underlying hypoxia  Most  likely multifactorial.  Patient has a history of underlying chronic respiratory failure and uses 2 L home O2.  She also has a history of underlying sleep apnea and uses CPAP.  Patient is morbidly obese suspect underlying hypoventilation syndrome.  She routinely sees  - will consult Pulmonary  Will continue IV steroids and bronchodilators

## 2019-08-13 NOTE — PROGRESS NOTES
0230 to 0305.  Resp assessment and CPAP S/U. Pt wears home cpap. Pt thinks the pressure is 10 cmh2o. Set up Devilbliss on auto mode. Pt requested a nasal mask instead a full face mask.. Bled in 3lpm O2 to maintain Sats greater than 92%. Instructed Pt on PRN tx. Instructed Pt on Breo substitute for Advair. Pt verbalized understanding

## 2019-08-13 NOTE — H&P
[facility] Medicine  History & Physical    Patient Name: Liset Bailon  MRN: [id number]  Admission Date: 2019  Attending Physician:Hosea Luo   Primary Care Provider: Primary Doctor No         Patient information was obtained from patient and ER records.     Subjective:     Principal Problem:Acute on chronic respiratory failure    Chief Complaint:   Chief Complaint   Patient presents with    Shortness of Breath     for over 1 week    Cough        HPI: Patient 58-year-old female who states she has been well in a usual state of health.  Patient was recently on a cruise and was at her baseline.  For the last 2 days she has had increasing shortness of breath.  Patient uses 2 L of home O2 and CPAP at night.  She states during the last 2 days she has had increasing shortness of breath worse with exertion, and has PND.  Patient states when she falls asleep she awakens suddenly like she does when she does not use her CPAP.  Patient denies fever chills chest pain.  There is no history of blood clots.    Past Medical History:   Diagnosis Date    Arthritis     Asthma     Chronic bronchitis     Diabetes mellitus     Hyperlipidemia     Hypertension     Pain in joint of left shoulder region 2018    Sleep apnea     Thyroid disease        Past Surgical History:   Procedure Laterality Date    APPENDECTOMY       SECTION      x3    HYSTERECTOMY      OOPHORECTOMY Bilateral     age 42       Review of patient's allergies indicates:   Allergen Reactions    Benadryl allergy decongestant     Codeine        No current facility-administered medications on file prior to encounter.      Current Outpatient Medications on File Prior to Encounter   Medication Sig    amlodipine (NORVASC) 2.5 MG tablet TK 1 T PO BID    aspirin (ECOTRIN) 81 MG EC tablet Take 81 mg by mouth once daily.    atorvastatin (LIPITOR) 20 MG tablet TK 1 T PO QPM    BASAGLAR KWIKPEN U-100 INSULIN glargine 100  units/mL (3mL) SubQ pen INJECT SUBCUTANEOUS 35 UNITS EVERY MORNING    ergocalciferol (ERGOCALCIFEROL) 50,000 unit Cap ergocalciferol (vitamin D2) 50,000 unit capsule    fluticasone-salmeterol diskus inhaler 250-50 mcg INHALE ONE PUFF INTO THE LUNGS TWICE DAILY    gabapentin (NEURONTIN) 300 MG capsule TAKE ONE CAPSULE BY MOUTH IN THE MORNING, ONE CAPSULE AT NOON, AND 2 CAPSULES BEFORE BED    glimepiride (AMARYL) 4 MG tablet TK 1 T PO D    hydrochlorothiazide (HYDRODIURIL) 25 MG tablet TK 1 T PO D    insulin (BASAGLAR KWIKPEN U-100 INSULIN) glargine 100 units/mL (3mL) SubQ pen Basaglar KwikPen U-100 Insulin 100 unit/mL (3 mL) subcutaneous 35units    insulin aspart U-100 (NOVOLOG FLEXPEN U-100 INSULIN) 100 unit/mL InPn pen Novolog Flexpen U-100 Insulin aspart 100 unit/mL subcutaneous   INJECT 15 UNITS SUBCUTANEOUS THREE TIMES A DAY    irbesartan (AVAPRO) 300 MG tablet TK 1 T PO D    levalbuterol (XOPENEX) 0.63 mg/3 mL nebulizer solution USE 1 VIAL VIA NEBULIZER THREE TIMES DAILY AS NEEDED FOR WHEEZING    levothyroxine (SYNTHROID) 25 MCG tablet TK 1 T PO D    liraglutide 0.6 mg/0.1 mL, 18 mg/3 mL, subq PNIJ (VICTOZA 3-KAM) 0.6 mg/0.1 mL (18 mg/3 mL) PnIj Victoza 3-Kam 0.6 mg/0.1 mL (18 mg/3 mL) subcutaneous pen injector    metformin (GLUCOPHAGE) 500 MG tablet TK 1 T PO BID    omeprazole (PRILOSEC) 20 MG capsule TK 1 C PO D PRN    albuterol (PROAIR HFA) 90 mcg/actuation inhaler Inhale 2 puffs into the lungs every 6 (six) hours as needed for Wheezing. Rescue    azithromycin (ZITHROMAX Z-KAM) 250 MG tablet Take 2 po today then 1 a day for 4 days    BD INSULIN SYRINGE ULTRA-FINE 0.5 mL 31 gauge x 5/16 Syrg USE TO INJECT 4X DAILY    benzonatate (TESSALON) 100 MG capsule benzonatate 100 mg capsule    blood sugar diagnostic (FREESTYLE LITE STRIPS MISC) FreeStyle Lite Strips   USE UTD QID    cyclobenzaprine (FLEXERIL) 10 MG tablet cyclobenzaprine 10 mg tablet   TK 1 T PO TID PRF MUSCLE SPASMS    diazePAM  "(VALIUM) 5 MG tablet diazepam 5 mg tablet    doxycycline (VIBRA-TABS) 100 MG tablet doxycycline hyclate 100 mg tablet    FREESTYLE INSULINX TEST STRIPS Strp TEST BLOOD SUGAR QID    FREESTYLE LANCETS 28 gauge lancets TEST QID    hyaluronate sod, cross-linked (GEL-ONE) 30 mg/3 mL Syrg 3 mLs.    hylan g-f 20 (SYNVISC-ONE) 48 mg/6 mL Syrg 6 mLs.    insulin syringe-needle U-100 (BD INSULIN SYRINGE ULTRA-FINE) 0.5 mL 31 gauge x 5/16" Syrg BD Insulin Syringe Ultra-Fine 0.5 mL 31 gauge x 5/16"    lancets (FREESTYLE LANCETS) 28 gauge Misc FreeStyle Lancets 28 gauge    meloxicam (MOBIC) 15 MG tablet meloxicam 15 mg tablet    nystatin-triamcinolone (MYCOLOG II) cream Apply to affected area 2 times daily    pen needle, diabetic (BD ULTRA-FINE SUSHANT PEN NEEDLE) 32 gauge x 5/32" Ndle BD Ultra-Fine Sushant Pen Needle 32 gauge x 5/32"    sodium hyaluronate, orthovisc, (ORTHOVISC) 30 mg/2 mL Syrg ORTHOVISC 30 mg/2 mL intra-articular syringe    tizanidine (ZANAFLEX) 2 MG tablet TK 1 T PO Q 8 H PRN     Family History     Problem Relation (Age of Onset)    Pancreatic cancer Mother        Tobacco Use    Smoking status: Never Smoker    Smokeless tobacco: Never Used   Substance and Sexual Activity    Alcohol use: Yes     Comment: occasional    Drug use: No    Sexual activity: Never     Partners: Male     Birth control/protection: See Surgical Hx     Review of Systems   Constitutional: Positive for activity change. Negative for fatigue and fever.   HENT: Negative for congestion, sinus pressure and sore throat.    Respiratory: Positive for cough and shortness of breath. Negative for chest tightness.    Cardiovascular: Negative for chest pain and palpitations.   Gastrointestinal: Negative for abdominal pain, nausea and vomiting.   Genitourinary: Negative for difficulty urinating and dysuria.   Musculoskeletal: Negative for back pain and gait problem.   Neurological: Negative for syncope and weakness.   Psychiatric/Behavioral: " Negative for confusion. The patient is not nervous/anxious.      Objective:     Vital Signs (Most Recent):  Temp: 97.7 °F (36.5 °C) (08/12/19 1835)  Pulse: 75 (08/12/19 2301)  Resp: (!) 26 (08/12/19 2230)  BP: (!) 149/70 (08/12/19 2301)  SpO2: 95 % (08/12/19 2301) Vital Signs (24h Range):  Temp:  [97.7 °F (36.5 °C)] 97.7 °F (36.5 °C)  Pulse:  [75-91] 75  Resp:  [16-26] 26  SpO2:  [92 %-95 %] 95 %  BP: (132-149)/(65-71) 149/70     Weight: 122 kg (269 lb)  Body mass index is 49.2 kg/m².    Physical Exam   Constitutional: She is oriented to person, place, and time. She appears well-developed and well-nourished.   Patient appears older than stated age   HENT:   Head: Normocephalic and atraumatic.   Eyes: Conjunctivae are normal. No scleral icterus.   Neck: Normal range of motion. Neck supple.   Cardiovascular: Normal rate, regular rhythm and normal heart sounds.   Pulmonary/Chest:   Course breath sounds moderate effort occasional rhonchi and no wheeze   Abdominal: Soft. Bowel sounds are normal. There is no tenderness.   Obese   Musculoskeletal: Normal range of motion.   Right dorsal aspect of hand with ecchymosis, radial pulses 2+  Lower extremities with 1+ edema bilaterally. Chronic   Neurological: She is alert and oriented to person, place, and time.   Skin: Skin is warm. No rash noted.   Psychiatric: She has a normal mood and affect. Her behavior is normal.   Nursing note and vitals reviewed.          Significant Labs:   BMP:   Recent Labs   Lab 08/12/19  1848   *      K 4.2      CO2 33*   BUN 20   CREATININE 0.7   CALCIUM 10.0     CBC:   Recent Labs   Lab 08/12/19 1848   WBC 6.96   HGB 12.5   HCT 41.9        CMP:   Recent Labs   Lab 08/12/19 1848      K 4.2      CO2 33*   *   BUN 20   CREATININE 0.7   CALCIUM 10.0   PROT 8.2   ALBUMIN 4.2   BILITOT 0.7   ALKPHOS 88   AST 15   ALT 21   ANIONGAP 9   EGFRNONAA >60.0     Troponin:   Recent Labs   Lab 08/12/19  184    TROPONINI <0.030       Significant Imaging:  Chest x-ray reviewed personally by me no acute cardiopulmonary process  EKG read personally by me normal sinus rhythm no acute ST segment changes    Assessment/Plan:     * Acute on chronic respiratory failure  Acute on chronic respiratory failure. with  underlying hypoxia  Most  likely multifactorial.  Patient has a history of underlying chronic respiratory failure and uses 2 L home O2.  She also has a history of underlying sleep apnea and uses CPAP.  Patient is morbidly obese suspect underlying hypoventilation syndrome.  She routinely sees  - will consult Pulmonary  Will continue IV steroids and bronchodilators    Obstructive sleep apnea syndrome  Will resume patient's CPAP      Morbid obesity  Noted      Type 2 diabetes mellitus, with long-term current use of insulin  Will hold Glucophage.  Patient had CTA.  Will follow renal function  Use sliding scale      VTE Risk Mitigation (From admission, onward)    None        I reviewed CTA findings with radiology   No large vessel defects , smaller vessels not well visualized   Clinically I doubt PE         Hosea Luo, DO  Department of Hospital Medicine   [facility]

## 2019-08-13 NOTE — CARE UPDATE
08/13/19 0230   Patient Assessment/Suction   Level of Consciousness (AVPU) alert   Respiratory Effort Normal   Expansion/Accessory Muscles/Retractions no retractions;no use of accessory muscles   All Lung Fields Breath Sounds diminished   PRE-TX-O2   O2 Device (Oxygen Therapy) nasal cannula   $ Is the patient on Low Flow Oxygen? Yes   Flow (L/min) 2.5  (Pt wears home O2 at 2.5 lpm)   SpO2 (!) 94 %   Pulse Oximetry Type Intermittent   $ Pulse Oximetry - Single Charge Pulse Oximetry - Single   Pulse 84   Resp 20   Aerosol Therapy   $ Aerosol Therapy Charges PRN treatment not required   Respiratory Interventions   Cough And Deep Breathing done with encouragement   Breathing Techniques/Airway Clearance deep/controlled cough encouraged;diaphragmatic breathing promoted   Preset CPAP/BiPAP Settings   Mode Of Delivery CPAP   $ CPAP/BiPAP Daily Charge BiPAP/CPAP Daily   $ Initial CPAP/BiPAP Setup? Yes   $ Is patient using? Yes   Size of Mask Other (see comments)  (Nasal mask used)   Sized Appropriately? Yes   Equipment Type DeVilbiss   Airway Device Type medium nasal mask   Ipap 25   EPAP (cm H2O) 10   Pressure Support (cm H2O) 15   Pt will achieve and maintain optimal cardiopulmonary status. Pt has home CPAP. Pt not sure what pressure are, she thinks [id number]. Set Pt on devilbliss auto bipap 25/10.  0230 to 0305 fitting Pt with nasal mask. Bled in 3lpm O2 to maintain Sats >92%.

## 2019-08-13 NOTE — SUBJECTIVE & OBJECTIVE
Past Medical History:   Diagnosis Date    Acute on chronic respiratory failure 2019    Arthritis     Asthma     Chronic bronchitis     Diabetes mellitus     Hyperlipidemia     Hypertension     Pain in joint of left shoulder region 2018    Sleep apnea     Thyroid disease        Past Surgical History:   Procedure Laterality Date    APPENDECTOMY       SECTION      x3    HYSTERECTOMY      OOPHORECTOMY Bilateral     age 42       Review of patient's allergies indicates:   Allergen Reactions    Benadryl allergy decongestant     Codeine        Family History     Problem Relation (Age of Onset)    Pancreatic cancer Mother        Tobacco Use    Smoking status: Never Smoker    Smokeless tobacco: Never Used   Substance and Sexual Activity    Alcohol use: Yes     Comment: occasional    Drug use: No    Sexual activity: Never     Partners: Male     Birth control/protection: See Surgical Hx         Review of Systems   Constitutional: Positive for activity change and fatigue.   HENT: Negative for congestion and sore throat.    Eyes: Negative for pain and redness.   Respiratory: Positive for shortness of breath. Negative for wheezing.    Cardiovascular: Negative for chest pain and palpitations.   Gastrointestinal: Negative for abdominal distention and abdominal pain.   Endocrine: Negative for cold intolerance and heat intolerance.   Genitourinary: Negative for difficulty urinating and frequency.   Musculoskeletal: Positive for arthralgias and back pain.   Allergic/Immunologic: Negative for environmental allergies and immunocompromised state.   Neurological: Negative for dizziness and numbness.   Hematological: Negative for adenopathy. Does not bruise/bleed easily.   Psychiatric/Behavioral: Negative for confusion. The patient is not nervous/anxious.      Objective:     Vital Signs (Most Recent):  Temp: 98.8 °F (37.1 °C) (19 1600)  Pulse: 92 (19 1600)  Resp: 18 (19 1600)  BP:  135/71 (08/13/19 1600)  SpO2: 95 % (08/13/19 1600) Vital Signs (24h Range):  Temp:  [97.8 °F (36.6 °C)-98.8 °F (37.1 °C)] 98.8 °F (37.1 °C)  Pulse:  [] 92  Resp:  [17-26] 18  SpO2:  [91 %-96 %] 95 %  BP: (126-160)/(60-78) 135/71     Weight: 122 kg (269 lb)  Body mass index is 49.2 kg/m².    No intake or output data in the 24 hours ending 08/13/19 1836    Physical Exam   Constitutional: She is oriented to person, place, and time. She appears well-developed and well-nourished.   Morbidly obese   HENT:   Head: Normocephalic and atraumatic.   Eyes: Pupils are equal, round, and reactive to light. EOM are normal.   Neck: Normal range of motion. Neck supple.   Cardiovascular: Normal rate and regular rhythm.   Pulmonary/Chest: Effort normal and breath sounds normal.   Abdominal: Soft. Bowel sounds are normal.   Obese   Musculoskeletal: Normal range of motion.   Neurological: She is alert and oriented to person, place, and time.   Skin: Skin is warm and dry.   Psychiatric: Her behavior is normal. Thought content normal.   Nursing note and vitals reviewed.             Lines/Drains/Airways          None          Significant Labs:    CBC/Anemia Profile:  Recent Labs   Lab 08/12/19 1848 08/13/19  0613   WBC 6.96 8.59   HGB 12.5 11.8*   HCT 41.9 38.1    230   MCV 86 85   RDW 15.6* 15.6*        Chemistries:  Recent Labs   Lab 08/12/19 1848 08/13/19  0613    139   K 4.2 4.4    100   CO2 33* 28   BUN 20 20   CREATININE 0.7 1.0   CALCIUM 10.0 9.1   ALBUMIN 4.2  --    PROT 8.2  --    BILITOT 0.7  --    ALKPHOS 88  --    ALT 21  --    AST 15  --        ABGs:   Recent Labs   Lab 08/13/19  1356   PH 7.348*   PCO2 49.1*   HCO3 27.0   POCSATURATED 98   BE 1       Significant Imaging:   CXR: I have reviewed all pertinent results/findings within the past 24 hours and my personal findings are:  Clear lungs     CT shows diffuse increased interstitial markings.

## 2019-08-13 NOTE — PLAN OF CARE
08/13/19 1153   Discharge Assessment   Assessment Type Discharge Planning Assessment   Confirmed/corrected address and phone number on facesheet? Yes   Assessment information obtained from? Patient   Prior to hospitilization cognitive status: Alert/Oriented   Prior to hospitalization functional status: Independent   Current cognitive status: Alert/Oriented   Current Functional Status: Independent   Facility Arrived From: HOME   Lives With child(nancy), adult;spouse   Able to Return to Prior Arrangements yes   Is patient able to care for self after discharge? Yes   Who are your caregiver(s) and their phone number(s)? DAUGHTER - STEVIE HOPPER - (c) [phone]   Patient's perception of discharge disposition home or selfcare   Readmission Within the Last 30 Days no previous admission in last 30 days   Patient currently being followed by outpatient case management? No   Patient currently receives any other outside agency services? No   Equipment Currently Used at Home oxygen;CPAP   Do you have any problems affording any of your prescribed medications? No   Is the patient taking medications as prescribed? yes   Does the patient have transportation home? Yes   Transportation Anticipated family or friend will provide   Dialysis Name and Scheduled days N/A   Does the patient receive services at the Coumadin Clinic? No   Discharge Plan A Home   DME Needed Upon Discharge  none   Patient/Family in Agreement with Plan yes

## 2019-08-13 NOTE — CONSULTS
[facility]  Pulmonology  Consult Note    Patient Name: Liset Bailon  MRN: [id number]  Admission Date: 2019  Hospital Length of Stay: 0 days  Code Status: Full Code  Attending Physician: Cuco Gaitan MD  Primary Care Provider: Primary Doctor No   Principal Problem: Acute on chronic respiratory failure    Inpatient consult to Pulmonology  Consult performed by: Skye Frey MD  Consult ordered by: Hosea Luo DO        Subjective:     HPI:  The patient is a 59-year-old female with hypoventilatory syndrome secondary to morbid obesity.  She began feeling more short of breath prior to leaving on a cruise.  After coming back from the cruise she was more dyspneic and was concerned she might be developing pneumonia and presented to the hospital.  The patient's CT does not show any pulmonary emboli nor does it showed pneumonia.  The patient is hopeful that she can go home this evening.  She wears 2 L of oxygen during the day and CPAP with oxygen bled in at night.  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  She was supposed to have another sleep study because her last 1 was many years ago but they wanted more co-pay then she could pay and so she declined.  She is willing to do an outpatient sleep study on her CPAP and oxygen to see what adjustments need to be made to this.    Past Medical History:   Diagnosis Date    Acute on chronic respiratory failure 2019    Arthritis     Asthma     Chronic bronchitis     Diabetes mellitus     Hyperlipidemia     Hypertension     Pain in joint of left shoulder region 2018    Sleep apnea     Thyroid disease        Past Surgical History:   Procedure Laterality Date    APPENDECTOMY       SECTION      x3    HYSTERECTOMY      OOPHORECTOMY Bilateral     age 42       Review of patient's allergies indicates:   Allergen Reactions    Benadryl allergy decongestant     Codeine        Family History     Problem Relation (Age of Onset)     Pancreatic cancer Mother        Tobacco Use    Smoking status: Never Smoker    Smokeless tobacco: Never Used   Substance and Sexual Activity    Alcohol use: Yes     Comment: occasional    Drug use: No    Sexual activity: Never     Partners: Male     Birth control/protection: See Surgical Hx         Review of Systems   Constitutional: Positive for activity change and fatigue.   HENT: Negative for congestion and sore throat.    Eyes: Negative for pain and redness.   Respiratory: Positive for shortness of breath. Negative for wheezing.    Cardiovascular: Negative for chest pain and palpitations.   Gastrointestinal: Negative for abdominal distention and abdominal pain.   Endocrine: Negative for cold intolerance and heat intolerance.   Genitourinary: Negative for difficulty urinating and frequency.   Musculoskeletal: Positive for arthralgias and back pain.   Allergic/Immunologic: Negative for environmental allergies and immunocompromised state.   Neurological: Negative for dizziness and numbness.   Hematological: Negative for adenopathy. Does not bruise/bleed easily.   Psychiatric/Behavioral: Negative for confusion. The patient is not nervous/anxious.      Objective:     Vital Signs (Most Recent):  Temp: 98.8 °F (37.1 °C) (08/13/19 1600)  Pulse: 92 (08/13/19 1600)  Resp: 18 (08/13/19 1600)  BP: 135/71 (08/13/19 1600)  SpO2: 95 % (08/13/19 1600) Vital Signs (24h Range):  Temp:  [97.8 °F (36.6 °C)-98.8 °F (37.1 °C)] 98.8 °F (37.1 °C)  Pulse:  [] 92  Resp:  [17-26] 18  SpO2:  [91 %-96 %] 95 %  BP: (126-160)/(60-78) 135/71     Weight: 122 kg (269 lb)  Body mass index is 49.2 kg/m².    No intake or output data in the 24 hours ending 08/13/19 1836    Physical Exam   Constitutional: She is oriented to person, place, and time. She appears well-developed and well-nourished.   Morbidly obese   HENT:   Head: Normocephalic and atraumatic.   Eyes: Pupils are equal, round, and reactive to light. EOM are normal.   Neck:  Normal range of motion. Neck supple.   Cardiovascular: Normal rate and regular rhythm.   Pulmonary/Chest: Effort normal and breath sounds normal.   Abdominal: Soft. Bowel sounds are normal.   Obese   Musculoskeletal: Normal range of motion.   Neurological: She is alert and oriented to person, place, and time.   Skin: Skin is warm and dry.   Psychiatric: Her behavior is normal. Thought content normal.   Nursing note and vitals reviewed.             Lines/Drains/Airways          None          Significant Labs:    CBC/Anemia Profile:  Recent Labs   Lab 08/12/19 1848 08/13/19 0613   WBC 6.96 8.59   HGB 12.5 11.8*   HCT 41.9 38.1    230   MCV 86 85   RDW 15.6* 15.6*        Chemistries:  Recent Labs   Lab 08/12/19 1848 08/13/19 0613    139   K 4.2 4.4    100   CO2 33* 28   BUN 20 20   CREATININE 0.7 1.0   CALCIUM 10.0 9.1   ALBUMIN 4.2  --    PROT 8.2  --    BILITOT 0.7  --    ALKPHOS 88  --    ALT 21  --    AST 15  --        ABGs:   Recent Labs   Lab 08/13/19  1356   PH 7.348*   PCO2 49.1*   HCO3 27.0   POCSATURATED 98   BE 1       Significant Imaging:   CXR: I have reviewed all pertinent results/findings within the past 24 hours and my personal findings are:  Clear lungs     CT shows diffuse increased interstitial markings.    Assessment/Plan:     Chronic respiratory failure with hypoxia  No apparent reason for the patient's sensation of dyspnea.  She is on her usual amount of oxygen and CPAP and is indeed hypercapnic but is actually hyperoxic  She has CPAP at 10 and oxygen at 2.5    Dyspnea  Better since admission.    Morbid obesity  Patient states this is not the cause of her obesity hypoventilation syndrome     We will check echocardiogram to make sure that this is mild left heart failure  or cor pulmonale making her more dyspneic.  The patient needs a home sleep study on her current settings and oxygen to assess for nocturnal hypoxemia and quantitate her AHI on 10 of CPAP which is what she  júnior      Thank you for your consult. I will follow-up with patient. Please contact us if you have any additional questions.     Skye Frey MD  Pulmonology  [facility]

## 2019-08-13 NOTE — ED NOTES
PT PRESENTED TO ER WITH C/O SOB AND NONPRODUCTIVE COUGH FOR THE PAST FEW DAYS. PT STATED SHE HAS BEEN HAVING PROBLEMS BREATHING WHILE ON CPAP MACHINE AT NIGHT. NO ACUTE DISTRESS NOTED. FAMILY AT BEDSIDE. O2 SAT 89-90% ON RA. PT PLACED ON O2 AT 3L NC AT THIS TIME.

## 2019-08-13 NOTE — SUBJECTIVE & OBJECTIVE
Past Medical History:   Diagnosis Date    Arthritis     Asthma     Chronic bronchitis     Diabetes mellitus     Hyperlipidemia     Hypertension     Pain in joint of left shoulder region 2018    Sleep apnea     Thyroid disease        Past Surgical History:   Procedure Laterality Date    APPENDECTOMY       SECTION      x3    HYSTERECTOMY      OOPHORECTOMY Bilateral     age 42       Review of patient's allergies indicates:   Allergen Reactions    Benadryl allergy decongestant     Codeine        No current facility-administered medications on file prior to encounter.      Current Outpatient Medications on File Prior to Encounter   Medication Sig    amlodipine (NORVASC) 2.5 MG tablet TK 1 T PO BID    aspirin (ECOTRIN) 81 MG EC tablet Take 81 mg by mouth once daily.    atorvastatin (LIPITOR) 20 MG tablet TK 1 T PO QPM    BASAGLAR KWIKPEN U-100 INSULIN glargine 100 units/mL (3mL) SubQ pen INJECT SUBCUTANEOUS 35 UNITS EVERY MORNING    ergocalciferol (ERGOCALCIFEROL) 50,000 unit Cap ergocalciferol (vitamin D2) 50,000 unit capsule    fluticasone-salmeterol diskus inhaler 250-50 mcg INHALE ONE PUFF INTO THE LUNGS TWICE DAILY    gabapentin (NEURONTIN) 300 MG capsule TAKE ONE CAPSULE BY MOUTH IN THE MORNING, ONE CAPSULE AT NOON, AND 2 CAPSULES BEFORE BED    glimepiride (AMARYL) 4 MG tablet TK 1 T PO D    hydrochlorothiazide (HYDRODIURIL) 25 MG tablet TK 1 T PO D    insulin (BASAGLAR KWIKPEN U-100 INSULIN) glargine 100 units/mL (3mL) SubQ pen Basaglar KwikPen U-100 Insulin 100 unit/mL (3 mL) subcutaneous 35units    insulin aspart U-100 (NOVOLOG FLEXPEN U-100 INSULIN) 100 unit/mL InPn pen Novolog Flexpen U-100 Insulin aspart 100 unit/mL subcutaneous   INJECT 15 UNITS SUBCUTANEOUS THREE TIMES A DAY    irbesartan (AVAPRO) 300 MG tablet TK 1 T PO D    levalbuterol (XOPENEX) 0.63 mg/3 mL nebulizer solution USE 1 VIAL VIA NEBULIZER THREE TIMES DAILY AS NEEDED FOR WHEEZING    levothyroxine  "(SYNTHROID) 25 MCG tablet TK 1 T PO D    liraglutide 0.6 mg/0.1 mL, 18 mg/3 mL, subq PNIJ (VICTOZA 3-KAM) 0.6 mg/0.1 mL (18 mg/3 mL) PnIj Victoza 3-Kam 0.6 mg/0.1 mL (18 mg/3 mL) subcutaneous pen injector    metformin (GLUCOPHAGE) 500 MG tablet TK 1 T PO BID    omeprazole (PRILOSEC) 20 MG capsule TK 1 C PO D PRN    albuterol (PROAIR HFA) 90 mcg/actuation inhaler Inhale 2 puffs into the lungs every 6 (six) hours as needed for Wheezing. Rescue    azithromycin (ZITHROMAX Z-KAM) 250 MG tablet Take 2 po today then 1 a day for 4 days    BD INSULIN SYRINGE ULTRA-FINE 0.5 mL 31 gauge x 5/16 Syrg USE TO INJECT 4X DAILY    benzonatate (TESSALON) 100 MG capsule benzonatate 100 mg capsule    blood sugar diagnostic (FREESTYLE LITE STRIPS MISC) FreeStyle Lite Strips   USE UTD QID    cyclobenzaprine (FLEXERIL) 10 MG tablet cyclobenzaprine 10 mg tablet   TK 1 T PO TID PRF MUSCLE SPASMS    diazePAM (VALIUM) 5 MG tablet diazepam 5 mg tablet    doxycycline (VIBRA-TABS) 100 MG tablet doxycycline hyclate 100 mg tablet    FREESTYLE INSULINX TEST STRIPS Strp TEST BLOOD SUGAR QID    FREESTYLE LANCETS 28 gauge lancets TEST QID    hyaluronate sod, cross-linked (GEL-ONE) 30 mg/3 mL Syrg 3 mLs.    hylan g-f 20 (SYNVISC-ONE) 48 mg/6 mL Syrg 6 mLs.    insulin syringe-needle U-100 (BD INSULIN SYRINGE ULTRA-FINE) 0.5 mL 31 gauge x 5/16" Syrg BD Insulin Syringe Ultra-Fine 0.5 mL 31 gauge x 5/16"    lancets (FREESTYLE LANCETS) 28 gauge Misc FreeStyle Lancets 28 gauge    meloxicam (MOBIC) 15 MG tablet meloxicam 15 mg tablet    nystatin-triamcinolone (MYCOLOG II) cream Apply to affected area 2 times daily    pen needle, diabetic (BD ULTRA-FINE SUSHANT PEN NEEDLE) 32 gauge x 5/32" Ndle BD Ultra-Fine Sushant Pen Needle 32 gauge x 5/32"    sodium hyaluronate, orthovisc, (ORTHOVISC) 30 mg/2 mL Syrg ORTHOVISC 30 mg/2 mL intra-articular syringe    tizanidine (ZANAFLEX) 2 MG tablet TK 1 T PO Q 8 H PRN     Family History     Problem Relation " (Age of Onset)    Pancreatic cancer Mother        Tobacco Use    Smoking status: Never Smoker    Smokeless tobacco: Never Used   Substance and Sexual Activity    Alcohol use: Yes     Comment: occasional    Drug use: No    Sexual activity: Never     Partners: Male     Birth control/protection: See Surgical Hx     Review of Systems   Constitutional: Positive for activity change. Negative for fatigue and fever.   HENT: Negative for congestion, sinus pressure and sore throat.    Respiratory: Positive for cough and shortness of breath. Negative for chest tightness.    Cardiovascular: Negative for chest pain and palpitations.   Gastrointestinal: Negative for abdominal pain, nausea and vomiting.   Genitourinary: Negative for difficulty urinating and dysuria.   Musculoskeletal: Negative for back pain and gait problem.   Neurological: Negative for syncope and weakness.   Psychiatric/Behavioral: Negative for confusion. The patient is not nervous/anxious.      Objective:     Vital Signs (Most Recent):  Temp: 97.7 °F (36.5 °C) (08/12/19 1835)  Pulse: 75 (08/12/19 2301)  Resp: (!) 26 (08/12/19 2230)  BP: (!) 149/70 (08/12/19 2301)  SpO2: 95 % (08/12/19 2301) Vital Signs (24h Range):  Temp:  [97.7 °F (36.5 °C)] 97.7 °F (36.5 °C)  Pulse:  [75-91] 75  Resp:  [16-26] 26  SpO2:  [92 %-95 %] 95 %  BP: (132-149)/(65-71) 149/70     Weight: 122 kg (269 lb)  Body mass index is 49.2 kg/m².    Physical Exam   Constitutional: She is oriented to person, place, and time. She appears well-developed and well-nourished.   Patient appears older than stated age   HENT:   Head: Normocephalic and atraumatic.   Eyes: Conjunctivae are normal. No scleral icterus.   Neck: Normal range of motion. Neck supple.   Cardiovascular: Normal rate, regular rhythm and normal heart sounds.   Pulmonary/Chest:   Course breath sounds moderate effort occasional rhonchi and no wheeze   Abdominal: Soft. Bowel sounds are normal. There is no tenderness.   Obese    Musculoskeletal: Normal range of motion.   Right dorsal aspect of hand with ecchymosis, radial pulses 2+  Lower extremities with 1+ edema bilaterally. Chronic   Neurological: She is alert and oriented to person, place, and time.   Skin: Skin is warm. No rash noted.   Psychiatric: She has a normal mood and affect. Her behavior is normal.   Nursing note and vitals reviewed.          Significant Labs:   BMP:   Recent Labs   Lab 08/12/19  1848   *      K 4.2      CO2 33*   BUN 20   CREATININE 0.7   CALCIUM 10.0     CBC:   Recent Labs   Lab 08/12/19  1848   WBC 6.96   HGB 12.5   HCT 41.9        CMP:   Recent Labs   Lab 08/12/19  1848      K 4.2      CO2 33*   *   BUN 20   CREATININE 0.7   CALCIUM 10.0   PROT 8.2   ALBUMIN 4.2   BILITOT 0.7   ALKPHOS 88   AST 15   ALT 21   ANIONGAP 9   EGFRNONAA >60.0     Troponin:   Recent Labs   Lab 08/12/19 1848   TROPONINI <0.030       Significant Imaging:  Chest x-ray reviewed personally by me no acute cardiopulmonary process  EKG read personally by me normal sinus rhythm no acute ST segment changes

## 2019-08-13 NOTE — PLAN OF CARE
08/13/19 0755   Patient Assessment/Suction   Respiratory Effort Normal;Unlabored   Expansion/Accessory Muscles/Retractions no use of accessory muscles   All Lung Fields Breath Sounds clear   Rhythm/Pattern, Respiratory unlabored   Cough Frequency infrequent   Cough Type nonproductive   PRE-TX-O2   O2 Device (Oxygen Therapy) nasal cannula  (with Cpap)   $ Is the patient on Low Flow Oxygen? Yes   Flow (L/min) 3   SpO2 95 %   Pulse Oximetry Type Intermittent   $ Pulse Oximetry - Multiple Charge Pulse Oximetry - Multiple   Pulse 98   Resp 17   Aerosol Therapy   $ Aerosol Therapy Charges PRN treatment not required   Inhaler   $ Inhaler Charges MDI (Metered Dose Inahler) Treatment;Mouth rinsed post treatment   Daily Review of Necessity (Inhaler) completed   Respiratory Treatment Status (Inhaler) given   Treatment Route (Inhaler) mouthpiece   Patient Position (Inhaler) semi-Perera's   Post Treatment Assessment (Inhaler) increased aeration   Signs of Intolerance (Inhaler) none   Breath Sounds Post-Respiratory Treatment   Throughout All Fields Post-Treatment All Fields   Throughout All Fields Post-Treatment clear   Post-treatment Heart Rate (beats/min) 98   Post-treatment Resp Rate (breaths/min) 18   Preset CPAP/BiPAP Settings   Mode Of Delivery BiPAP;Standby

## 2019-08-13 NOTE — NURSING
Blood glucose 401. Called into Dr. Gaitan. He states he is comfortable with discharging her at this time. She will be re-starting her metformin at home and has long acting insulin in her system along with novolog. No new orders were given. Pt agreed to discharge and states she will probably do better on her own regimen of insulin at home.

## 2019-08-13 NOTE — CARE UPDATE
08/13/19 0022   Patient Assessment/Suction   Level of Consciousness (AVPU) alert   Respiratory Effort Normal   Expansion/Accessory Muscles/Retractions no use of accessory muscles   All Lung Fields Breath Sounds diminished;clear   PRE-TX-O2   O2 Device (Oxygen Therapy) nasal cannula   $ Is the patient on Low Flow Oxygen? Yes   Flow (L/min) 2   SpO2 (!) 94 %   Pulse 74   Resp (!) 24   Aerosol Therapy   $ Aerosol Therapy Charges Aerosol Treatment;Initial Continuous   Daily Review of Necessity (SVN) completed   Respiratory Treatment Status (SVN) given   Treatment Route (SVN) mask   Patient Position (SVN) semi-Perera's   Post Treatment Assessment (SVN) breath sounds unchanged   Respiratory Interventions   Cough And Deep Breathing done with encouragement   Breathing Techniques/Airway Clearance deep/controlled cough encouraged;diaphragmatic breathing promoted   Instructed deep diaphragmatic breathing techniques with Resp Nebs.

## 2019-08-13 NOTE — HOSPITAL COURSE
59-year-old female history of JASKARAN on 2 L at night, obesity comes in for soreness of breath. Pulmonology was consulted. Suspected patient to have OHS. ABG was unremarkable. Pulmonary nodule recommended home sleep study. Patient was stable at discharge with the structures follow-up with pulmonology.

## 2019-08-13 NOTE — DISCHARGE SUMMARY
[facility]  Discharge Summary      Patient Name: Liset Bailon  MRN: [id number]  Admission Date: 8/12/2019  Hospital Length of Stay: 0 days  Discharge Date and Time:  08/13/2019 3:37 PM  Attending Physician: Cuco Gaitan MD   Discharging Provider: Cuco Gaitan MD  Primary Care Provider: Primary Doctor No      HPI:   Patient 58-year-old female who states she has been well in a usual state of health.  Patient was recently on a cruise and was at her baseline.  For the last 2 days she has had increasing shortness of breath.  Patient uses 2 L of home O2 and CPAP at night.  She states during the last 2 days she has had increasing shortness of breath worse with exertion, and has PND.  Patient states when she falls asleep she awakens suddenly like she does when she does not use her CPAP.  Patient denies fever chills chest pain.  There is no history of blood clots.    * No surgery found *      Hospital Course:   59-year-old female history of JASKARAN on 2 L at night, obesity comes in for soreness of breath. Pulmonology was consulted. Suspected patient to have OHS. ABG was unremarkable. Pulmonary nodule recommended home sleep study. Patient was stable at discharge with the structures follow-up with pulmonology.     General: Patient resting comfortably in no acute distress. Appears as stated age. Calm. Obese  Eyes: EOM intact. No conjunctivae injection. No scleral icterus.  ENT: Hearing grossly intact. No discharge from ears. No nasal discharge.   CVS: RRR. No LE edema BL.  Lungs: CTA BL, no wheezing or crackles. Decreased breath sounds due to body habitus. No accessory muscle use. No acute respiratory distress  Neuro: AOx3. GCS 15. Cranial nerves grossly intact. Moves all extremities equally. Follows commands. Responds appropriately    Consults:   Consults (From admission, onward)        Status Ordering Provider     Inpatient consult to Hospitalist  Once     Provider:  Hosea Luo,      Acknowledged JEREMY YUAN     Inpatient consult to Pulmonology  Once     Provider:  Percy Guzmán MD    Acknowledged JEREMY YUAN          No new Assessment & Plan notes have been filed under this hospital service since the last note was generated.  Service: Hospital Medicine    Final Active Diagnoses:    Diagnosis Date Noted POA    Type 2 diabetes mellitus, with long-term current use of insulin [E11.9, Z79.4] 08/13/2019 Not Applicable    Obstructive sleep apnea syndrome [G47.33] 07/25/2017 Yes    Morbid obesity [E66.01] 07/25/2017 Yes    Chronic respiratory failure with hypoxia [J96.11] 07/25/2017 Yes      Problems Resolved During this Admission:    Diagnosis Date Noted Date Resolved POA    PRINCIPAL PROBLEM:  Acute on chronic respiratory failure [J96.20] 08/13/2019 08/13/2019 Yes       Discharged Condition: fair    Disposition: Home or Self Care    Follow Up:  Follow-up Information     Percy Guzmán MD In 1 week.    Specialty:  Pulmonary Disease  Why:  For check up s/p hospital discharge  Contact information:  [address] [place] [address]  [phone]  [facility] [place] [address]  [phone]                 Patient Instructions:      Home Sleep Studies   Standing Status: Future Standing Exp. Date: 08/13/20   Order Comments: On current CPAP     Diet Cardiac     Notify your health care provider if you experience any of the following:  temperature >100.4     Notify your health care provider if you experience any of the following:  persistent nausea and vomiting or diarrhea     Notify your health care provider if you experience any of the following:  severe uncontrolled pain     Notify your health care provider if you experience any of the following:  redness, tenderness, or signs of infection (pain, swelling, redness, odor or green/yellow discharge around incision site)     Notify your health care provider if you experience any of the following:  difficulty breathing or increased cough      Notify your health care provider if you experience any of the following:  severe persistent headache     Notify your health care provider if you experience any of the following:  worsening rash     Notify your health care provider if you experience any of the following:  persistent dizziness, light-headedness, or visual disturbances     Notify your health care provider if you experience any of the following:  increased confusion or weakness     Activity as tolerated       Significant Diagnostic Studies: Labs:   CMP   Recent Labs   Lab 08/12/19  1848 08/13/19  0613    139   K 4.2 4.4    100   CO2 33* 28   * 450*   BUN 20 20   CREATININE 0.7 1.0   CALCIUM 10.0 9.1   PROT 8.2  --    ALBUMIN 4.2  --    BILITOT 0.7  --    ALKPHOS 88  --    AST 15  --    ALT 21  --    ANIONGAP 9 11   ESTGFRAFRICA >60.0 >60.0   EGFRNONAA >60.0 >60.0    and CBC   Recent Labs   Lab 08/12/19  1848 08/13/19  0613   WBC 6.96 8.59   HGB 12.5 11.8*   HCT 41.9 38.1    230       Pending Diagnostic Studies:     Procedure Component Value Units Date/Time    Transthoracic echo (TTE) Complete [[id number]]  (Abnormal) Resulted:  08/13/19 1432    Order Status:  Sent Lab Status:  In process Updated:  08/13/19 1436     BSA 2.31 m2      TDI SEPTAL 0.16 m/s      LV LATERAL E/E' RATIO 11.45 m/s      LV SEPTAL E/E' RATIO 7.88 m/s      AORTIC VALVE CUSP SEPERATION 2.11 cm      TDI LATERAL 0.11 m/s      LVIDD 5.68 cm      IVS 1.27 cm      PW 1.34 cm      Ao root annulus 2.87 cm      LVIDS 3.87 cm      FS 32 %      LV mass 322.15 g      LA size 4.77 cm      RVDD 151.00 cm      Left Ventricle Relative Wall Thickness 0.47 cm      AV mean gradient 9 mmHg      AV valve area 2.06 cm2      AV Velocity Ratio 64.00     AV index (prosthetic) 0.74     E/A ratio 0.95     Mean e' 0.14 m/s      E wave decelartion time 171.01 msec      IVRT 50.10 msec      LVOT diameter 1.88 cm      LVOT area 2.8 cm2      LVOT peak alfredo 132.49 m/s      LVOT peak VTI  "27.99 cm      Ao peak mariano 2.07 m/s      Ao VTI 37.68 cm      LVOT stroke volume 77.66 cm3      AV peak gradient 17 mmHg      E/E' ratio 9.33 m/s      MV Peak E Mariano 1.26 m/s      MV Peak A Mariano 1.33 m/s      LV Systolic Volume 84.90 mL      LV Systolic Volume Index 39.2 mL/m2      LV Diastolic Volume 198.20 mL      LV Diastolic Volume Index 91.42 mL/m2      LV Mass Index 149 g/m2     Narrative:       · Concentric left ventricular hypertrophy.       Impression:                Medications:  Reconciled Home Medications:      Medication List      CONTINUE taking these medications    albuterol 90 mcg/actuation inhaler  Commonly known as:  PROAIR HFA  Inhale 2 puffs into the lungs every 6 (six) hours as needed for Wheezing. Rescue     amLODIPine 2.5 MG tablet  Commonly known as:  NORVASC  TK 1 T PO BID     aspirin 81 MG EC tablet  Commonly known as:  ECOTRIN  Take 81 mg by mouth once daily.     atorvastatin 20 MG tablet  Commonly known as:  LIPITOR  TK 1 T PO QPM     * BASAGLAR KWIKPEN U-100 INSULIN glargine 100 units/mL (3mL) SubQ pen  Generic drug:  insulin  Basaglar KwikPen U-100 Insulin 100 unit/mL (3 mL) subcutaneous 35units     * BASAGLAR KWIKPEN U-100 INSULIN glargine 100 units/mL (3mL) SubQ pen  Generic drug:  insulin  INJECT SUBCUTANEOUS 35 UNITS EVERY MORNING     * BD INSULIN SYRINGE ULTRA-FINE 0.5 mL 31 gauge x 5/16" Syrg  Generic drug:  insulin syringe-needle U-100  BD Insulin Syringe Ultra-Fine 0.5 mL 31 gauge x 5/16"     * BD INSULIN SYRINGE ULTRA-FINE 0.5 mL 31 gauge x 5/16" Syrg  Generic drug:  insulin syringe-needle U-100  USE TO INJECT 4X DAILY     BD ULTRA-FINE SUSHANT PEN NEEDLE 32 gauge x 5/32" Ndle  Generic drug:  pen needle, diabetic  BD Ultra-Fine Sushant Pen Needle 32 gauge x 5/32"     benzonatate 100 MG capsule  Commonly known as:  TESSALON  benzonatate 100 mg capsule     cyclobenzaprine 10 MG tablet  Commonly known as:  FLEXERIL  cyclobenzaprine 10 mg tablet   TK 1 T PO TID PRF MUSCLE SPASMS   "   diazePAM 5 MG tablet  Commonly known as:  VALIUM  diazepam 5 mg tablet     doxycycline 100 MG tablet  Commonly known as:  VIBRA-TABS  doxycycline hyclate 100 mg tablet     ergocalciferol 50,000 unit Cap  Commonly known as:  ERGOCALCIFEROL  ergocalciferol (vitamin D2) 50,000 unit capsule     fluticasone-salmeterol 250-50 mcg/dose 250-50 mcg/dose diskus inhaler  Commonly known as:  ADVAIR DISKUS  INHALE ONE PUFF INTO THE LUNGS TWICE DAILY     * FREESTYLE LITE STRIPS MISC  FreeStyle Lite Strips   USE UTD QID     * FREESTYLE INSULINX TEST STRIPS Strp  Generic drug:  blood sugar diagnostic  TEST BLOOD SUGAR QID     * FREESTYLE LANCETS 28 gauge Misc  Generic drug:  lancets  FreeStyle Lancets 28 gauge     * FREESTYLE LANCETS 28 gauge Misc  Generic drug:  lancets  TEST QID     gabapentin 300 MG capsule  Commonly known as:  NEURONTIN  TAKE ONE CAPSULE BY MOUTH IN THE MORNING, ONE CAPSULE AT NOON, AND 2 CAPSULES BEFORE BED     GEL-ONE 30 mg/3 mL  Generic drug:  hyaluronate sod, cross-linked  3 mLs.     glimepiride 4 MG tablet  Commonly known as:  AMARYL  TK 1 T PO D     hydroCHLOROthiazide 25 MG tablet  Commonly known as:  HYDRODIURIL  TK 1 T PO D     irbesartan 300 MG tablet  Commonly known as:  AVAPRO  TK 1 T PO D     levalbuterol 0.63 mg/3 mL nebulizer solution  Commonly known as:  XOPENEX  USE 1 VIAL VIA NEBULIZER THREE TIMES DAILY AS NEEDED FOR WHEEZING     levothyroxine 25 MCG tablet  Commonly known as:  SYNTHROID  TK 1 T PO D     meloxicam 15 MG tablet  Commonly known as:  MOBIC  meloxicam 15 mg tablet     metFORMIN 500 MG tablet  Commonly known as:  GLUCOPHAGE  TK 1 T PO BID     NovoLOG Flexpen U-100 Insulin 100 unit/mL (3 mL) Inpn pen  Generic drug:  insulin aspart U-100  Novolog Flexpen U-100 Insulin aspart 100 unit/mL subcutaneous   INJECT 15 UNITS SUBCUTANEOUS THREE TIMES A DAY     nystatin-triamcinolone cream  Commonly known as:  MYCOLOG II  Apply to affected area 2 times daily     omeprazole 20 MG  capsule  Commonly known as:  PRILOSEC  TK 1 C PO D PRN     ORTHOVISC 30 mg/2 mL Syrg  Generic drug:  sodium hyaluronate (orthovisc)  ORTHOVISC 30 mg/2 mL intra-articular syringe     SYNVISC-ONE 48 mg/6 mL Syrg  Generic drug:  hylan g-f 20  6 mLs.     tiZANidine 2 MG tablet  Commonly known as:  ZANAFLEX  TK 1 T PO Q 8 H PRN     VICTOZA 3-KAM 0.6 mg/0.1 mL (18 mg/3 mL) Pnij  Generic drug:  liraglutide 0.6 mg/0.1 mL (18 mg/3 mL) subq PNIJ  Victoza 3-Kam 0.6 mg/0.1 mL (18 mg/3 mL) subcutaneous pen injector         * This list has 8 medication(s) that are the same as other medications prescribed for you. Read the directions carefully, and ask your doctor or other care provider to review them with you.            STOP taking these medications    azithromycin 250 MG tablet  Commonly known as:  ZITHROMAX Z-KAM            Indwelling Lines/Drains at time of discharge:   Lines/Drains/Airways          None          Time spent on the discharge of patient: 35 minutes  Patient was seen and examined on the date of discharge and determined to be suitable for discharge.         Cuco Gaitan MD  Department of Hospital Medicine  [facility]

## 2019-08-13 NOTE — ASSESSMENT & PLAN NOTE
No apparent reason for the patient's sensation of dyspnea.  She is on her usual amount of oxygen and CPAP and is indeed hypercapnic but is actually hyperoxic  She has CPAP at 10 and oxygen at 2.5

## 2019-08-13 NOTE — HPI
The patient is a 59-year-old female with hypoventilatory syndrome secondary to morbid obesity.  She began feeling more short of breath prior to leaving on a cruise.  After coming back from the cruise she was more dyspneic and was concerned she might be developing pneumonia and presented to the hospital.  The patient's CT does not show any pulmonary emboli nor does it showed pneumonia.  The patient is hopeful that she can go home this evening.  She wears 2 L of oxygen during the day and CPAP with oxygen bled in at night.  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  She was supposed to have another sleep study because her last 1 was many years ago but they wanted more co-pay then she could pay and so she declined.  She is willing to do an outpatient sleep study on her CPAP and oxygen to see what adjustments need to be made to this.

## 2019-08-30 ENCOUNTER — TELEPHONE (OUTPATIENT)
Dept: PULMONOLOGY | Facility: CLINIC | Age: 59
End: 2019-08-30

## 2019-08-30 RX ORDER — AZITHROMYCIN 250 MG/1
TABLET, FILM COATED ORAL
Qty: 6 TABLET | Refills: 0 | Status: SHIPPED | OUTPATIENT
Start: 2019-08-30 | End: 2019-11-20 | Stop reason: SDUPTHER

## 2019-08-30 NOTE — TELEPHONE ENCOUNTER
ASKING FOR AN ABX FOR COUGH-PRODUCTIVE X 3 DAYS, AND CHEST CONGESTION. SATS DROPPED INTO 80'S LAST WEEK SO REPORTED TO ED, SATS CAME UP SO SENT HOME. MEDS GIVEN WHILE THERE AND HOME ON STEROID. NO ANTIBIOTIC

## 2019-08-30 NOTE — TELEPHONE ENCOUNTER
----- Message from Leonidas Tang sent at 8/29/2019 11:58 AM CDT -----  Contact: Self, [phone]  The patient called requesting an antibiotic medication.  She didn't state any symptoms when asked. Advised the patient that Dr. Guzmán or his nurse Joy, will call her back.  Please call and assist and thanks in advance.

## 2019-09-12 ENCOUNTER — TELEPHONE (OUTPATIENT)
Dept: PULMONOLOGY | Facility: CLINIC | Age: 59
End: 2019-09-12

## 2019-09-12 NOTE — TELEPHONE ENCOUNTER
----- Message from Trey Puentes MA sent at 9/11/2019 11:36 AM CDT -----  Regarding: FW: Consult visit needed   Can you please call pt to schedule an apt.   ----- Message -----  From: Shira Benedict  Sent: 9/11/2019  11:34 AM  To: [facility] Staff  Subject: Consult visit needed                             Dr. Cuco Gaitan has ordered a home sleep study for this patient on 08/13/2019 - please contact patient for a consult visit for this patient.  Thanks.

## 2019-11-20 ENCOUNTER — TELEPHONE (OUTPATIENT)
Dept: PULMONOLOGY | Facility: CLINIC | Age: 59
End: 2019-11-20

## 2019-11-20 RX ORDER — AZITHROMYCIN 250 MG/1
TABLET, FILM COATED ORAL
Qty: 6 TABLET | Refills: 0 | Status: ON HOLD | OUTPATIENT
Start: 2019-11-20 | End: 2019-12-01 | Stop reason: HOSPADM

## 2019-11-26 ENCOUNTER — TELEPHONE (OUTPATIENT)
Dept: PULMONOLOGY | Facility: CLINIC | Age: 59
End: 2019-11-26

## 2019-11-26 NOTE — TELEPHONE ENCOUNTER
Having trouble with oxygen sometimes dropping into 80's, comes up when takes deep breaths. If she walks around house gets very SOB. After she eats its worse. On CPAP at night. O2 on 3L, continuous.

## 2019-11-27 ENCOUNTER — HOSPITAL ENCOUNTER (INPATIENT)
Facility: HOSPITAL | Age: 59
LOS: 4 days | Discharge: HOME | DRG: 189 | End: 2019-12-01
Attending: EMERGENCY MEDICINE | Admitting: INTERNAL MEDICINE
Payer: COMMERCIAL

## 2019-11-27 ENCOUNTER — OFFICE VISIT (OUTPATIENT)
Dept: PULMONOLOGY | Facility: CLINIC | Age: 59
End: 2019-11-27
Payer: COMMERCIAL

## 2019-11-27 VITALS
BODY MASS INDEX: 50.05 KG/M2 | HEART RATE: 99 BPM | HEIGHT: 62 IN | DIASTOLIC BLOOD PRESSURE: 80 MMHG | OXYGEN SATURATION: 87 % | SYSTOLIC BLOOD PRESSURE: 126 MMHG | WEIGHT: 272 LBS

## 2019-11-27 DIAGNOSIS — J96.92 RESPIRATORY FAILURE WITH HYPOXIA AND HYPERCAPNIA: ICD-10-CM

## 2019-11-27 DIAGNOSIS — R09.02 HYPOXIA: ICD-10-CM

## 2019-11-27 DIAGNOSIS — J96.91 RESPIRATORY FAILURE WITH HYPOXIA AND HYPERCAPNIA: ICD-10-CM

## 2019-11-27 DIAGNOSIS — E66.01 MORBID OBESITY: ICD-10-CM

## 2019-11-27 DIAGNOSIS — J96.21 ACUTE ON CHRONIC RESPIRATORY FAILURE WITH HYPOXIA AND HYPERCAPNIA: Primary | ICD-10-CM

## 2019-11-27 DIAGNOSIS — G47.33 OBSTRUCTIVE SLEEP APNEA SYNDROME: ICD-10-CM

## 2019-11-27 DIAGNOSIS — J96.22 ACUTE ON CHRONIC RESPIRATORY FAILURE WITH HYPOXIA AND HYPERCAPNIA: Primary | ICD-10-CM

## 2019-11-27 DIAGNOSIS — J44.1 COPD EXACERBATION: Primary | ICD-10-CM

## 2019-11-27 DIAGNOSIS — J96.21 ACUTE ON CHRONIC RESPIRATORY FAILURE WITH HYPOXIA AND HYPERCAPNIA: ICD-10-CM

## 2019-11-27 DIAGNOSIS — I48.0 PAROXYSMAL ATRIAL FIBRILLATION WITH RAPID VENTRICULAR RESPONSE: ICD-10-CM

## 2019-11-27 DIAGNOSIS — J45.30 MILD PERSISTENT ASTHMA WITHOUT COMPLICATION: ICD-10-CM

## 2019-11-27 DIAGNOSIS — J96.22 ACUTE ON CHRONIC RESPIRATORY FAILURE WITH HYPOXIA AND HYPERCAPNIA: ICD-10-CM

## 2019-11-27 DIAGNOSIS — J96.11 CHRONIC RESPIRATORY FAILURE WITH HYPOXIA: ICD-10-CM

## 2019-11-27 DIAGNOSIS — E66.2 OBESITY HYPOVENTILATION SYNDROME: ICD-10-CM

## 2019-11-27 PROBLEM — I48.91 ATRIAL FIBRILLATION: Status: ACTIVE | Noted: 2019-11-27

## 2019-11-27 LAB
ALBUMIN SERPL BCP-MCNC: 3.8 G/DL (ref 3.5–5.2)
ALLENS TEST: ABNORMAL
ALLENS TEST: ABNORMAL
ALP SERPL-CCNC: 79 U/L (ref 55–135)
ALT SERPL W/O P-5'-P-CCNC: 12 U/L (ref 10–44)
ANION GAP SERPL CALC-SCNC: 10 MMOL/L (ref 8–16)
AST SERPL-CCNC: 13 U/L (ref 10–40)
BASOPHILS # BLD AUTO: 0.02 K/UL (ref 0–0.2)
BASOPHILS NFR BLD: 0.2 % (ref 0–1.9)
BILIRUB SERPL-MCNC: 0.9 MG/DL (ref 0.1–1)
BNP SERPL-MCNC: 22 PG/ML (ref 0–99)
BUN SERPL-MCNC: 31 MG/DL (ref 6–20)
CALCIUM SERPL-MCNC: 9 MG/DL (ref 8.7–10.5)
CHLORIDE SERPL-SCNC: 97 MMOL/L (ref 95–110)
CO2 SERPL-SCNC: 32 MMOL/L (ref 23–29)
CREAT SERPL-MCNC: 1.1 MG/DL (ref 0.5–1.4)
DELSYS: ABNORMAL
DELSYS: ABNORMAL
DIFFERENTIAL METHOD: ABNORMAL
EOSINOPHIL # BLD AUTO: 0.2 K/UL (ref 0–0.5)
EOSINOPHIL NFR BLD: 2.3 % (ref 0–8)
ERYTHROCYTE [DISTWIDTH] IN BLOOD BY AUTOMATED COUNT: 16.7 % (ref 11.5–14.5)
EST. GFR  (AFRICAN AMERICAN): >60 ML/MIN/1.73 M^2
EST. GFR  (NON AFRICAN AMERICAN): 55.9 ML/MIN/1.73 M^2
FLOW: 3
FLOW: 3
GLUCOSE SERPL-MCNC: 130 MG/DL (ref 70–110)
GLUCOSE SERPL-MCNC: 224 MG/DL (ref 70–110)
HCO3 UR-SCNC: 35.6 MMOL/L (ref 24–28)
HCO3 UR-SCNC: 36.2 MMOL/L (ref 24–28)
HCT VFR BLD AUTO: 38.1 % (ref 37–48.5)
HGB BLD-MCNC: 10.9 G/DL (ref 12–16)
IMM GRANULOCYTES # BLD AUTO: 0.08 K/UL (ref 0–0.04)
IMM GRANULOCYTES NFR BLD AUTO: 0.9 % (ref 0–0.5)
LYMPHOCYTES # BLD AUTO: 1.3 K/UL (ref 1–4.8)
LYMPHOCYTES NFR BLD: 14.1 % (ref 18–48)
MAGNESIUM SERPL-MCNC: 1.9 MG/DL (ref 1.6–2.6)
MCH RBC QN AUTO: 23.3 PG (ref 27–31)
MCHC RBC AUTO-ENTMCNC: 28.6 G/DL (ref 32–36)
MCV RBC AUTO: 82 FL (ref 82–98)
MODE: ABNORMAL
MODE: ABNORMAL
MONOCYTES # BLD AUTO: 0.4 K/UL (ref 0.3–1)
MONOCYTES NFR BLD: 3.9 % (ref 4–15)
NEUTROPHILS # BLD AUTO: 7.1 K/UL (ref 1.8–7.7)
NEUTROPHILS NFR BLD: 78.6 % (ref 38–73)
NRBC BLD-RTO: 0 /100 WBC
PCO2 BLDA: 62.4 MMHG (ref 35–45)
PCO2 BLDA: 71 MMHG (ref 35–45)
PH SMN: 7.32 [PH] (ref 7.35–7.45)
PH SMN: 7.36 [PH] (ref 7.35–7.45)
PLATELET # BLD AUTO: 242 K/UL (ref 150–350)
PMV BLD AUTO: 10.3 FL (ref 9.2–12.9)
PO2 BLDA: 33 MMHG (ref 40–60)
PO2 BLDA: 59 MMHG (ref 80–100)
POC BE: 10 MMOL/L
POC BE: 10 MMOL/L
POC SATURATED O2: 55 % (ref 95–100)
POC SATURATED O2: 88 % (ref 95–100)
POC TCO2: 37 MMOL/L (ref 23–27)
POC TCO2: 38 MMOL/L (ref 24–29)
POTASSIUM SERPL-SCNC: 4.3 MMOL/L (ref 3.5–5.1)
PROT SERPL-MCNC: 7.6 G/DL (ref 6–8.4)
RBC # BLD AUTO: 4.67 M/UL (ref 4–5.4)
SAMPLE: ABNORMAL
SAMPLE: ABNORMAL
SITE: ABNORMAL
SITE: ABNORMAL
SODIUM SERPL-SCNC: 139 MMOL/L (ref 136–145)
TROPONIN I SERPL DL<=0.01 NG/ML-MCNC: <0.03 NG/ML (ref 0.02–0.04)
TROPONIN I SERPL DL<=0.01 NG/ML-MCNC: <0.03 NG/ML (ref 0.02–0.04)
TSH SERPL DL<=0.005 MIU/L-ACNC: 3.47 UIU/ML (ref 0.34–5.6)
WBC # BLD AUTO: 8.99 K/UL (ref 3.9–12.7)

## 2019-11-27 PROCEDURE — 93005 ELECTROCARDIOGRAM TRACING: CPT

## 2019-11-27 PROCEDURE — 25000242 PHARM REV CODE 250 ALT 637 W/ HCPCS: Performed by: INTERNAL MEDICINE

## 2019-11-27 PROCEDURE — 80053 COMPREHEN METABOLIC PANEL: CPT

## 2019-11-27 PROCEDURE — 84484 ASSAY OF TROPONIN QUANT: CPT | Mod: 91

## 2019-11-27 PROCEDURE — 94761 N-INVAS EAR/PLS OXIMETRY MLT: CPT

## 2019-11-27 PROCEDURE — 20000000 HC ICU ROOM

## 2019-11-27 PROCEDURE — 21400001 HC TELEMETRY ROOM

## 2019-11-27 PROCEDURE — 83880 ASSAY OF NATRIURETIC PEPTIDE: CPT

## 2019-11-27 PROCEDURE — 85025 COMPLETE CBC W/AUTO DIFF WBC: CPT

## 2019-11-27 PROCEDURE — 99900035 HC TECH TIME PER 15 MIN (STAT)

## 2019-11-27 PROCEDURE — 25000242 PHARM REV CODE 250 ALT 637 W/ HCPCS: Performed by: EMERGENCY MEDICINE

## 2019-11-27 PROCEDURE — 94660 CPAP INITIATION&MGMT: CPT

## 2019-11-27 PROCEDURE — 84443 ASSAY THYROID STIM HORMONE: CPT

## 2019-11-27 PROCEDURE — 63600175 PHARM REV CODE 636 W HCPCS: Performed by: INTERNAL MEDICINE

## 2019-11-27 PROCEDURE — 94644 CONT INHLJ TX 1ST HOUR: CPT

## 2019-11-27 PROCEDURE — 27000221 HC OXYGEN, UP TO 24 HOURS

## 2019-11-27 PROCEDURE — 82962 GLUCOSE BLOOD TEST: CPT

## 2019-11-27 PROCEDURE — 99222 1ST HOSP IP/OBS MODERATE 55: CPT | Mod: ,,, | Performed by: INTERNAL MEDICINE

## 2019-11-27 PROCEDURE — 82803 BLOOD GASES ANY COMBINATION: CPT

## 2019-11-27 PROCEDURE — 99291 CRITICAL CARE FIRST HOUR: CPT

## 2019-11-27 PROCEDURE — 25000003 PHARM REV CODE 250: Performed by: INTERNAL MEDICINE

## 2019-11-27 PROCEDURE — 94640 AIRWAY INHALATION TREATMENT: CPT

## 2019-11-27 PROCEDURE — 36600 WITHDRAWAL OF ARTERIAL BLOOD: CPT

## 2019-11-27 PROCEDURE — 36415 COLL VENOUS BLD VENIPUNCTURE: CPT

## 2019-11-27 PROCEDURE — 83735 ASSAY OF MAGNESIUM: CPT

## 2019-11-27 PROCEDURE — 99222 PR INITIAL HOSPITAL CARE,LEVL II: ICD-10-PCS | Mod: ,,, | Performed by: INTERNAL MEDICINE

## 2019-11-27 RX ORDER — FLUTICASONE FUROATE AND VILANTEROL 100; 25 UG/1; UG/1
1 POWDER RESPIRATORY (INHALATION) DAILY
Status: DISCONTINUED | OUTPATIENT
Start: 2019-11-28 | End: 2019-12-01 | Stop reason: HOSPADM

## 2019-11-27 RX ORDER — PANTOPRAZOLE SODIUM 40 MG/1
40 TABLET, DELAYED RELEASE ORAL DAILY
Status: DISCONTINUED | OUTPATIENT
Start: 2019-11-28 | End: 2019-12-01 | Stop reason: HOSPADM

## 2019-11-27 RX ORDER — IBUPROFEN 200 MG
16 TABLET ORAL
Status: DISCONTINUED | OUTPATIENT
Start: 2019-11-27 | End: 2019-12-01 | Stop reason: HOSPADM

## 2019-11-27 RX ORDER — IPRATROPIUM BROMIDE AND ALBUTEROL SULFATE 2.5; .5 MG/3ML; MG/3ML
3 SOLUTION RESPIRATORY (INHALATION)
Status: DISCONTINUED | OUTPATIENT
Start: 2019-11-27 | End: 2019-12-01 | Stop reason: HOSPADM

## 2019-11-27 RX ORDER — ENOXAPARIN SODIUM 100 MG/ML
40 INJECTION SUBCUTANEOUS
Status: DISCONTINUED | OUTPATIENT
Start: 2019-11-27 | End: 2019-11-27

## 2019-11-27 RX ORDER — LEVALBUTEROL INHALATION SOLUTION 0.63 MG/3ML
0.63 SOLUTION RESPIRATORY (INHALATION)
Status: DISCONTINUED | OUTPATIENT
Start: 2019-11-27 | End: 2019-11-27

## 2019-11-27 RX ORDER — IRBESARTAN 150 MG/1
300 TABLET ORAL DAILY
Status: DISCONTINUED | OUTPATIENT
Start: 2019-11-28 | End: 2019-12-01 | Stop reason: HOSPADM

## 2019-11-27 RX ORDER — ENOXAPARIN SODIUM 100 MG/ML
30 INJECTION SUBCUTANEOUS EVERY 24 HOURS
Status: DISCONTINUED | OUTPATIENT
Start: 2019-11-27 | End: 2019-11-27

## 2019-11-27 RX ORDER — ATORVASTATIN CALCIUM 20 MG/1
20 TABLET, FILM COATED ORAL NIGHTLY
Status: DISCONTINUED | OUTPATIENT
Start: 2019-11-27 | End: 2019-12-01 | Stop reason: HOSPADM

## 2019-11-27 RX ORDER — INSULIN ASPART 100 [IU]/ML
1-10 INJECTION, SOLUTION INTRAVENOUS; SUBCUTANEOUS
Status: DISCONTINUED | OUTPATIENT
Start: 2019-11-27 | End: 2019-12-01 | Stop reason: HOSPADM

## 2019-11-27 RX ORDER — GABAPENTIN 300 MG/1
300 CAPSULE ORAL 3 TIMES DAILY
Status: DISCONTINUED | OUTPATIENT
Start: 2019-11-27 | End: 2019-12-01 | Stop reason: HOSPADM

## 2019-11-27 RX ORDER — ERGOCALCIFEROL (VITAMIN D2) 200 MCG/ML
8000 DROPS ORAL DAILY
Status: DISCONTINUED | OUTPATIENT
Start: 2019-11-28 | End: 2019-12-01 | Stop reason: HOSPADM

## 2019-11-27 RX ORDER — HYDROCHLOROTHIAZIDE 25 MG/1
25 TABLET ORAL DAILY
Status: DISCONTINUED | OUTPATIENT
Start: 2019-11-28 | End: 2019-12-01 | Stop reason: HOSPADM

## 2019-11-27 RX ORDER — LEVOFLOXACIN 750 MG/1
750 TABLET ORAL DAILY
Status: DISCONTINUED | OUTPATIENT
Start: 2019-11-28 | End: 2019-12-01

## 2019-11-27 RX ORDER — ENOXAPARIN SODIUM 100 MG/ML
40 INJECTION SUBCUTANEOUS EVERY 24 HOURS
Status: DISCONTINUED | OUTPATIENT
Start: 2019-11-27 | End: 2019-11-27

## 2019-11-27 RX ORDER — AMLODIPINE BESYLATE 2.5 MG/1
2.5 TABLET ORAL DAILY
Status: DISCONTINUED | OUTPATIENT
Start: 2019-11-28 | End: 2019-11-28

## 2019-11-27 RX ORDER — IPRATROPIUM BROMIDE 0.5 MG/2.5ML
0.5 SOLUTION RESPIRATORY (INHALATION) CONTINUOUS
Status: ACTIVE | OUTPATIENT
Start: 2019-11-27 | End: 2019-11-27

## 2019-11-27 RX ORDER — IBUPROFEN 200 MG
24 TABLET ORAL
Status: DISCONTINUED | OUTPATIENT
Start: 2019-11-27 | End: 2019-12-01 | Stop reason: HOSPADM

## 2019-11-27 RX ORDER — GLUCAGON 1 MG
1 KIT INJECTION
Status: DISCONTINUED | OUTPATIENT
Start: 2019-11-27 | End: 2019-12-01 | Stop reason: HOSPADM

## 2019-11-27 RX ORDER — ASPIRIN 81 MG/1
81 TABLET ORAL DAILY
Status: DISCONTINUED | OUTPATIENT
Start: 2019-11-28 | End: 2019-12-01

## 2019-11-27 RX ORDER — LEVOTHYROXINE SODIUM 25 UG/1
25 TABLET ORAL
Status: DISCONTINUED | OUTPATIENT
Start: 2019-11-28 | End: 2019-12-01 | Stop reason: HOSPADM

## 2019-11-27 RX ORDER — ENOXAPARIN SODIUM 100 MG/ML
40 INJECTION SUBCUTANEOUS EVERY 12 HOURS
Status: DISCONTINUED | OUTPATIENT
Start: 2019-11-27 | End: 2019-11-28

## 2019-11-27 RX ORDER — LEVALBUTEROL 1.25 MG/.5ML
3.75 SOLUTION, CONCENTRATE RESPIRATORY (INHALATION)
Status: COMPLETED | OUTPATIENT
Start: 2019-11-27 | End: 2019-11-27

## 2019-11-27 RX ADMIN — IPRATROPIUM BROMIDE 0.5 MG: 0.5 SOLUTION RESPIRATORY (INHALATION) at 01:11

## 2019-11-27 RX ADMIN — GABAPENTIN 300 MG: 300 CAPSULE ORAL at 09:11

## 2019-11-27 RX ADMIN — ATORVASTATIN CALCIUM 20 MG: 20 TABLET, FILM COATED ORAL at 09:11

## 2019-11-27 RX ADMIN — IPRATROPIUM BROMIDE AND ALBUTEROL SULFATE 3 ML: .5; 3 SOLUTION RESPIRATORY (INHALATION) at 08:11

## 2019-11-27 RX ADMIN — LEVALBUTEROL HYDROCHLORIDE 3.75 MG: 1.25 SOLUTION, CONCENTRATE RESPIRATORY (INHALATION) at 01:11

## 2019-11-27 RX ADMIN — METHYLPREDNISOLONE SODIUM SUCCINATE 40 MG: 40 INJECTION, POWDER, FOR SOLUTION INTRAMUSCULAR; INTRAVENOUS at 09:11

## 2019-11-27 NOTE — PROGRESS NOTES
HPI:    7/25/2017 - Here for follow up, overall doing well, no recent problems with her asthma, reports good compliance and benefit with her CPAP.    Pt asthma is currently stable with no increases in SOB, SOARES or wheezing.  There has been no increase in use of  rescue medications.  Have reviewed medications with pt including the roles of rescue and controlling medications.  All questions answered.  Pt reports no problems with technique with inhalers.  We discussed the possibility of de-escalation of therapy if asthma control remains stable.    ACT - 20    6/20/2019 - Here for follow up, Pt asthma is currently stable with no increases in SOB, SOARES or wheezing.  There has been no increase in use of  rescue medications.  Have reviewed medications with pt including the roles of rescue and controlling medications.  All questions answered.  Pt reports no problems with technique with inhalers.  We discussed the possibility of de-escalation of therapy if asthma control remains stable.  Has not been in hospital since the last time I saw her.  Going on a cruise in August.  Has gained about 21# (d/w her)     ACT - 20    11/27/2019 - Over last 3 weeks has had progressive SOB and having trouble with desaturations while awake.  Came to office for evaluation and sats were 79% on RA, no fever, chills, sweats, has not had much cough or congestion.  She reports that she has been using her CPAP regularly but still has somnolence.  SOB much worse with minimal exertion.  No chest pain of any type.  No increase in fluid retention or change in weight, some swelling of hands.    CPAP Therapy    CPAP therapy - 10    Date of sleep study - na RDI - na    Pt reports good compliance and benefit with the therapy.    Face to face visit with pt concerning their CPAP therapy.  I have stressed continued compliance with the treatment and all questions were answered.    Home Oxygen 2 - 3 LPM uses qhs and prn    Face to face visit with pt regarding  their home oxygen.  We have discussed the need for oxygen including continuous use, prn use or night time use (as appropriate for the pt).  We discussed the need for compliance with the therapy. We will do recertifications as necessary.     Nebulizer    Face to face visit regarding home nebulizer use.    I have discussed with the pt regarding the need for home nebulizer therapy.  I have stressed the need for compliance with the therapy and the need to let me know if there are any issues with the medication.  All questions answered.      Medications    Current Outpatient Medications:     albuterol (PROAIR HFA) 90 mcg/actuation inhaler, Inhale 2 puffs into the lungs every 6 (six) hours as needed for Wheezing. Rescue, Disp: 18 g, Rfl: 11    amlodipine (NORVASC) 2.5 MG tablet, TK 1 T PO BID, Disp: , Rfl: 3    aspirin (ECOTRIN) 81 MG EC tablet, Take 81 mg by mouth once daily., Disp: , Rfl:     atorvastatin (LIPITOR) 20 MG tablet, TK 1 T PO QPM, Disp: , Rfl: 3    BASAGLAR KWIKPEN U-100 INSULIN glargine 100 units/mL (3mL) SubQ pen, INJECT SUBCUTANEOUS 35 UNITS EVERY MORNING, Disp: , Rfl: 7    BD INSULIN SYRINGE ULTRA-FINE 0.5 mL 31 gauge x 5/16 Syrg, USE TO INJECT 4X DAILY, Disp: , Rfl: 1    blood sugar diagnostic (FREESTYLE LITE STRIPS MISC), FreeStyle Lite Strips  USE UTD QID, Disp: , Rfl:     ergocalciferol (ERGOCALCIFEROL) 50,000 unit Cap, ergocalciferol (vitamin D2) 50,000 unit capsule, Disp: , Rfl:     fluticasone-salmeterol diskus inhaler 250-50 mcg, INHALE ONE PUFF INTO THE LUNGS TWICE DAILY, Disp: 1 each, Rfl: 11    FREESTYLE INSULINX TEST STRIPS Strp, TEST BLOOD SUGAR QID, Disp: , Rfl: 3    FREESTYLE LANCETS 28 gauge lancets, TEST QID, Disp: , Rfl: 3    gabapentin (NEURONTIN) 300 MG capsule, TAKE ONE CAPSULE BY MOUTH IN THE MORNING, ONE CAPSULE AT NOON, AND 2 CAPSULES BEFORE BED, Disp: , Rfl: 6    glimepiride (AMARYL) 4 MG tablet, TK 1 T PO D, Disp: , Rfl: 3    hyaluronate sod, cross-linked  "(GEL-ONE) 30 mg/3 mL Syrg, 3 mLs., Disp: , Rfl:     hydrochlorothiazide (HYDRODIURIL) 25 MG tablet, TK 1 T PO D, Disp: , Rfl: 3    hylan g-f 20 (SYNVISC-ONE) 48 mg/6 mL Syrg, 6 mLs., Disp: , Rfl:     insulin (BASAGLAR KWIKPEN U-100 INSULIN) glargine 100 units/mL (3mL) SubQ pen, Basaglar KwikPen U-100 Insulin 100 unit/mL (3 mL) subcutaneous 35units, Disp: , Rfl:     insulin aspart U-100 (NOVOLOG FLEXPEN U-100 INSULIN) 100 unit/mL InPn pen, Novolog Flexpen U-100 Insulin aspart 100 unit/mL subcutaneous  INJECT 15 UNITS SUBCUTANEOUS THREE TIMES A DAY, Disp: , Rfl:     insulin syringe-needle U-100 (BD INSULIN SYRINGE ULTRA-FINE) 0.5 mL 31 gauge x 5/16" Syrg, BD Insulin Syringe Ultra-Fine 0.5 mL 31 gauge x 5/16", Disp: , Rfl:     irbesartan (AVAPRO) 300 MG tablet, TK 1 T PO D, Disp: , Rfl: 3    lancets (FREESTYLE LANCETS) 28 gauge Misc, FreeStyle Lancets 28 gauge, Disp: , Rfl:     levalbuterol (XOPENEX) 0.63 mg/3 mL nebulizer solution, USE 1 VIAL VIA NEBULIZER THREE TIMES DAILY AS NEEDED FOR WHEEZING, Disp: 75 mL, Rfl: 11    levothyroxine (SYNTHROID) 25 MCG tablet, TK 1 T PO D, Disp: , Rfl: 3    liraglutide 0.6 mg/0.1 mL, 18 mg/3 mL, subq PNIJ (VICTOZA 3-KAM) 0.6 mg/0.1 mL (18 mg/3 mL) PnIj, Victoza 3-Kam 0.6 mg/0.1 mL (18 mg/3 mL) subcutaneous pen injector, Disp: , Rfl:     meloxicam (MOBIC) 15 MG tablet, meloxicam 15 mg tablet, Disp: , Rfl:     metformin (GLUCOPHAGE) 500 MG tablet, TK 1 T PO BID, Disp: , Rfl: 3    nystatin-triamcinolone (MYCOLOG II) cream, Apply to affected area 2 times daily, Disp: 30 g, Rfl: 1    omeprazole (PRILOSEC) 20 MG capsule, TK 1 C PO D PRN, Disp: , Rfl: 1    pen needle, diabetic (BD ULTRA-FINE SUSHANT PEN NEEDLE) 32 gauge x 5/32" Ndle, BD Ultra-Fine Sushant Pen Needle 32 gauge x 5/32", Disp: , Rfl:     sodium hyaluronate, orthovisc, (ORTHOVISC) 30 mg/2 mL Syrg, ORTHOVISC 30 mg/2 mL intra-articular syringe, Disp: , Rfl:     tizanidine (ZANAFLEX) 2 MG tablet, TK 1 T PO Q 8 H PRN, " Disp: , Rfl: 0    azithromycin (ZITHROMAX Z-KAM) 250 MG tablet, Take 2 po today then 1 a day for 4 days (Patient not taking: Reported on 11/27/2019), Disp: 6 tablet, Rfl: 0    benzonatate (TESSALON) 100 MG capsule, benzonatate 100 mg capsule, Disp: , Rfl:     cyclobenzaprine (FLEXERIL) 10 MG tablet, cyclobenzaprine 10 mg tablet  TK 1 T PO TID PRF MUSCLE SPASMS, Disp: , Rfl:     diazePAM (VALIUM) 5 MG tablet, diazepam 5 mg tablet, Disp: , Rfl:     doxycycline (VIBRA-TABS) 100 MG tablet, doxycycline hyclate 100 mg tablet, Disp: , Rfl:     Allergies  Review of patient's allergies indicates:   Allergen Reactions    Benadryl allergy decongestant     Codeine        Social History    Social History     Tobacco Use   Smoking Status Never Smoker   Smokeless Tobacco Never Used     ETOH - 1-2 drinks per week.  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   HENT: Negative for congestion.    Eyes: Negative for pain.   Respiratory: Negative for cough, hemoptysis, sputum production, shortness of breath, wheezing and stridor.    Cardiovascular: Negative for chest pain, palpitations, orthopnea, claudication, leg swelling and PND.   Gastrointestinal: Negative for abdominal pain, constipation, diarrhea, heartburn, nausea and vomiting.   Genitourinary: Negative for dysuria, frequency and urgency.   Musculoskeletal: Negative for falls and myalgias.   Neurological: Negative for sensory change, focal weakness and weakness.   Psychiatric/Behavioral: Negative for depression. The patient is not nervous/anxious.        Physical Exam    Vitals:    11/27/19 1125 11/27/19 1130  "  BP: 126/80    Pulse: 99    SpO2: (!) 79% (!) 87%   Weight: 123.4 kg (272 lb)    Height: 5' 2" (1.575 m)        Physical Exam   Constitutional: She is oriented to person, place, and time. She appears well-developed and well-nourished. No distress.   obese   HENT:   Head: Normocephalic and atraumatic.   Right Ear: External ear normal.   Left Ear: External ear normal.   Nose: Nose normal.   Mouth/Throat: Oropharynx is clear and moist.   Eyes: Pupils are equal, round, and reactive to light. Conjunctivae and EOM are normal.   Neck: Normal range of motion. Neck supple. No JVD present. No tracheal deviation present. No thyromegaly present.   Cardiovascular: Normal rate, regular rhythm and intact distal pulses. Exam reveals no gallop and no friction rub.   Murmur heard.  2/6 REMIGIO   Pulmonary/Chest: Effort normal and breath sounds normal. No stridor. No respiratory distress. She has no wheezes. She has no rales. She exhibits no tenderness.   Abdominal: Soft. Bowel sounds are normal. She exhibits no distension. There is no tenderness.   Musculoskeletal: Normal range of motion. She exhibits no edema or tenderness.   Lymphadenopathy:     She has no cervical adenopathy.   Neurological: She is alert and oriented to person, place, and time. No cranial nerve deficit.   Skin: Skin is warm and dry. She is not diaphoretic.   Psychiatric: She has a normal mood and affect. Her behavior is normal.   Nursing note and vitals reviewed.      Lab      Xray      Impression/Plan  Problem List Items Addressed This Visit        Neuro    Obstructive sleep apnea syndrome  - stable with present therapy       Pulmonary    Chronic respiratory failure with hypoxia  - worse, will send to ER for evaluation and probable admission  - oxygenation is much worse        Mild persistent asthma without complication  - as above will refer to ER    DM  - she will discuss with her MD                   Fluids/Electrolytes/Nutrition/GI    Morbid obesity  - Discussed " with pt at length about the need to work on diet and weight loss.  Have offered suggestions on approaches for this problem.  Also discussed the need to start a regular exercise program.  We discussed at length the importance of regular exercise and working at getting to a healthier weight.  All questions answered.  Pt voices understanding of these principles and hopefully will take action.       Other    Never smoker      Other Visit Diagnoses    None.       Percy Guzmán MD

## 2019-11-27 NOTE — SUBJECTIVE & OBJECTIVE
Past Medical History:   Diagnosis Date    Acute on chronic respiratory failure 2019    Arthritis     Asthma     Chronic bronchitis     Diabetes mellitus     Hyperlipidemia     Hypertension     Pain in joint of left shoulder region 2018    Sleep apnea     Thyroid disease        Past Surgical History:   Procedure Laterality Date    APPENDECTOMY       SECTION      x3    HYSTERECTOMY      OOPHORECTOMY Bilateral     age 42       Review of patient's allergies indicates:   Allergen Reactions    Benadryl allergy decongestant     Codeine        Family History     Problem Relation (Age of Onset)    Pancreatic cancer Mother        Tobacco Use    Smoking status: Never Smoker    Smokeless tobacco: Never Used   Substance and Sexual Activity    Alcohol use: Yes     Comment: occasional    Drug use: No    Sexual activity: Never     Partners: Male     Birth control/protection: See Surgical Hx         Review of Systems   Constitutional: Positive for activity change and fatigue. Negative for appetite change, chills and fever.   HENT: Negative for congestion, hearing loss, nosebleeds, postnasal drip, sneezing and sore throat.    Respiratory: Positive for shortness of breath and wheezing. Negative for apnea, cough, choking, chest tightness and stridor.    Cardiovascular: Positive for leg swelling (chronic and reports not worse). Negative for chest pain and palpitations.   Gastrointestinal: Negative for abdominal distention, abdominal pain, blood in stool, constipation, diarrhea and nausea.   Genitourinary: Negative for dysuria, frequency, hematuria and urgency.   Musculoskeletal: Negative for arthralgias and myalgias.   Neurological: Positive for weakness. Negative for dizziness, tremors, seizures, syncope, facial asymmetry, speech difficulty, light-headedness, numbness and headaches.   Hematological: Negative for adenopathy.   Psychiatric/Behavioral: Positive for confusion (not at her baseline) and  sleep disturbance (increased somnolence). Negative for dysphoric mood. The patient is not nervous/anxious.      Objective:     Vital Signs (Most Recent):  Temp: 98.2 °F (36.8 °C) (11/27/19 1211)  Pulse: 87 (11/27/19 1406)  Resp: (!) 26 (11/27/19 1406)  BP: (!) 141/63 (11/27/19 1403)  SpO2: (!) 93 % (11/27/19 1406) Vital Signs (24h Range):  Temp:  [98.2 °F (36.8 °C)] 98.2 °F (36.8 °C)  Pulse:  [83-99] 87  Resp:  [18-26] 26  SpO2:  [79 %-98 %] 93 %  BP: (126-161)/(63-80) 141/63     Weight: 117.9 kg (260 lb)  Body mass index is 47.55 kg/m².    No intake or output data in the 24 hours ending 11/27/19 1552    Physical Exam   Constitutional: She is oriented to person, place, and time. No distress.   Morbidly obese female, nad, aao x 3   HENT:   Head: Normocephalic and atraumatic.   Nose: Nose normal.   Mouth/Throat: Oropharynx is clear and moist.   Poor dentition   Eyes: Pupils are equal, round, and reactive to light. Conjunctivae and EOM are normal.   Neck: Normal range of motion. Neck supple. No JVD present. No tracheal deviation present. No thyromegaly present.   Cardiovascular: Normal rate, regular rhythm, normal heart sounds and intact distal pulses. Exam reveals no gallop and no friction rub.   No murmur heard.  Pulmonary/Chest: Effort normal and breath sounds normal. No stridor. No respiratory distress. She has no wheezes. She has no rales. She exhibits no tenderness.   Decreased BS in general  No acc m use   Abdominal: Soft. Bowel sounds are normal. She exhibits no distension. There is no tenderness. There is no rebound and no guarding.   obese   Musculoskeletal: Normal range of motion. She exhibits edema (+ compression stockings). She exhibits no tenderness.   Lymphadenopathy:     She has no cervical adenopathy.   Neurological: She is alert and oriented to person, place, and time. No cranial nerve deficit.   Generalized weakness  Mentation seems slowed from baseline   Skin: Skin is warm and dry. She is not  diaphoretic.   [place] dry   Psychiatric: She has a normal mood and affect. Her behavior is normal.   Nursing note and vitals reviewed.      Vents:       Lines/Drains/Airways     Peripheral Intravenous Line                 Peripheral IV - Single Lumen 11/27/19 1238 20 G Right Antecubital less than 1 day                Significant Labs:    CBC/Anemia Profile:  Recent Labs   Lab 11/27/19  1238   WBC 8.99   HGB 10.9*   HCT 38.1      MCV 82   RDW 16.7*        Chemistries:  Recent Labs   Lab 11/27/19  1238      K 4.3   CL 97   CO2 32*   BUN 31*   CREATININE 1.1   CALCIUM 9.0   ALBUMIN 3.8   PROT 7.6   BILITOT 0.9   ALKPHOS 79   ALT 12   AST 13   MG 1.9       ABGs:   Recent Labs   Lab 11/27/19  1524   PH 7.364   PCO2 62.4*   HCO3 35.6*   POCSATURATED 88*   BE 10     All pertinent labs within the past 24 hours have been reviewed.    Significant Imaging:   I have reviewed and interpreted all pertinent imaging results/findings within the past 24 hours.     FINDINGS:  The heart is enlarged.  The mediastinum is unremarkable for AP portable technique.  Pulmonary vasculature is prominent, similar to the prior study, with no evidence of juan miguel congestive failure.  No pleural effusions are demonstrated.  Osseous structures are unremarkable.      Impression       1. Cardiomegaly with prominence of the pulmonary vasculature but no evidence of juan miguel congestive failure.  2. No other significant findings.      Electronically signed by: Gerald Potter MD  Date: 11/27/2019  Time: 12:49     EKG  Vent. Rate : 126 BPM     Atrial Rate : 131 BPM     P-R Int : 000 ms          QRS Dur : 076 ms      QT Int : 330 ms       P-R-T Axes : 000 036 001 degrees     QTc Int : 477 ms    Atrial fibrillation with rapid ventricular response  Low voltage QRS  Nonspecific ST abnormality  Abnormal ECG  When compared with ECG of 27-NOV-2019 12:33,  No significant change was found  (no S1, + Q3, no T3)

## 2019-11-27 NOTE — ED PROVIDER NOTES
Encounter Date: 2019       History     Chief Complaint   Patient presents with    Shortness of Breath     sent from doctor office for low pulse ox     59-year-old female with history of hypertension, hyperlipidemia, asthma, obstructive sleep apnea , COPD on home oxygen therapy.  Patient presents from Dr. Guzmán's office with complaint of suspected COPD exacerbation with hypercapnic respiratory failure.  Patient has had a 3 week history of shortness of breath and wheezing nonproductive cough.  Patient has been seen and followed by her pulmonologist and today in the office patient was found to be more somnolent with persistent hypoventilatory state.  Patient was sent to the emergency department for further evaluation and treatment.        Review of patient's allergies indicates:   Allergen Reactions    Benadryl allergy decongestant     Codeine      Past Medical History:   Diagnosis Date    Acute on chronic respiratory failure 2019    Arthritis     Asthma     Chronic bronchitis     Diabetes mellitus     Hyperlipidemia     Hypertension     Pain in joint of left shoulder region 2018    Sleep apnea     Thyroid disease      Past Surgical History:   Procedure Laterality Date    APPENDECTOMY       SECTION      x3    HYSTERECTOMY      OOPHORECTOMY Bilateral     age 42     Family History   Problem Relation Age of Onset    Pancreatic cancer Mother     Breast cancer Neg Hx     Colon cancer Neg Hx     Ovarian cancer Neg Hx      Social History     Tobacco Use    Smoking status: Never Smoker    Smokeless tobacco: Never Used   Substance Use Topics    Alcohol use: Yes     Comment: occasional    Drug use: No     Review of Systems   Constitutional: Negative for chills, fatigue and fever.   HENT: Positive for congestion. Negative for postnasal drip, rhinorrhea, sinus pain and trouble swallowing.    Eyes: Negative for photophobia and visual disturbance.   Respiratory: Positive for cough,  shortness of breath and wheezing. Negative for chest tightness.    Cardiovascular: Negative for chest pain, palpitations and leg swelling.   Gastrointestinal: Negative for abdominal pain, blood in stool, nausea and vomiting.   Endocrine: Negative for polydipsia, polyphagia and polyuria.   Genitourinary: Negative for dysuria, flank pain, urgency, vaginal bleeding and vaginal discharge.   Musculoskeletal: Negative for back pain and gait problem.   Skin: Negative for rash.   Neurological: Negative for tremors, weakness and numbness.   Hematological: Does not bruise/bleed easily.   Psychiatric/Behavioral: Negative for confusion.   All other systems reviewed and are negative.      Physical Exam     Initial Vitals [11/27/19 1211]   BP Pulse Resp Temp SpO2   (!) 161/73 88 (!) 24 98.2 °F (36.8 °C) (!) 83 %      MAP       --         Physical Exam    Nursing note and vitals reviewed.  Constitutional: She appears well-developed and well-nourished.   HENT:   Head: Normocephalic and atraumatic.   Nose: Nose normal.   Mouth/Throat: Oropharynx is clear and moist.   Eyes: Conjunctivae and EOM are normal. Pupils are equal, round, and reactive to light.   Neck: Normal range of motion. Neck supple. No thyromegaly present. No tracheal deviation present.   Cardiovascular: Normal rate, regular rhythm, normal heart sounds and intact distal pulses. Exam reveals no gallop and no friction rub.    No murmur heard.  Pulmonary/Chest: No stridor. She is in respiratory distress.   Diminished breath sounds diffuse in nature with bibasilar crackles noted.   Abdominal: Soft. Bowel sounds are normal. She exhibits no mass. There is no rebound and no guarding.   Musculoskeletal: Normal range of motion. She exhibits no edema.   Lymphadenopathy:     She has no cervical adenopathy.   Neurological: She is alert and oriented to person, place, and time. She has normal strength and normal reflexes. GCS score is 15. GCS eye subscore is 4. GCS verbal subscore  is 5. GCS motor subscore is 6.   Skin: Skin is warm and dry. Capillary refill takes less than 2 seconds.   Psychiatric: She has a normal mood and affect.         ED Course   Procedures  Labs Reviewed   CBC W/ AUTO DIFFERENTIAL - Abnormal; Notable for the following components:       Result Value    Hemoglobin 10.9 (*)     Mean Corpuscular Hemoglobin 23.3 (*)     Mean Corpuscular Hemoglobin Conc 28.6 (*)     RDW 16.7 (*)     Immature Granulocytes 0.9 (*)     Immature Grans (Abs) 0.08 (*)     Gran% 78.6 (*)     Lymph% 14.1 (*)     Mono% 3.9 (*)     All other components within normal limits   COMPREHENSIVE METABOLIC PANEL - Abnormal; Notable for the following components:    CO2 32 (*)     Glucose 224 (*)     BUN, Bld 31 (*)     eGFR if non  55.9 (*)     All other components within normal limits   ISTAT PROCEDURE - Abnormal; Notable for the following components:    POC PH 7.315 (*)     POC PCO2 71.0 (*)     POC PO2 33 (*)     POC HCO3 36.2 (*)     POC SATURATED O2 55 (*)     POC TCO2 38 (*)     All other components within normal limits   TROPONIN I   B-TYPE NATRIURETIC PEPTIDE   MAGNESIUM   TROPONIN I        ECG Results          EKG 12-lead (In process)  Result time 11/27/19 14:15:48    In process by Interface, Lab In [facility] (11/27/19 14:15:48)                 Narrative:    Test Reason : R07.9,    Vent. Rate : 126 BPM     Atrial Rate : 131 BPM     P-R Int : 000 ms          QRS Dur : 076 ms      QT Int : 330 ms       P-R-T Axes : 000 036 001 degrees     QTc Int : 477 ms    Atrial fibrillation with rapid ventricular response  Low voltage QRS  Nonspecific ST abnormality  Abnormal ECG  When compared with ECG of 27-NOV-2019 12:33,  No significant change was found    Referred By: [email or web address]   SELF           Confirmed By:                             Imaging Results          X-Ray Chest AP Portable (Final result)  Result time 11/27/19 12:49:35    Final result by Gerald Potter MD (11/27/19 12:49:35)                  Impression:      1. Cardiomegaly with prominence of the pulmonary vasculature but no evidence of juan miguel congestive failure.  2. No other significant findings.      Electronically signed by: Gerald Potter MD  Date:    11/27/2019  Time:    12:49             Narrative:    EXAMINATION:  XR CHEST AP PORTABLE    CLINICAL HISTORY:  Chest pain    COMPARISON:  August 2019    FINDINGS:  The heart is enlarged.  The mediastinum is unremarkable for AP portable technique.  Pulmonary vasculature is prominent, similar to the prior study, with no evidence of juan miguel congestive failure.  No pleural effusions are demonstrated.  Osseous structures are unremarkable.                                 Medical Decision Making:   Initial Assessment:   59-year-old female with history of asthma, COPD on home oxygen therapy here with acute hypoxic hypercapnic respiratory distress  Differential Diagnosis:   COPD exacerbation, pneumonia, pulmonary edema, pleural effusion  Clinical Tests:   Lab Tests: Ordered and Reviewed  Radiological Study: Ordered and Reviewed  Medical Tests: Ordered and Reviewed  ED Management:  Patient evaluated workup in emergency department and was found to have acute hypoxic respiratory failure on ABG with initial ABG showing pH 7.3, CO2 71, PO2 33.  Case was discussed with pulmonologist .  At this time patient will be placed on continuous nebulized therapy and BiPAP therapy.  Patient will be admitted to the intensive care unit will be closely monitored for COPD exacerbation with hypoxic respiratory hypercapnia.   Patient repeat examination emergency department patient appears to be tolerating BiPAP fine with improvement in overall work of breathing and respiratory status.              Attending Attestation:         Attending Critical Care:   Critical Care Times:   Direct Patient Care (initial evaluation, reassessments, and time considering the  case)................................................................30 minutes.   Additional History from reviewing old medical records or taking additional history from the family, EMS, PCP, etc.......................10 minutes.   Ordering, Reviewing, and Interpreting Diagnostic Studies...............................................................................................................10 minutes.   Documentation..................................................................................................................................................................................10 minutes.   Consultation with other Physicians. .................................................................................................................................................10 minutes.   ==============================================================  · Total Critical Care Time - exclusive of procedural time: 70 minutes.  ==============================================================  Critical care was necessary to treat or prevent imminent or life-threatening deterioration of the following conditions: COPD exacerbation.   Critical care was time spent personally by me on the following activities: obtaining history from patient or relative, examination of patient, review of x-rays / CT sent with the patient, review of old charts, ordering lab, x-rays, and/or EKG, development of treatment plan with patient or relative, ordering and performing treatments and interventions, evaluation of patient's response to treatment, discussions with primary provider, discussion with consultants, interpretation of cardiac measurements and re-evaluation of patient's conition.   Critical Care Condition: potentially life-threatening                             Clinical Impression:       ICD-10-CM ICD-9-CM   1. COPD exacerbation J44.1 491.21   2. Hypoxia R09.02 799.02   3. Acute on chronic respiratory failure with hypoxia  and hypercapnia J96.21 518.84    J96.22 786.09     799.02                             Cristo Hernandez MD  11/27/19 1433

## 2019-11-27 NOTE — HPI
"59 year old female presented to ED from her Pulmonologist's office for acute on chronic respiratory failure. She was hypercapnic with hypoxemia. Her ABG (personally reviewed) upon presentation 7.36/62/59/35. Patient placed on BiPAP. Patient denies any anginal chest discomfort. She is chronically SOB. Wears CPAP at home. Denies orthopnea/PND. CXR personally reviewed shows cardiomegaly, but no infiltrate. EKG (personally reviewed) showed a fib with RVR. However the patient converted to sinus rhythm and stated that she has never had "an irregular heart beat". Patient personally discussed with ED physician, who stated that he had discussed the patient with her Pulmonologist and that ICU admission for titration of BiPAP was warrented  "

## 2019-11-27 NOTE — CONSULTS
[facility]  Pulmonology  Consult Note    Patient Name: Liset Bailon  MRN: [id number]  Admission Date: 2019  Hospital Length of Stay: 0 days  Code Status: Prior  Attending Physician: Cristo Hernandez MD  Primary Care Provider: Primary Doctor No   Principal Problem: <principal problem not specified>    Inpatient consult to Pulmonology  Consult performed by: Percy Guzmán MD  Consult ordered by: Cristo Hernandez MD        Subjective:     HPI:  58 yo female known to me, has h/o chronic hypoxemic respiratory failure (uses O2 3 LPM QHS and prn), sleep apnea (on CPAP - 10 at home), mild asthma last seen 2019 and was doing well.  Presented to my office today with complaints of not breathing well for the last 2-3 weeks, + increased dyspnea (now with minimal exertion), desaturations (reports sats in low 80's at home - last office sat was 93% on RA at rest).  In office today had initial sta 79% and increased to 90's on 3 LPM.  She also complained of increased somnolence.   has concerns that CPAP may not be working correctly and reports that filters wer very dirt (has ordered replacements).  Her mentation did not seem to be at her baseline and she was dyspneic with minima; exertion and was sent to ER for evaluation.  Case was d/w Dr Mast by phone.  In ER EKG showed atrial fibrillation with RVR rate at 126.    Past Medical History:   Diagnosis Date    Acute on chronic respiratory failure 2019    Arthritis     Asthma     Chronic bronchitis     Diabetes mellitus     Hyperlipidemia     Hypertension     Pain in joint of left shoulder region 2018    Sleep apnea     Thyroid disease        Past Surgical History:   Procedure Laterality Date    APPENDECTOMY       SECTION      x3    HYSTERECTOMY      OOPHORECTOMY Bilateral     age 42       Review of patient's allergies indicates:   Allergen Reactions    Benadryl allergy decongestant     Codeine        Family History      Problem Relation (Age of Onset)    Pancreatic cancer Mother        Tobacco Use    Smoking status: Never Smoker    Smokeless tobacco: Never Used   Substance and Sexual Activity    Alcohol use: Yes     Comment: occasional    Drug use: No    Sexual activity: Never     Partners: Male     Birth control/protection: See Surgical Hx         Review of Systems   Constitutional: Positive for activity change and fatigue. Negative for appetite change, chills and fever.   HENT: Negative for congestion, hearing loss, nosebleeds, postnasal drip, sneezing and sore throat.    Respiratory: Positive for shortness of breath and wheezing. Negative for apnea, cough, choking, chest tightness and stridor.    Cardiovascular: Positive for leg swelling (chronic and reports not worse). Negative for chest pain and palpitations.   Gastrointestinal: Negative for abdominal distention, abdominal pain, blood in stool, constipation, diarrhea and nausea.   Genitourinary: Negative for dysuria, frequency, hematuria and urgency.   Musculoskeletal: Negative for arthralgias and myalgias.   Neurological: Positive for weakness. Negative for dizziness, tremors, seizures, syncope, facial asymmetry, speech difficulty, light-headedness, numbness and headaches.   Hematological: Negative for adenopathy.   Psychiatric/Behavioral: Positive for confusion (not at her baseline) and sleep disturbance (increased somnolence). Negative for dysphoric mood. The patient is not nervous/anxious.      Objective:     Vital Signs (Most Recent):  Temp: 98.2 °F (36.8 °C) (11/27/19 1211)  Pulse: 87 (11/27/19 1406)  Resp: (!) 26 (11/27/19 1406)  BP: (!) 141/63 (11/27/19 1403)  SpO2: (!) 93 % (11/27/19 1406) Vital Signs (24h Range):  Temp:  [98.2 °F (36.8 °C)] 98.2 °F (36.8 °C)  Pulse:  [83-99] 87  Resp:  [18-26] 26  SpO2:  [79 %-98 %] 93 %  BP: (126-161)/(63-80) 141/63     Weight: 117.9 kg (260 lb)  Body mass index is 47.55 kg/m².    No intake or output data in the 24 hours  ending 11/27/19 1552    Physical Exam   Constitutional: She is oriented to person, place, and time. No distress.   Morbidly obese female, nad, aao x 3   HENT:   Head: Normocephalic and atraumatic.   Nose: Nose normal.   Mouth/Throat: Oropharynx is clear and moist.   Poor dentition   Eyes: Pupils are equal, round, and reactive to light. Conjunctivae and EOM are normal.   Neck: Normal range of motion. Neck supple. No JVD present. No tracheal deviation present. No thyromegaly present.   Cardiovascular: Normal rate, regular rhythm, normal heart sounds and intact distal pulses. Exam reveals no gallop and no friction rub.   No murmur heard.  Pulmonary/Chest: Effort normal and breath sounds normal. No stridor. No respiratory distress. She has no wheezes. She has no rales. She exhibits no tenderness.   Decreased BS in general  No acc m use   Abdominal: Soft. Bowel sounds are normal. She exhibits no distension. There is no tenderness. There is no rebound and no guarding.   obese   Musculoskeletal: Normal range of motion. She exhibits edema (+ compression stockings). She exhibits no tenderness.   Lymphadenopathy:     She has no cervical adenopathy.   Neurological: She is alert and oriented to person, place, and time. No cranial nerve deficit.   Generalized weakness  Mentation seems slowed from baseline   Skin: Skin is warm and dry. She is not diaphoretic.   [place] dry   Psychiatric: She has a normal mood and affect. Her behavior is normal.   Nursing note and vitals reviewed.      Vents:       Lines/Drains/Airways     Peripheral Intravenous Line                 Peripheral IV - Single Lumen 11/27/19 1238 20 G Right Antecubital less than 1 day                Significant Labs:    CBC/Anemia Profile:  Recent Labs   Lab 11/27/19  1238   WBC 8.99   HGB 10.9*   HCT 38.1      MCV 82   RDW 16.7*        Chemistries:  Recent Labs   Lab 11/27/19  1238      K 4.3   CL 97   CO2 32*   BUN 31*   CREATININE 1.1   CALCIUM 9.0    ALBUMIN 3.8   PROT 7.6   BILITOT 0.9   ALKPHOS 79   ALT 12   AST 13   MG 1.9       ABGs:   Recent Labs   Lab 11/27/19  1524   PH 7.364   PCO2 62.4*   HCO3 35.6*   POCSATURATED 88*   BE 10     All pertinent labs within the past 24 hours have been reviewed.    Significant Imaging:   I have reviewed and interpreted all pertinent imaging results/findings within the past 24 hours.     FINDINGS:  The heart is enlarged.  The mediastinum is unremarkable for AP portable technique.  Pulmonary vasculature is prominent, similar to the prior study, with no evidence of juan miguel congestive failure.  No pleural effusions are demonstrated.  Osseous structures are unremarkable.      Impression       1. Cardiomegaly with prominence of the pulmonary vasculature but no evidence of juan miguel congestive failure.  2. No other significant findings.      Electronically signed by: Gerald Potter MD  Date: 11/27/2019  Time: 12:49     EKG  Vent. Rate : 126 BPM     Atrial Rate : 131 BPM     P-R Int : 000 ms          QRS Dur : 076 ms      QT Int : 330 ms       P-R-T Axes : 000 036 001 degrees     QTc Int : 477 ms    Atrial fibrillation with rapid ventricular response  Low voltage QRS  Nonspecific ST abnormality  Abnormal ECG  When compared with ECG of 27-NOV-2019 12:33,  No significant change was found  (no S1, + Q3, no T3)      Assessment/Plan:     Acute on chronic respiratory failure with hypoxia and hypercapnia  · Pt not at her baseline  · Respiratory treatments  · O2 and wean as able  · Check BNP  · Rate control of atrial fibrillation  · CPAP when sleeping    Mild persistent asthma without complication  · Does not seem to have an acute exacerbation at this time    Obstructive sleep apnea syndrome  · Has been compliant with CPAP at home  · Told pt to bring her own machine to hospital    Dyspnea  · ? If this is related to her atrial fibrillation  · As above    Atrial fibrillation  · Rate control and would ask cardiology to see pt  · Doubt ACS  here    Respiratory failure with hypoxia and hypercapnia  · Does not seem to have acute exacerbation at this time    Morbid obesity  · Needs to lose weight          Thank you for your consult. I will follow-up with patient. Please contact us if you have any additional questions.     Percy Guzmán MD  Pulmonology  [facility]

## 2019-11-27 NOTE — PLAN OF CARE
Results for MILLER HOPPER (MRN [id number]) as of 11/27/2019 13:40   Ref. Range 11/27/2019 13:40   POC PH Latest Ref Range: 7.35 - 7.45  7.315 (L)   POC PCO2 Latest Ref Range: 35 - 45 mmHg 71.0 (H)   POC PO2 Latest Ref Range: 40 - 60 mmHg 33 (LL)   POC BE Latest Ref Range: -2 to 2 mmol/L 10   POC HCO3 Latest Ref Range: 24 - 28 mmol/L 36.2 (H)   POC SATURATED O2 Latest Ref Range: 95 - 100 % 55 (L)   POC TCO2 Latest Ref Range: 24 - 29 mmol/L 38 (H)   Flow Unknown 3   Sample Unknown VENOUS   DelSys Unknown Nasal Can   Allens Test Unknown Pass   Site Unknown LR   Mode Unknown SPONT   Blood gas results reported as venous to Dr. Hernandez. Pt did not tolerate ABG stick well. Will place pt on Bipap per Dr. Hernandez

## 2019-11-27 NOTE — SUBJECTIVE & OBJECTIVE
Past Medical History:   Diagnosis Date    Acute on chronic respiratory failure 2019    Arthritis     Asthma     Chronic bronchitis     Diabetes mellitus     Hyperlipidemia     Hypertension     Pain in joint of left shoulder region 2018    Sleep apnea     Thyroid disease        Past Surgical History:   Procedure Laterality Date    APPENDECTOMY       SECTION      x3    HYSTERECTOMY      OOPHORECTOMY Bilateral     age 42       Review of patient's allergies indicates:   Allergen Reactions    Benadryl allergy decongestant     Codeine        No current facility-administered medications on file prior to encounter.      Current Outpatient Medications on File Prior to Encounter   Medication Sig    albuterol (PROAIR HFA) 90 mcg/actuation inhaler Inhale 2 puffs into the lungs every 6 (six) hours as needed for Wheezing. Rescue    amlodipine (NORVASC) 2.5 MG tablet TK 1 T PO BID    aspirin (ECOTRIN) 81 MG EC tablet Take 81 mg by mouth once daily.    atorvastatin (LIPITOR) 20 MG tablet TK 1 T PO QPM    azithromycin (ZITHROMAX Z-KAM) 250 MG tablet Take 2 po today then 1 a day for 4 days    BASAGLAR KWIKPEN U-100 INSULIN glargine 100 units/mL (3mL) SubQ pen INJECT SUBCUTANEOUS 35 UNITS EVERY MORNING    fluticasone-salmeterol diskus inhaler 250-50 mcg INHALE ONE PUFF INTO THE LUNGS TWICE DAILY    gabapentin (NEURONTIN) 300 MG capsule TAKE ONE CAPSULE BY MOUTH IN THE MORNING, ONE CAPSULE AT NOON, AND 2 CAPSULES BEFORE BED    glimepiride (AMARYL) 4 MG tablet Take 4 mg by mouth 2 (two) times daily.     hydrochlorothiazide (HYDRODIURIL) 25 MG tablet TK 1 T PO D    insulin aspart U-100 (NOVOLOG FLEXPEN U-100 INSULIN) 100 unit/mL InPn pen Novolog Flexpen U-100 Insulin aspart 100 unit/mL subcutaneous   INJECT 15 UNITS SUBCUTANEOUS THREE TIMES A DAY    irbesartan (AVAPRO) 300 MG tablet TK 1 T PO D    levalbuterol (XOPENEX) 0.63 mg/3 mL nebulizer solution USE 1 VIAL VIA NEBULIZER THREE TIMES  "DAILY AS NEEDED FOR WHEEZING    levothyroxine (SYNTHROID) 25 MCG tablet TK 1 T PO D    liraglutide 0.6 mg/0.1 mL, 18 mg/3 mL, subq PNIJ (VICTOZA 3-KAM) 0.6 mg/0.1 mL (18 mg/3 mL) PnIj Victoza 3-Kam 0.6 mg/0.1 mL (18 mg/3 mL) subcutaneous pen injector    metformin (GLUCOPHAGE) 500 MG tablet TK 1 T PO BID    omeprazole (PRILOSEC) 20 MG capsule TK 1 C PO D PRN    BD INSULIN SYRINGE ULTRA-FINE 0.5 mL 31 gauge x 5/16 Syrg USE TO INJECT 4X DAILY    benzonatate (TESSALON) 100 MG capsule benzonatate 100 mg capsule    blood sugar diagnostic (FREESTYLE LITE STRIPS MISC) FreeStyle Lite Strips   USE UTD QID    cyclobenzaprine (FLEXERIL) 10 MG tablet cyclobenzaprine 10 mg tablet   TK 1 T PO TID PRF MUSCLE SPASMS    diazePAM (VALIUM) 5 MG tablet diazepam 5 mg tablet    doxycycline (VIBRA-TABS) 100 MG tablet doxycycline hyclate 100 mg tablet    ergocalciferol (ERGOCALCIFEROL) 50,000 unit Cap ergocalciferol (vitamin D2) 50,000 unit capsule    FREESTYLE INSULINX TEST STRIPS Strp TEST BLOOD SUGAR QID    FREESTYLE LANCETS 28 gauge lancets TEST QID    hyaluronate sod, cross-linked (GEL-ONE) 30 mg/3 mL Syrg 3 mLs.    hylan g-f 20 (SYNVISC-ONE) 48 mg/6 mL Syrg 6 mLs.    insulin (BASAGLAR KWIKPEN U-100 INSULIN) glargine 100 units/mL (3mL) SubQ pen Basaglar KwikPen U-100 Insulin 100 unit/mL (3 mL) subcutaneous 35units    insulin syringe-needle U-100 (BD INSULIN SYRINGE ULTRA-FINE) 0.5 mL 31 gauge x 5/16" Syrg BD Insulin Syringe Ultra-Fine 0.5 mL 31 gauge x 5/16"    lancets (FREESTYLE LANCETS) 28 gauge Misc FreeStyle Lancets 28 gauge    meloxicam (MOBIC) 15 MG tablet meloxicam 15 mg tablet    nystatin-triamcinolone (MYCOLOG II) cream Apply to affected area 2 times daily    pen needle, diabetic (BD ULTRA-FINE SUSHANT PEN NEEDLE) 32 gauge x 5/32" Ndle BD Ultra-Fine Sushant Pen Needle 32 gauge x 5/32"    sodium hyaluronate, orthovisc, (ORTHOVISC) 30 mg/2 mL Syrg ORTHOVISC 30 mg/2 mL intra-articular syringe    tizanidine " (ZANAFLEX) 2 MG tablet TK 1 T PO Q 8 H PRN     Family History     Problem Relation (Age of Onset)    Pancreatic cancer Mother        Tobacco Use    Smoking status: Never Smoker    Smokeless tobacco: Never Used   Substance and Sexual Activity    Alcohol use: Yes     Comment: occasional    Drug use: No    Sexual activity: Never     Partners: Male     Birth control/protection: See Surgical Hx     Review of Systems   Constitutional: Negative.    HENT: Negative.    Eyes: Negative.    Respiratory: Positive for cough, chest tightness and shortness of breath.    Cardiovascular: Negative.    Gastrointestinal: Negative.    Endocrine: Negative.    Genitourinary: Negative.    Musculoskeletal: Negative.    Skin: Negative.    Allergic/Immunologic: Negative.    Neurological: Negative.    Hematological: Negative.    All other systems reviewed and are negative.    Objective:     Vital Signs (Most Recent):  Temp: 98.2 °F (36.8 °C) (11/27/19 1211)  Pulse: 84 (11/27/19 1607)  Resp: (!) 21 (11/27/19 1607)  BP: (!) 142/65 (11/27/19 1607)  SpO2: 98 % (11/27/19 1607) Vital Signs (24h Range):  Temp:  [98.2 °F (36.8 °C)] 98.2 °F (36.8 °C)  Pulse:  [83-99] 84  Resp:  [18-26] 21  SpO2:  [79 %-98 %] 98 %  BP: (126-161)/(63-80) 142/65     Weight: 117.9 kg (260 lb)  Body mass index is 47.55 kg/m².    Physical Exam   Constitutional: She is oriented to person, place, and time. She appears well-developed and well-nourished.   HENT:   Head: Normocephalic and atraumatic.   Eyes: Pupils are equal, round, and reactive to light. Conjunctivae and EOM are normal.   Neck: Normal range of motion. Neck supple.   Cardiovascular: Normal rate, regular rhythm, normal heart sounds and intact distal pulses.   Pulmonary/Chest:   Very decreased entry bases no large airway sounds, nor crepitations appreciated. Faint scattered wheezes noted   Abdominal: Soft. Bowel sounds are normal.   Morbidly obese   Musculoskeletal: Normal range of motion.   Neurological: She  is alert and oriented to person, place, and time.   Skin: Skin is warm and dry. Capillary refill takes less than 2 seconds.   Psychiatric: She has a normal mood and affect. Her behavior is normal. Judgment and thought content normal.   Nursing note and vitals reviewed.        CRANIAL NERVES     CN III, IV, VI   Pupils are equal, round, and reactive to light.  Extraocular motions are normal.        Significant Labs:   ABGs:   Recent Labs   Lab 11/27/19  1524   PH 7.364   PCO2 62.4*   HCO3 35.6*   POCSATURATED 88*   BE 10     CBC:   Recent Labs   Lab 11/27/19  1238   WBC 8.99   HGB 10.9*   HCT 38.1        CMP:   Recent Labs   Lab 11/27/19  1238      K 4.3   CL 97   CO2 32*   *   BUN 31*   CREATININE 1.1   CALCIUM 9.0   PROT 7.6   ALBUMIN 3.8   BILITOT 0.9   ALKPHOS 79   AST 13   ALT 12   ANIONGAP 10   EGFRNONAA 55.9*     Troponin:   Recent Labs   Lab 11/27/19  1238   TROPONINI <0.030       Significant Imaging: I have reviewed and interpreted all pertinent imaging results/findings within the past 24 hours.

## 2019-11-27 NOTE — H&P
"[facility] Medicine  History & Physical    Patient Name: iLset Bailon  MRN: [id number]  Admission Date: 2019  Attending Physician: Kenny Rodgers MD  Primary Care Provider: Primary Doctor No         Patient information was obtained from patient, spouse/SO, relative(s) and ER records.     Subjective:     Principal Problem:<principal problem not specified>    Chief Complaint:   Chief Complaint   Patient presents with    Shortness of Breath     sent from doctor office for low pulse ox        HPI: 59 year old female presented to ED from her Pulmonologist's office for acute on chronic respiratory failure. She was hypercapnic with hypoxemia. Her ABG (personally reviewed) upon presentation 7.36/62/59/35. Patient placed on BiPAP. Patient denies any anginal chest discomfort. She is chronically SOB. Wears CPAP at home. Denies orthopnea/PND. CXR personally reviewed shows cardiomegaly, but no infiltrate. EKG (personally reviewed) showed a fib with RVR. However the patient converted to sinus rhythm and stated that she has never had "an irregular heart beat". Patient personally discussed with ED physician, who stated that he had discussed the patient with her Pulmonologist and that ICU admission for titration of BiPAP was warrented    Past Medical History:   Diagnosis Date    Acute on chronic respiratory failure 2019    Arthritis     Asthma     Chronic bronchitis     Diabetes mellitus     Hyperlipidemia     Hypertension     Pain in joint of left shoulder region 2018    Sleep apnea     Thyroid disease        Past Surgical History:   Procedure Laterality Date    APPENDECTOMY       SECTION      x3    HYSTERECTOMY      OOPHORECTOMY Bilateral     age 42       Review of patient's allergies indicates:   Allergen Reactions    Benadryl allergy decongestant     Codeine        No current facility-administered medications on file prior to encounter.      Current Outpatient " Medications on File Prior to Encounter   Medication Sig    albuterol (PROAIR HFA) 90 mcg/actuation inhaler Inhale 2 puffs into the lungs every 6 (six) hours as needed for Wheezing. Rescue    amlodipine (NORVASC) 2.5 MG tablet TK 1 T PO BID    aspirin (ECOTRIN) 81 MG EC tablet Take 81 mg by mouth once daily.    atorvastatin (LIPITOR) 20 MG tablet TK 1 T PO QPM    azithromycin (ZITHROMAX Z-KAM) 250 MG tablet Take 2 po today then 1 a day for 4 days    BASAGLAR KWIKPEN U-100 INSULIN glargine 100 units/mL (3mL) SubQ pen INJECT SUBCUTANEOUS 35 UNITS EVERY MORNING    fluticasone-salmeterol diskus inhaler 250-50 mcg INHALE ONE PUFF INTO THE LUNGS TWICE DAILY    gabapentin (NEURONTIN) 300 MG capsule TAKE ONE CAPSULE BY MOUTH IN THE MORNING, ONE CAPSULE AT NOON, AND 2 CAPSULES BEFORE BED    glimepiride (AMARYL) 4 MG tablet Take 4 mg by mouth 2 (two) times daily.     hydrochlorothiazide (HYDRODIURIL) 25 MG tablet TK 1 T PO D    insulin aspart U-100 (NOVOLOG FLEXPEN U-100 INSULIN) 100 unit/mL InPn pen Novolog Flexpen U-100 Insulin aspart 100 unit/mL subcutaneous   INJECT 15 UNITS SUBCUTANEOUS THREE TIMES A DAY    irbesartan (AVAPRO) 300 MG tablet TK 1 T PO D    levalbuterol (XOPENEX) 0.63 mg/3 mL nebulizer solution USE 1 VIAL VIA NEBULIZER THREE TIMES DAILY AS NEEDED FOR WHEEZING    levothyroxine (SYNTHROID) 25 MCG tablet TK 1 T PO D    liraglutide 0.6 mg/0.1 mL, 18 mg/3 mL, subq PNIJ (VICTOZA 3-KAM) 0.6 mg/0.1 mL (18 mg/3 mL) PnIj Victoza 3-Kam 0.6 mg/0.1 mL (18 mg/3 mL) subcutaneous pen injector    metformin (GLUCOPHAGE) 500 MG tablet TK 1 T PO BID    omeprazole (PRILOSEC) 20 MG capsule TK 1 C PO D PRN    BD INSULIN SYRINGE ULTRA-FINE 0.5 mL 31 gauge x 5/16 Syrg USE TO INJECT 4X DAILY    benzonatate (TESSALON) 100 MG capsule benzonatate 100 mg capsule    blood sugar diagnostic (FREESTYLE LITE STRIPS MISC) FreeStyle Lite Strips   USE UTD QID    cyclobenzaprine (FLEXERIL) 10 MG tablet cyclobenzaprine 10 mg  "tablet   TK 1 T PO TID PRF MUSCLE SPASMS    diazePAM (VALIUM) 5 MG tablet diazepam 5 mg tablet    doxycycline (VIBRA-TABS) 100 MG tablet doxycycline hyclate 100 mg tablet    ergocalciferol (ERGOCALCIFEROL) 50,000 unit Cap ergocalciferol (vitamin D2) 50,000 unit capsule    FREESTYLE INSULINX TEST STRIPS Strp TEST BLOOD SUGAR QID    FREESTYLE LANCETS 28 gauge lancets TEST QID    hyaluronate sod, cross-linked (GEL-ONE) 30 mg/3 mL Syrg 3 mLs.    hylan g-f 20 (SYNVISC-ONE) 48 mg/6 mL Syrg 6 mLs.    insulin (BASAGLAR KWIKPEN U-100 INSULIN) glargine 100 units/mL (3mL) SubQ pen Basaglar KwikPen U-100 Insulin 100 unit/mL (3 mL) subcutaneous 35units    insulin syringe-needle U-100 (BD INSULIN SYRINGE ULTRA-FINE) 0.5 mL 31 gauge x 5/16" Syrg BD Insulin Syringe Ultra-Fine 0.5 mL 31 gauge x 5/16"    lancets (FREESTYLE LANCETS) 28 gauge Misc FreeStyle Lancets 28 gauge    meloxicam (MOBIC) 15 MG tablet meloxicam 15 mg tablet    nystatin-triamcinolone (MYCOLOG II) cream Apply to affected area 2 times daily    pen needle, diabetic (BD ULTRA-FINE SUSHANT PEN NEEDLE) 32 gauge x 5/32" Ndle BD Ultra-Fine Sushant Pen Needle 32 gauge x 5/32"    sodium hyaluronate, orthovisc, (ORTHOVISC) 30 mg/2 mL Syrg ORTHOVISC 30 mg/2 mL intra-articular syringe    tizanidine (ZANAFLEX) 2 MG tablet TK 1 T PO Q 8 H PRN     Family History     Problem Relation (Age of Onset)    Pancreatic cancer Mother        Tobacco Use    Smoking status: Never Smoker    Smokeless tobacco: Never Used   Substance and Sexual Activity    Alcohol use: Yes     Comment: occasional    Drug use: No    Sexual activity: Never     Partners: Male     Birth control/protection: See Surgical Hx     Review of Systems   Constitutional: Negative.    HENT: Negative.    Eyes: Negative.    Respiratory: Positive for cough, chest tightness and shortness of breath.    Cardiovascular: Negative.    Gastrointestinal: Negative.    Endocrine: Negative.    Genitourinary: Negative.  "   Musculoskeletal: Negative.    Skin: Negative.    Allergic/Immunologic: Negative.    Neurological: Negative.    Hematological: Negative.    All other systems reviewed and are negative.    Objective:     Vital Signs (Most Recent):  Temp: 98.2 °F (36.8 °C) (11/27/19 1211)  Pulse: 84 (11/27/19 1607)  Resp: (!) 21 (11/27/19 1607)  BP: (!) 142/65 (11/27/19 1607)  SpO2: 98 % (11/27/19 1607) Vital Signs (24h Range):  Temp:  [98.2 °F (36.8 °C)] 98.2 °F (36.8 °C)  Pulse:  [83-99] 84  Resp:  [18-26] 21  SpO2:  [79 %-98 %] 98 %  BP: (126-161)/(63-80) 142/65     Weight: 117.9 kg (260 lb)  Body mass index is 47.55 kg/m².    Physical Exam   Constitutional: She is oriented to person, place, and time. She appears well-developed and well-nourished.   HENT:   Head: Normocephalic and atraumatic.   Eyes: Pupils are equal, round, and reactive to light. Conjunctivae and EOM are normal.   Neck: Normal range of motion. Neck supple.   Cardiovascular: Normal rate, regular rhythm, normal heart sounds and intact distal pulses.   Pulmonary/Chest:   Very decreased entry bases no large airway sounds, nor crepitations appreciated. Faint scattered wheezes noted   Abdominal: Soft. Bowel sounds are normal.   Morbidly obese   Musculoskeletal: Normal range of motion.   Neurological: She is alert and oriented to person, place, and time.   Skin: Skin is warm and dry. Capillary refill takes less than 2 seconds.   Psychiatric: She has a normal mood and affect. Her behavior is normal. Judgment and thought content normal.   Nursing note and vitals reviewed.        CRANIAL NERVES     CN III, IV, VI   Pupils are equal, round, and reactive to light.  Extraocular motions are normal.        Significant Labs:   ABGs:   Recent Labs   Lab 11/27/19  1524   PH 7.364   PCO2 62.4*   HCO3 35.6*   POCSATURATED 88*   BE 10     CBC:   Recent Labs   Lab 11/27/19  1238   WBC 8.99   HGB 10.9*   HCT 38.1        CMP:   Recent Labs   Lab 11/27/19  1238      K 4.3    CL 97   CO2 32*   *   BUN 31*   CREATININE 1.1   CALCIUM 9.0   PROT 7.6   ALBUMIN 3.8   BILITOT 0.9   ALKPHOS 79   AST 13   ALT 12   ANIONGAP 10   EGFRNONAA 55.9*     Troponin:   Recent Labs   Lab 11/27/19  1238   TROPONINI <0.030       Significant Imaging: I have reviewed and interpreted all pertinent imaging results/findings within the past 24 hours.    Assessment/Plan:     Paroxysmal atrial fibrillation with rapid ventricular response  Serial biomarkers, Cardiology opinion, EKG in AM, TSH with AM labs      Acute on chronic respiratory failure with hypoxia and hypercapnia    ICU admission, BiPAP for titration, Pulmonology consultation    Morbid obesity  On liraglutide already  Dietary consultation    Obstructive sleep apnea syndrome    ICU admission, BiPAP for titration, Pulmonology consultation    Mild persistent asthma without complication    ICU admission, BiPAP for titration, Pulmonology consultation      VTE Risk Mitigation (From admission, onward)         Ordered     enoxaparin injection 40 mg  Every 24 hours (non-standard times)      11/27/19 4462                   Kenny Rodgers MD  Department of Hospital Medicine   Formerly [facility]

## 2019-11-27 NOTE — ASSESSMENT & PLAN NOTE
· Pt not at her baseline  · Respiratory treatments  · O2 and wean as able  · Check BNP  · Rate control of atrial fibrillation  · CPAP when sleeping

## 2019-11-27 NOTE — PROGRESS NOTES
Results for MILLER HOPPER (MRN [id number]) as of 11/27/2019 15:24   Ref. Range 11/27/2019 15:24   POC PH Latest Ref Range: 7.35 - 7.45  7.364   POC PCO2 Latest Ref Range: 35 - 45 mmHg 62.4 (HH)   POC PO2 Latest Ref Range: 80 - 100 mmHg 59 (LL)   POC BE Latest Ref Range: -2 to 2 mmol/L 10   POC HCO3 Latest Ref Range: 24 - 28 mmol/L 35.6 (H)   POC SATURATED O2 Latest Ref Range: 95 - 100 % 88 (L)   POC TCO2 Latest Ref Range: 23 - 27 mmol/L 37 (H)   Flow Unknown 3   Sample Unknown ARTERIAL   DelSys Unknown Nasal Can   Allens Test Unknown Pass   Site Unknown RR   Mode Unknown SPONT   ABG results given to Dr. Burt at bedside. Pt requested to be taken off Bipap for ABG stick, educated on need to remain on Bipap right now. Placed back on Bipap per Dr. Burt per ABG results.

## 2019-11-27 NOTE — HPI
60 yo female known to me, has h/o chronic hypoxemic respiratory failure (uses O2 3 LPM QHS and prn), sleep apnea (on CPAP - 10 at home), mild asthma last seen 6/2019 and was doing well.  Presented to my office today with complaints of not breathing well for the last 2-3 weeks, + increased dyspnea (now with minimal exertion), desaturations (reports sats in low 80's at home - last office sat was 93% on RA at rest).  In office today had initial sta 79% and increased to 90's on 3 LPM.  She also complained of increased somnolence.   has concerns that CPAP may not be working correctly and reports that filters wer very dirt (has ordered replacements).  Her mentation did not seem to be at her baseline and she was dyspneic with minima; exertion and was sent to ER for evaluation.  Case was d/w Dr Mast by phone.  In ER EKG showed atrial fibrillation with RVR rate at 126.

## 2019-11-28 LAB
ALLENS TEST: ABNORMAL
ANION GAP SERPL CALC-SCNC: 9 MMOL/L (ref 8–16)
BUN SERPL-MCNC: 26 MG/DL (ref 6–20)
CALCIUM SERPL-MCNC: 9 MG/DL (ref 8.7–10.5)
CHLORIDE SERPL-SCNC: 97 MMOL/L (ref 95–110)
CO2 SERPL-SCNC: 33 MMOL/L (ref 23–29)
CREAT SERPL-MCNC: 0.9 MG/DL (ref 0.5–1.4)
DELSYS: ABNORMAL
EST. GFR  (AFRICAN AMERICAN): >60 ML/MIN/1.73 M^2
EST. GFR  (NON AFRICAN AMERICAN): >60 ML/MIN/1.73 M^2
ESTIMATED AVG GLUCOSE: 183 MG/DL (ref 68–131)
FIO2: 30
GLUCOSE SERPL-MCNC: 281 MG/DL (ref 70–110)
GLUCOSE SERPL-MCNC: 317 MG/DL (ref 70–110)
GLUCOSE SERPL-MCNC: 329 MG/DL (ref 70–110)
GLUCOSE SERPL-MCNC: 337 MG/DL (ref 70–110)
GLUCOSE SERPL-MCNC: 379 MG/DL (ref 70–110)
GLUCOSE SERPL-MCNC: 401 MG/DL (ref 70–110)
HBA1C MFR BLD HPLC: 8 % (ref 4.5–6.2)
HCO3 UR-SCNC: 31.9 MMOL/L (ref 24–28)
HCT VFR BLD CALC: 36 %PCV (ref 36–54)
MIN VOL: 8
MODE: ABNORMAL
PCO2 BLDA: 58.8 MMHG (ref 35–45)
PEEP: 10
PH SMN: 7.34 [PH] (ref 7.35–7.45)
PO2 BLDA: 95 MMHG (ref 80–100)
POC BE: 6 MMOL/L
POC IONIZED CALCIUM: 1.23 MMOL/L (ref 1.06–1.42)
POC SATURATED O2: 97 % (ref 95–100)
POC TCO2: 34 MMOL/L (ref 23–27)
POTASSIUM BLD-SCNC: 4.8 MMOL/L (ref 3.5–5.1)
POTASSIUM SERPL-SCNC: 5.4 MMOL/L (ref 3.5–5.1)
SAMPLE: ABNORMAL
SITE: ABNORMAL
SODIUM BLD-SCNC: 136 MMOL/L (ref 136–145)
SODIUM SERPL-SCNC: 139 MMOL/L (ref 136–145)
SP02: 95
SPONT RATE: 26

## 2019-11-28 PROCEDURE — 94640 AIRWAY INHALATION TREATMENT: CPT

## 2019-11-28 PROCEDURE — 83036 HEMOGLOBIN GLYCOSYLATED A1C: CPT

## 2019-11-28 PROCEDURE — 21400001 HC TELEMETRY ROOM

## 2019-11-28 PROCEDURE — 25000003 PHARM REV CODE 250: Performed by: INTERNAL MEDICINE

## 2019-11-28 PROCEDURE — 82803 BLOOD GASES ANY COMBINATION: CPT

## 2019-11-28 PROCEDURE — 99233 SBSQ HOSP IP/OBS HIGH 50: CPT | Mod: ,,, | Performed by: INTERNAL MEDICINE

## 2019-11-28 PROCEDURE — 82962 GLUCOSE BLOOD TEST: CPT

## 2019-11-28 PROCEDURE — 99900035 HC TECH TIME PER 15 MIN (STAT)

## 2019-11-28 PROCEDURE — 84295 ASSAY OF SERUM SODIUM: CPT

## 2019-11-28 PROCEDURE — 82330 ASSAY OF CALCIUM: CPT

## 2019-11-28 PROCEDURE — 63600175 PHARM REV CODE 636 W HCPCS: Performed by: INTERNAL MEDICINE

## 2019-11-28 PROCEDURE — 99233 PR SUBSEQUENT HOSPITAL CARE,LEVL III: ICD-10-PCS | Mod: ,,, | Performed by: INTERNAL MEDICINE

## 2019-11-28 PROCEDURE — 25000242 PHARM REV CODE 250 ALT 637 W/ HCPCS: Performed by: INTERNAL MEDICINE

## 2019-11-28 PROCEDURE — 94761 N-INVAS EAR/PLS OXIMETRY MLT: CPT

## 2019-11-28 PROCEDURE — 20000000 HC ICU ROOM

## 2019-11-28 PROCEDURE — 36415 COLL VENOUS BLD VENIPUNCTURE: CPT

## 2019-11-28 PROCEDURE — 94660 CPAP INITIATION&MGMT: CPT

## 2019-11-28 PROCEDURE — 27000221 HC OXYGEN, UP TO 24 HOURS

## 2019-11-28 PROCEDURE — 80048 BASIC METABOLIC PNL TOTAL CA: CPT

## 2019-11-28 PROCEDURE — 84132 ASSAY OF SERUM POTASSIUM: CPT

## 2019-11-28 PROCEDURE — 85014 HEMATOCRIT: CPT

## 2019-11-28 PROCEDURE — 36600 WITHDRAWAL OF ARTERIAL BLOOD: CPT

## 2019-11-28 PROCEDURE — 25000003 PHARM REV CODE 250

## 2019-11-28 RX ORDER — DILTIAZEM HYDROCHLORIDE 120 MG/1
120 CAPSULE, COATED, EXTENDED RELEASE ORAL DAILY
Status: DISCONTINUED | OUTPATIENT
Start: 2019-11-28 | End: 2019-12-01 | Stop reason: HOSPADM

## 2019-11-28 RX ORDER — MUPIROCIN 20 MG/G
OINTMENT TOPICAL 2 TIMES DAILY
Status: DISCONTINUED | OUTPATIENT
Start: 2019-11-28 | End: 2019-12-01 | Stop reason: HOSPADM

## 2019-11-28 RX ORDER — CHLORHEXIDINE GLUCONATE ORAL RINSE 1.2 MG/ML
15 SOLUTION DENTAL 2 TIMES DAILY
Status: DISCONTINUED | OUTPATIENT
Start: 2019-11-28 | End: 2019-12-01 | Stop reason: HOSPADM

## 2019-11-28 RX ADMIN — IPRATROPIUM BROMIDE AND ALBUTEROL SULFATE 3 ML: .5; 3 SOLUTION RESPIRATORY (INHALATION) at 08:11

## 2019-11-28 RX ADMIN — IPRATROPIUM BROMIDE AND ALBUTEROL SULFATE 3 ML: .5; 3 SOLUTION RESPIRATORY (INHALATION) at 07:11

## 2019-11-28 RX ADMIN — PANTOPRAZOLE SODIUM 40 MG: 40 TABLET, DELAYED RELEASE ORAL at 09:11

## 2019-11-28 RX ADMIN — INSULIN ASPART 6 UNITS: 100 INJECTION, SOLUTION INTRAVENOUS; SUBCUTANEOUS at 08:11

## 2019-11-28 RX ADMIN — MUPIROCIN: 20 OINTMENT TOPICAL at 09:11

## 2019-11-28 RX ADMIN — ATORVASTATIN CALCIUM 20 MG: 20 TABLET, FILM COATED ORAL at 09:11

## 2019-11-28 RX ADMIN — METHYLPREDNISOLONE SODIUM SUCCINATE 40 MG: 40 INJECTION, POWDER, FOR SOLUTION INTRAMUSCULAR; INTRAVENOUS at 09:11

## 2019-11-28 RX ADMIN — INSULIN ASPART 8 UNITS: 100 INJECTION, SOLUTION INTRAVENOUS; SUBCUTANEOUS at 12:11

## 2019-11-28 RX ADMIN — INSULIN ASPART 5 UNITS: 100 INJECTION, SOLUTION INTRAVENOUS; SUBCUTANEOUS at 09:11

## 2019-11-28 RX ADMIN — IRBESARTAN 300 MG: 150 TABLET ORAL at 09:11

## 2019-11-28 RX ADMIN — GABAPENTIN 300 MG: 300 CAPSULE ORAL at 09:11

## 2019-11-28 RX ADMIN — LEVOFLOXACIN 750 MG: 750 TABLET, FILM COATED ORAL at 09:11

## 2019-11-28 RX ADMIN — METHYLPREDNISOLONE SODIUM SUCCINATE 40 MG: 40 INJECTION, POWDER, FOR SOLUTION INTRAMUSCULAR; INTRAVENOUS at 01:11

## 2019-11-28 RX ADMIN — LEVOTHYROXINE SODIUM 25 MCG: 25 TABLET ORAL at 07:11

## 2019-11-28 RX ADMIN — ASPIRIN 81 MG: 81 TABLET, COATED ORAL at 09:11

## 2019-11-28 RX ADMIN — HYDROCHLOROTHIAZIDE 25 MG: 25 TABLET ORAL at 09:11

## 2019-11-28 RX ADMIN — DILTIAZEM HYDROCHLORIDE 120 MG: 120 CAPSULE, COATED, EXTENDED RELEASE ORAL at 10:11

## 2019-11-28 RX ADMIN — APIXABAN 5 MG: 5 TABLET, FILM COATED ORAL at 09:11

## 2019-11-28 RX ADMIN — METHYLPREDNISOLONE SODIUM SUCCINATE 40 MG: 40 INJECTION, POWDER, FOR SOLUTION INTRAMUSCULAR; INTRAVENOUS at 07:11

## 2019-11-28 RX ADMIN — CHLORHEXIDINE GLUCONATE 15 ML: 1.2 RINSE ORAL at 09:11

## 2019-11-28 RX ADMIN — GABAPENTIN 300 MG: 300 CAPSULE ORAL at 03:11

## 2019-11-28 RX ADMIN — INSULIN ASPART 6 UNITS: 100 INJECTION, SOLUTION INTRAVENOUS; SUBCUTANEOUS at 05:11

## 2019-11-28 RX ADMIN — IPRATROPIUM BROMIDE AND ALBUTEROL SULFATE 3 ML: .5; 3 SOLUTION RESPIRATORY (INHALATION) at 01:11

## 2019-11-28 RX ADMIN — FLUTICASONE FUROATE AND VILANTEROL TRIFENATATE 1 PUFF: 100; 25 POWDER RESPIRATORY (INHALATION) at 07:11

## 2019-11-28 NOTE — CARE UPDATE
11/27/19 1440   Patient Assessment/Suction   Level of Consciousness (AVPU) alert   Respiratory Effort Normal;Unlabored   Expansion/Accessory Muscles/Retractions no retractions;no use of accessory muscles   PRE-TX-O2   O2 Device (Oxygen Therapy) BiPAP   $ Is the patient on Low Flow Oxygen? Yes   Oxygen Concentration (%) 35   SpO2 100 %   Pulse Oximetry Type Continuous   Pulse 90   Resp (!) 35   Preset CPAP/BiPAP Settings   Mode Of Delivery BiPAP   $ CPAP/BiPAP Daily Charge BiPAP/CPAP Daily   $ Initial CPAP/BiPAP Setup? Yes   $ Is patient using? Yes   Size of Mask Small   Sized Appropriately? Yes   Equipment Type V60   Airway Device Type medium nasal mask   Humidifier not applicable   Ipap 20   EPAP (cm H2O) 8   Pressure Support (cm H2O) 12   Set Rate (Breaths/Min) 20   ITime (sec) 0.9   Rise Time (sec) 3   Patient CPAP/BiPAP Settings   Low (L/Min) 3   IPAP Rise Time (sec) 3   RR Total (Breaths/Min) 23   Tidal Volume (mL) 212   VE Minute Ventilation (L/min) 4.5 L/min   Peak Inspiratory Pressure (cm H2O) 18   TiTOT (%) 35   Total Leak (L/Min) 5   Patient Trigger - ST Mode Only (%) 41   CPAP/BiPAP Alarms   Minute Ventilation (L/Min) 3   High RR (breaths/min) 40   Low RR (breaths/min) 10   Pt not placed on Bipap sooner due to refusing to wear full face mask, placed on Bipap as soon as nasal mask was located. Will obtain ABG in 30 minutes to assess CO2 levels per Dr. Hernandez.

## 2019-11-28 NOTE — PLAN OF CARE
11/28/19 0754   Patient Assessment/Suction   Level of Consciousness (AVPU) alert   Respiratory Effort Normal;Unlabored   All Lung Fields Breath Sounds diminished;clear   PRE-TX-O2   O2 Device (Oxygen Therapy) nasal cannula   $ Is the patient on Low Flow Oxygen? Yes   Flow (L/min) 3   SpO2 (!) 94 %   Pulse Oximetry Type Continuous   $ Pulse Oximetry - Multiple Charge Pulse Oximetry - Multiple   Pulse 99   Resp 18   Aerosol Therapy   $ Aerosol Therapy Charges Aerosol Treatment   Daily Review of Necessity (SVN) completed   Respiratory Treatment Status (SVN) given   Treatment Route (SVN) mouthpiece;oxygen   Patient Position (SVN) sitting on edge of bed   Post Treatment Assessment (SVN) breath sounds improved   Signs of Intolerance (SVN) none   Inhaler   $ Inhaler Charges MDI (Metered Dose Inahler) Treatment   Daily Review of Necessity (Inhaler) completed   Respiratory Treatment Status (Inhaler) given;mouth rinsed post treatment   Treatment Route (Inhaler) mouthpiece   Patient Position (Inhaler) sitting on edge of bed   Post Treatment Assessment (Inhaler) breath sounds unchanged   Signs of Intolerance (Inhaler) none   Preset CPAP/BiPAP Settings   Mode Of Delivery Standby   $ CPAP/BiPAP Daily Charge BiPAP/CPAP Daily   $ Is patient using? Yes   Equipment Type V60

## 2019-11-28 NOTE — SUBJECTIVE & OBJECTIVE
Interval History:     Review of Systems   Constitutional: Positive for fatigue. Negative for activity change and appetite change.   HENT: Negative for congestion and dental problem.    Eyes: Negative for discharge and itching.   Respiratory: Negative for shortness of breath.    Cardiovascular: Negative for chest pain.   Gastrointestinal: Negative for abdominal distention and abdominal pain.   Endocrine: Negative for cold intolerance.   Genitourinary: Negative for difficulty urinating and dysuria.   Musculoskeletal: Negative for arthralgias and back pain.   Skin: Negative for color change.   Neurological: Negative for dizziness and facial asymmetry.   Hematological: Negative for adenopathy.   Psychiatric/Behavioral: Negative for agitation and behavioral problems.     Objective:     Vital Signs (Most Recent):  Temp: 97.6 °F (36.4 °C) (11/28/19 1200)  Pulse: 90 (11/28/19 1353)  Resp: (!) 28 (11/28/19 1353)  BP: (!) 150/70 (11/28/19 1300)  SpO2: 95 % (11/28/19 1353) Vital Signs (24h Range):  Temp:  [97.6 °F (36.4 °C)-98.6 °F (37 °C)] 97.6 °F (36.4 °C)  Pulse:  [66-99] 90  Resp:  [18-45] 28  SpO2:  [83 %-100 %] 95 %  BP: (115-150)/(58-75) 150/70     Weight: 127.5 kg (281 lb 1.4 oz)  Body mass index is 51.41 kg/m².    Intake/Output Summary (Last 24 hours) at 11/28/2019 1519  Last data filed at 11/28/2019 0301  Gross per 24 hour   Intake 360 ml   Output --   Net 360 ml      Physical Exam   Constitutional: She is oriented to person, place, and time. No distress.   Eyes: Pupils are equal, round, and reactive to light. EOM are normal.   Neck: Neck supple.   Cardiovascular: Normal rate.   Pulmonary/Chest: Effort normal and breath sounds normal.   Abdominal: Soft. Bowel sounds are normal.   Morbidly obese   Musculoskeletal: Normal range of motion. She exhibits no edema.   Neurological: She is alert and oriented to person, place, and time.   Skin: Skin is warm.   Psychiatric: She has a normal mood and affect.   Nursing note and  vitals reviewed.      Significant Labs:   CBC:   Recent Labs   Lab 11/27/19  1238 11/28/19  1350   WBC 8.99  --    HGB 10.9*  --    HCT 38.1 36     --      CMP:   Recent Labs   Lab 11/27/19  1238 11/28/19  0405    139   K 4.3 5.4*   CL 97 97   CO2 32* 33*   * 329*   BUN 31* 26*   CREATININE 1.1 0.9   CALCIUM 9.0 9.0   PROT 7.6  --    ALBUMIN 3.8  --    BILITOT 0.9  --    ALKPHOS 79  --    AST 13  --    ALT 12  --    ANIONGAP 10 9   EGFRNONAA 55.9* >60.0       Significant Imaging: I have reviewed all pertinent imaging results/findings within the past 24 hours.

## 2019-11-28 NOTE — HOSPITAL COURSE
Patient seen in ICU today  She is in better spirits  ABG done today is  Better  Patient in need of Trilogy per pulmonology MD  Per  it will take some days before her insurance and equipment company to arrange trilogy   Patients BiPAP setting here was 15/5 and her family members were able to adjust this same setting on her home BiPAP  Patient requesting 1 more day night in the hospital before getting discharged back to home, because she is worried she may come back soon

## 2019-11-28 NOTE — CONSULTS
"[facility]  Cardiology  Consult    Patient Name: Liset Bailon  MRN: [id number]  Admission Date: 11/27/2019  Code Status: Full Code   Attending Provider: Kenny Rodgers MD   Primary Care Physician: Primary Doctor No  Principal Problem:<principal problem not specified>    Patient information was obtained from patient and ER records.     Subjective:     Chief Complaint:   SOB    Reason for Consult : AFIB      Ms. Bailon is a 59 year old female patient whom lives in [place] and is followed by  whom advised her to come to ER for hypoxemia. Upon admission one of her EKGs show AFIB and we have been consulted for the same. She has no history of heart disease. She has no known history of AFIB. She denies palpitations. She is currently SR. She denies recent fever or chills. No nausea or vomiting. No lightheadedness or dizziness. HGa1c in 8.0. Troponin is negative.  Patient is breathing better and denies having any chest discomfort on neck or jaw pain.        HPI:   From H and P    "59 year old female presented to ED from her Pulmonologist's office for acute on chronic respiratory failure. She was hypercapnic with hypoxemia. Her ABG (personally reviewed) upon presentation 7.36/62/59/35. Patient placed on BiPAP. Patient denies any anginal chest discomfort. She is chronically SOB. Wears CPAP at home. Denies orthopnea/PND. CXR personally reviewed shows cardiomegaly, but no infiltrate. EKG (personally reviewed) showed a fib with RVR. However the patient converted to sinus rhythm and stated that she has never had "an irregular heart beat". Patient personally discussed with ED physician, who stated that he had discussed the patient with her Pulmonologist and that ICU admission for titration of BiPAP was warrented"    Past Medical History:   Diagnosis Date    Acute on chronic respiratory failure 8/13/2019    Arthritis     Asthma     Chronic bronchitis     Diabetes mellitus     Hyperlipidemia     " Hypertension     Pain in joint of left shoulder region 2018    Sleep apnea     Thyroid disease        Past Surgical History:   Procedure Laterality Date    APPENDECTOMY       SECTION      x3    HYSTERECTOMY      OOPHORECTOMY Bilateral     age 42       Review of patient's allergies indicates:   Allergen Reactions    Benadryl allergy decongestant     Codeine        No current facility-administered medications on file prior to encounter.      Current Outpatient Medications on File Prior to Encounter   Medication Sig    albuterol (PROAIR HFA) 90 mcg/actuation inhaler Inhale 2 puffs into the lungs every 6 (six) hours as needed for Wheezing. Rescue    amlodipine (NORVASC) 2.5 MG tablet TK 1 T PO BID    aspirin (ECOTRIN) 81 MG EC tablet Take 81 mg by mouth once daily.    atorvastatin (LIPITOR) 20 MG tablet TK 1 T PO QPM    azithromycin (ZITHROMAX Z-KAM) 250 MG tablet Take 2 po today then 1 a day for 4 days    BASAGLAR KWIKPEN U-100 INSULIN glargine 100 units/mL (3mL) SubQ pen INJECT SUBCUTANEOUS 35 UNITS EVERY MORNING    fluticasone-salmeterol diskus inhaler 250-50 mcg INHALE ONE PUFF INTO THE LUNGS TWICE DAILY    gabapentin (NEURONTIN) 300 MG capsule TAKE ONE CAPSULE BY MOUTH IN THE MORNING, ONE CAPSULE AT NOON, AND 2 CAPSULES BEFORE BED    glimepiride (AMARYL) 4 MG tablet Take 4 mg by mouth 2 (two) times daily.     hydrochlorothiazide (HYDRODIURIL) 25 MG tablet TK 1 T PO D    insulin aspart U-100 (NOVOLOG FLEXPEN U-100 INSULIN) 100 unit/mL InPn pen Novolog Flexpen U-100 Insulin aspart 100 unit/mL subcutaneous   INJECT 15 UNITS SUBCUTANEOUS THREE TIMES A DAY    irbesartan (AVAPRO) 300 MG tablet TK 1 T PO D    levalbuterol (XOPENEX) 0.63 mg/3 mL nebulizer solution USE 1 VIAL VIA NEBULIZER THREE TIMES DAILY AS NEEDED FOR WHEEZING    levothyroxine (SYNTHROID) 25 MCG tablet TK 1 T PO D    liraglutide 0.6 mg/0.1 mL, 18 mg/3 mL, subq PNIJ (VICTOZA 3-KAM) 0.6 mg/0.1 mL (18 mg/3 mL) PnIj  "Victoza 3-Will 0.6 mg/0.1 mL (18 mg/3 mL) subcutaneous pen injector    metformin (GLUCOPHAGE) 500 MG tablet TK 1 T PO BID    omeprazole (PRILOSEC) 20 MG capsule TK 1 C PO D PRN    BD INSULIN SYRINGE ULTRA-FINE 0.5 mL 31 gauge x 5/16 Syrg USE TO INJECT 4X DAILY    benzonatate (TESSALON) 100 MG capsule benzonatate 100 mg capsule    blood sugar diagnostic (FREESTYLE LITE STRIPS MISC) FreeStyle Lite Strips   USE UTD QID    cyclobenzaprine (FLEXERIL) 10 MG tablet cyclobenzaprine 10 mg tablet   TK 1 T PO TID PRF MUSCLE SPASMS    diazePAM (VALIUM) 5 MG tablet diazepam 5 mg tablet    doxycycline (VIBRA-TABS) 100 MG tablet doxycycline hyclate 100 mg tablet    ergocalciferol (ERGOCALCIFEROL) 50,000 unit Cap ergocalciferol (vitamin D2) 50,000 unit capsule    FREESTYLE INSULINX TEST STRIPS Strp TEST BLOOD SUGAR QID    FREESTYLE LANCETS 28 gauge lancets TEST QID    hyaluronate sod, cross-linked (GEL-ONE) 30 mg/3 mL Syrg 3 mLs.    hylan g-f 20 (SYNVISC-ONE) 48 mg/6 mL Syrg 6 mLs.    insulin (BASAGLAR KWIKPEN U-100 INSULIN) glargine 100 units/mL (3mL) SubQ pen Basaglar KwikPen U-100 Insulin 100 unit/mL (3 mL) subcutaneous 35units    insulin syringe-needle U-100 (BD INSULIN SYRINGE ULTRA-FINE) 0.5 mL 31 gauge x 5/16" Syrg BD Insulin Syringe Ultra-Fine 0.5 mL 31 gauge x 5/16"    lancets (FREESTYLE LANCETS) 28 gauge Misc FreeStyle Lancets 28 gauge    meloxicam (MOBIC) 15 MG tablet meloxicam 15 mg tablet    nystatin-triamcinolone (MYCOLOG II) cream Apply to affected area 2 times daily    pen needle, diabetic (BD ULTRA-FINE SUSHANT PEN NEEDLE) 32 gauge x 5/32" Ndle BD Ultra-Fine Sushant Pen Needle 32 gauge x 5/32"    sodium hyaluronate, orthovisc, (ORTHOVISC) 30 mg/2 mL Syrg ORTHOVISC 30 mg/2 mL intra-articular syringe    tizanidine (ZANAFLEX) 2 MG tablet TK 1 T PO Q 8 H PRN     Family History     Problem Relation (Age of Onset)    Pancreatic cancer Mother        Tobacco Use    Smoking status: Never Smoker    Smokeless " tobacco: Never Used   Substance and Sexual Activity    Alcohol use: Yes     Comment: occasional    Drug use: No    Sexual activity: Never     Partners: Male     Birth control/protection: See Surgical Hx       REVIEW OF SYSTEMS:    Constitutional: Negative for chills, fatigue and fever.   Eyes: No double vision, No blurred vision  Neuro: No headaches, No dizziness  Respiratory: +sob and wheezing  Cardiovascular: Negative for chest pain. Negative for palpitations and leg swelling.   Gastrointestinal: Negative for abdominal pain, No melena, diarrhea, nausea and vomiting.   Genitourinary: Negative for dysuria and frequency, Negative for hematuria  Skin: Negative for bruising, Negative for edema or discoloration noted.   Endocrine: Negative for polyphagia, Negative for heat intolerance, Negative for cold intolerance  Psychiatric: Negative for depression, Negative for anxiety, Negative for memory loss  Musculoskeletal: Negative for neck pain, Negative for muscle weakness, Negative for back pain     Objective:     Vital Signs (Most Recent):  Temp: 98.6 °F (37 °C) (11/27/19 2300)  Pulse: 99 (11/28/19 0900)  Resp: (!) 45 (11/28/19 0900)  BP: 124/64 (11/28/19 1014)  SpO2: (!) 90 % (11/28/19 0900) Vital Signs (24h Range):  Temp:  [97.8 °F (36.6 °C)-98.6 °F (37 °C)] 98.6 °F (37 °C)  Pulse:  [66-99] 99  Resp:  [18-45] 45  SpO2:  [79 %-100 %] 90 %  BP: (115-161)/(58-80) 124/64     Weight: 127.5 kg (281 lb 1.4 oz)  Body mass index is 51.41 kg/m².    SpO2: (!) 90 %  O2 Device (Oxygen Therapy): nasal cannula      Intake/Output Summary (Last 24 hours) at 11/28/2019 1016  Last data filed at 11/28/2019 0301  Gross per 24 hour   Intake 360 ml   Output --   Net 360 ml       Lines/Drains/Airways     Peripheral Intravenous Line                 Peripheral IV - Single Lumen 11/27/19 1238 20 G Right Antecubital less than 1 day         Peripheral IV - Single Lumen 11/27/19 1917 20 G Left Hand less than 1 day                PHYSICAL  EXAM:    GENERAL: well built, well nourished, well-developed in no apparent distress alert and oriented.  Obese female in no apparent distress  HEENT: Normocephalic. Pupils normal and conjunctivae normal.  Mucous membranes normal, no cyanosis or icterus, trachea central,no pallor or icterus is noted..   NECK: No JVD. No bruit..   THYROID: Thyroid not enlarged. No nodules present..   CARDIAC: Regular rate and rhythm. S1 is normal.S2 is normal.No gallops, clicks or murmurs noted at this time.  CHEST ANATOMY: normal.   LUNGS: Diminished breath sounds bilaterally   ABDOMEN: Soft no masses or organomegaly.  No abdomen pulsations or bruits.  Normal bowel sounds. No pulsations and no masses felt, No guarding or rebound.   URINARY: No [place] catheter   EXTREMITIES: No cyanosis, clubbing or edema noted at this time., no calf tenderness bilaterally.   PERIPHERAL VASCULAR SYSTEM: Good palpable distal pulses.   CENTRAL NERVOUS SYSTEM: No focal motor or sensory deficits noted.   SKIN: Skin without lesions, moist, well perfused.   MUSCLE STRENGTH & TONE: No noteable weakness, atrophy or abnormal movement.       Significant Labs: Results for MILLER HOPPER (MRN [id number]) as of 11/28/2019 10:16   Ref. Range 11/27/2019 12:38   WBC Latest Ref Range: 3.90 - 12.70 K/uL 8.99   RBC Latest Ref Range: 4.00 - 5.40 M/uL 4.67   Hemoglobin Latest Ref Range: 12.0 - 16.0 g/dL 10.9 (L)   Hematocrit Latest Ref Range: 37.0 - 48.5 % 38.1   MCV Latest Ref Range: 82 - 98 fL 82   MCH Latest Ref Range: 27.0 - 31.0 pg 23.3 (L)   MCHC Latest Ref Range: 32.0 - 36.0 g/dL 28.6 (L)   RDW Latest Ref Range: 11.5 - 14.5 % 16.7 (H)   Platelets Latest Ref Range: 150 - 350 K/uL 242   MPV Latest Ref Range: 9.2 - 12.9 fL 10.3   Gran% Latest Ref Range: 38.0 - 73.0 % 78.6 (H)   Gran # (ANC) Latest Ref Range: 1.8 - 7.7 K/uL 7.1   Lymph% Latest Ref Range: 18.0 - 48.0 % 14.1 (L)   Lymph # Latest Ref Range: 1.0 - 4.8 K/uL 1.3   Mono% Latest Ref Range: 4.0 - 15.0 % 3.9 (L)    Mono # Latest Ref Range: 0.3 - 1.0 K/uL 0.4   Eosinophil% Latest Ref Range: 0.0 - 8.0 % 2.3   Eos # Latest Ref Range: 0.0 - 0.5 K/uL 0.2   Basophil% Latest Ref Range: 0.0 - 1.9 % 0.2   Baso # Latest Ref Range: 0.00 - 0.20 K/uL 0.02   nRBC Latest Ref Range: 0 /100 WBC 0   Differential Method Unknown Automated   Immature Grans (Abs) Latest Ref Range: 0.00 - 0.04 K/uL 0.08 (H)   Immature Granulocytes Latest Ref Range: 0.0 - 0.5 % 0.9 (H)     Results for MILLER HOPPER (MRN [id number]) as of 11/28/2019 10:16   Ref. Range 11/28/2019 04:05   Sodium Latest Ref Range: 136 - 145 mmol/L 139   Potassium Latest Ref Range: 3.5 - 5.1 mmol/L 5.4 (H)   Chloride Latest Ref Range: 95 - 110 mmol/L 97   CO2 Latest Ref Range: 23 - 29 mmol/L 33 (H)   Anion Gap Latest Ref Range: 8 - 16 mmol/L 9   BUN, Bld Latest Ref Range: 6 - 20 mg/dL 26 (H)   Creatinine Latest Ref Range: 0.5 - 1.4 mg/dL 0.9   eGFR if non African American Latest Ref Range: >60 mL/min/1.73 m^2 >60.0   eGFR if African American Latest Ref Range: >60 mL/min/1.73 m^2 >60.0   Glucose Latest Ref Range: 70 - 110 mg/dL 329 (H)   Calcium Latest Ref Range: 8.7 - 10.5 mg/dL 9.0   Hemoglobin A1C External Latest Ref Range: 4.5 - 6.2 % 8.0 (H)   Estimated Avg Glucose Latest Ref Range: 68 - 131 mg/dL 183 (H)     Significant Imaging:  EXAMINATION:  XR CHEST AP PORTABLE    CLINICAL HISTORY:  Chest pain    COMPARISON:  August 2019    FINDINGS:  The heart is enlarged.  The mediastinum is unremarkable for AP portable technique.  Pulmonary vasculature is prominent, similar to the prior study, with no evidence of juan miguel congestive failure.  No pleural effusions are demonstrated.  Osseous structures are unremarkable.      Impression       1. Cardiomegaly with prominence of the pulmonary vasculature but no evidence of juan miguel congestive failure.  2. No other significant findings.      Electronically signed by: Gerald Potter MD  Date: 11/27/2019  Time: 12:49         I HAVE REVIEWED :    The vital  signs, nurses notes, and all the pertinent radiology and labs.     Assessment and Plan:     1. AFIB- Currently SR - CHADS- 2  2. COPD exacerbation  3. HTN  4. DM    PLAN:  Change Amlodipine to Cardizem 120 daily.  This will help manage the blood pressure as well as control rhythm.  The atrial fibrillation episode appears to be brief.  She is currently in sinus rhythm  Because of this I would recommend NOAC given her CHADS2 score of being high.  Once here lungs are cleared up she will need further investigation from the heart standpoint as an outpatient.   She can follow up cardiologist of her choice or in the office in 2 weeks time.  She may find a cardiologist closer to her home.  Discussed care with the nursing staff.  Thank you for the consultation.        Active Diagnoses:    Diagnosis Date Noted POA    Paroxysmal atrial fibrillation with rapid ventricular response [I48.0] 11/27/2019 Unknown    Acute on chronic respiratory failure with hypoxia and hypercapnia [J96.21, J96.22] 08/13/2019 Unknown    Dyspnea [R06.00] 08/13/2019 Yes    Mild persistent asthma without complication [J45.30] 07/25/2017 Yes    Obstructive sleep apnea syndrome [G47.33] 07/25/2017 Yes    Morbid obesity [E66.01] 07/25/2017 Yes      Problems Resolved During this Admission:           VTE Risk Mitigation (From admission, onward)         Ordered     enoxaparin injection 40 mg  Every 12 hours      11/27/19 1952     IP VTE HIGH RISK PATIENT  Once      11/27/19 1956     Place TERRANCE hose  Until discontinued      11/27/19 1956                Nataliia Acosta NP  Cardiology   [facility]      I have personally interviewed and examined the patient, I have reviewed the Nurse Practitioner's history and physical, assessment, and plan. I have personally evaluated the patient at bedside and agree with the findings.

## 2019-11-28 NOTE — PROGRESS NOTES
Renal Dosing of Medication  LOVENOX  An order has been entered by a pharmacist to adjust the dose and/or frequency of the medication listed below based on the patient's renal function.    Objective:  59 y.o., female, Actual Body Weight = 117.9 kg (260 lb)    Current Regimen: LOVENOX     Assessment:  Estimated Creatinine Clearance: 68.8 mL/min (based on SCr of 1.1 mg/dL).  Renal function is stable but  BMI= 47.6   Plan:  Orders have been entered to adjust the dose and/or frequency based on the patient's weight and renal function: 40 mg IV every 12 hours.    Thank you for allowing us to participate in this patient's care.     Tonya Douglass 11/27/2019 7:55 PM  Department of Pharmacy  Ext [phone]

## 2019-11-28 NOTE — ASSESSMENT & PLAN NOTE
Patient admitted with acute respiratory failure with respiratory distress  At the moment she is on BiPAP and feeling much better

## 2019-11-28 NOTE — PROGRESS NOTES
"[facility]  Progress Note    Patient Name: Liset Bailon  MRN: [id number]  Patient Class: IP- Inpatient   Admission Date: 11/27/2019  Length of Stay: 1 days  Attending Physician: Kenny Rodgers MD  Primary Care Provider: Primary Doctor No        Subjective:     Principal Problem:Acute on chronic respiratory failure with hypoxia and hypercapnia        HPI:  59 year old female presented to ED from her Pulmonologist's office for acute on chronic respiratory failure. She was hypercapnic with hypoxemia. Her ABG (personally reviewed) upon presentation 7.36/62/59/35. Patient placed on BiPAP. Patient denies any anginal chest discomfort. She is chronically SOB. Wears CPAP at home. Denies orthopnea/PND. CXR personally reviewed shows cardiomegaly, but no infiltrate. EKG (personally reviewed) showed a fib with RVR. However the patient converted to sinus rhythm and stated that she has never had "an irregular heart beat". Patient personally discussed with ED physician, who stated that he had discussed the patient with her Pulmonologist and that ICU admission for titration of BiPAP was warrented    Overview/Hospital Course:  Patient seen in ICU today  She is in better spirits  She denies any chest pain or shortness of breath  Rhythm right now is sinus    Interval History:     Review of Systems   Constitutional: Positive for fatigue. Negative for activity change and appetite change.   HENT: Negative for congestion and dental problem.    Eyes: Negative for discharge and itching.   Respiratory: Negative for shortness of breath.    Cardiovascular: Negative for chest pain.   Gastrointestinal: Negative for abdominal distention and abdominal pain.   Endocrine: Negative for cold intolerance.   Genitourinary: Negative for difficulty urinating and dysuria.   Musculoskeletal: Negative for arthralgias and back pain.   Skin: Negative for color change.   Neurological: Negative for dizziness and facial asymmetry. "   Hematological: Negative for adenopathy.   Psychiatric/Behavioral: Negative for agitation and behavioral problems.     Objective:     Vital Signs (Most Recent):  Temp: 97.6 °F (36.4 °C) (11/28/19 1200)  Pulse: 90 (11/28/19 1353)  Resp: (!) 28 (11/28/19 1353)  BP: (!) 150/70 (11/28/19 1300)  SpO2: 95 % (11/28/19 1353) Vital Signs (24h Range):  Temp:  [97.6 °F (36.4 °C)-98.6 °F (37 °C)] 97.6 °F (36.4 °C)  Pulse:  [66-99] 90  Resp:  [18-45] 28  SpO2:  [83 %-100 %] 95 %  BP: (115-150)/(58-75) 150/70     Weight: 127.5 kg (281 lb 1.4 oz)  Body mass index is 51.41 kg/m².    Intake/Output Summary (Last 24 hours) at 11/28/2019 1519  Last data filed at 11/28/2019 0301  Gross per 24 hour   Intake 360 ml   Output --   Net 360 ml      Physical Exam   Constitutional: She is oriented to person, place, and time. No distress.   Eyes: Pupils are equal, round, and reactive to light. EOM are normal.   Neck: Neck supple.   Cardiovascular: Normal rate.   Pulmonary/Chest: Effort normal and breath sounds normal.   Abdominal: Soft. Bowel sounds are normal.   Morbidly obese   Musculoskeletal: Normal range of motion. She exhibits no edema.   Neurological: She is alert and oriented to person, place, and time.   Skin: Skin is warm.   Psychiatric: She has a normal mood and affect.   Nursing note and vitals reviewed.      Significant Labs:   CBC:   Recent Labs   Lab 11/27/19  1238 11/28/19  1350   WBC 8.99  --    HGB 10.9*  --    HCT 38.1 36     --      CMP:   Recent Labs   Lab 11/27/19  1238 11/28/19  0405    139   K 4.3 5.4*   CL 97 97   CO2 32* 33*   * 329*   BUN 31* 26*   CREATININE 1.1 0.9   CALCIUM 9.0 9.0   PROT 7.6  --    ALBUMIN 3.8  --    BILITOT 0.9  --    ALKPHOS 79  --    AST 13  --    ALT 12  --    ANIONGAP 10 9   EGFRNONAA 55.9* >60.0       Significant Imaging: I have reviewed all pertinent imaging results/findings within the past 24 hours.      Assessment/Plan:      * Acute on chronic respiratory failure with  hypoxia and hypercapnia  Patient admitted with acute respiratory failure with respiratory distress  At the moment she is on BiPAP and feeling much better    Paroxysmal atrial fibrillation with rapid ventricular response  New onset of atrial fibrillation.  Right now rhythm is sinus  Started on NOAC as per Cardiology recommendation    Obesity hypoventilation syndrome  Need therapeutic lifestyle changes      Type 2 diabetes mellitus, with long-term current use of insulin  On insulin regimen at the moment        Morbid obesity  Need therapeutic lifestyle changes    Obstructive sleep apnea syndrome  Patient is a she is on CPAP at the moment  Per patient she was told she may need BiPAP    Mild persistent asthma without complication  History of asthma  Stable issue      VTE Risk Mitigation (From admission, onward)         Ordered     apixaban tablet 5 mg  2 times daily      11/28/19 1404     IP VTE HIGH RISK PATIENT  Once      11/27/19 1956     Place TERRANCE hose  Until discontinued      11/27/19 1956                      Solomon Sethi MD  Department of Hospital Medicine   [facility]

## 2019-11-28 NOTE — ASSESSMENT & PLAN NOTE
New onset of atrial fibrillation.  Right now rhythm is sinus  Started on NOAC as per Cardiology recommendation

## 2019-11-28 NOTE — PROGRESS NOTES
Progress Note  PULMONARY    Admit Date: 11/27/2019 11/28/2019      SUBJECTIVE:     Nov 28- chr hypoxic resp failure,ospas, presented yesterdady with worse hypoxia and a fib/rvr.  No new c/o.  Wearing bipap device 32%- looks fine, no c/o.          PFSH and Allergies reviewed.    OBJECTIVE:     Vitals (Most recent):  Vitals:    11/28/19 1014   BP: 124/64   Pulse:    Resp:    Temp:        Vitals (24 hour range):  Temp:  [97.8 °F (36.6 °C)-98.6 °F (37 °C)]   Pulse:  [66-99]   Resp:  [18-45]   BP: (115-161)/(58-75)   SpO2:  [83 %-100 %]       Intake/Output Summary (Last 24 hours) at 11/28/2019 1139  Last data filed at 11/28/2019 0301  Gross per 24 hour   Intake 360 ml   Output --   Net 360 ml          Physical Exam:  The patient's neuro status (alertness,orientation,cognitive function,motor skills,), pharyngeal exam (oral lesions, hygiene, abn dentition,), Neck (jvd,mass,thyroid,nodes in neck and above/below clavicle),RESPIRATORY(symmetry,effort,fremitus,percussion,auscultation),  Cor(rhythm,heart tones including gallops,perfusion,edema)ABD(distention,hepatic&splenomegaly,tenderness,masses), Skin(rash,cyanosis),Psyc(affect,judgement,).  Exam negative except for these pertinent findings:    Morbid, alert, chest is symmetric, no distress, normal percussion, normal fremitus and good normal breath sounds  Now nsr.    Radiographs reviewed: view by direct vision - cxr 11/27 chf, cta 8/12/19- lg pulm art, chf/poor resolution with motion artifact.    8/13/19 echo  Conclusion     · Concentric left ventricular hypertrophy.  · Normal left ventricular systolic function. The estimated ejection fraction is 68%  · Normal LV diastolic function.  · Low normal right ventricular systolic function.       No results found for this or any previous visit.]    Labs     Recent Labs   Lab 11/27/19  1238 11/28/19  0405   WBC 8.99  --    HGB 10.9*  --    HCT 38.1  --      --    HGBA1C  --  8.0*     Recent Labs   Lab 11/27/19  5122  11/27/19  1826 11/28/19  0405     --  139   K 4.3  --  5.4*   CL 97  --  97   CO2 32*  --  33*   BUN 31*  --  26*   CREATININE 1.1  --  0.9   *  --  329*   CALCIUM 9.0  --  9.0   MG 1.9  --   --    AST 13  --   --    ALT 12  --   --    ALKPHOS 79  --   --    BILITOT 0.9  --   --    PROT 7.6  --   --    ALBUMIN 3.8  --   --    TROPONINI <0.030 <0.030  --    BNP 22  --   --      Recent Labs   Lab 11/27/19  1524   PH 7.364   PCO2 62.4*   PO2 59*   HCO3 35.6*     Microbiology Results (last 7 days)     ** No results found for the last 168 hours. **          Impression:  Active Hospital Problems    Diagnosis  POA    Obesity hypoventilation syndrome [E66.2]  Unknown    Paroxysmal atrial fibrillation with rapid ventricular response [I48.0]  Unknown    Acute on chronic respiratory failure with hypoxia and hypercapnia [J96.21, J96.22]  Unknown    Dyspnea [R06.00]  Yes    Mild persistent asthma without complication [J45.30]  Yes    Obstructive sleep apnea syndrome [G47.33]  Yes    Morbid obesity [E66.01]  Yes      Resolved Hospital Problems   No resolved problems to display.               Plan:     Nov 28- had severe hypercapnea, uses cpap, component obesity hypoventilation likely - pt get rather irate when discussed?  Will place on cpap  And if abg f/u is good consider outpt and resume her home cpap 10 with Dr Guzmán f/u to optimize.  She declines recent cpap titration.  afib converted.    She had mild co2 retention in aug co2 49.  Niv/bipap likely more optimal.    Check abg 2 hrs and if co2 not elevated consider outpt on her home cpap.                                      .

## 2019-11-28 NOTE — CARE UPDATE
11/27/19 1524   Patient Assessment/Suction   Level of Consciousness (AVPU) alert   Respiratory Effort Mild;Labored   PRE-TX-O2   O2 Device (Oxygen Therapy) BiPAP   Oxygen Concentration (%) 35   SpO2 (!) 92 %   Pulse 89   Resp (!) 32   Preset CPAP/BiPAP Settings   Mode Of Delivery BiPAP   Ipap 20   EPAP (cm H2O) 8   Pressure Support (cm H2O) 12   Set Rate (Breaths/Min) 20   Labs   $ Was an ABG obtained? Arterial Puncture;ISTAT - Blood gas   $ Labs Tech Time 15 min   Pt demanded to be taken off of Bipap for ABG, placed back on Bipap per Dr. Rodgers due to high CO2 in ABG results.

## 2019-11-28 NOTE — PLAN OF CARE
11/27/19 2028   Patient Assessment/Suction   Level of Consciousness (AVPU) alert   Respiratory Effort Unlabored   Expansion/Accessory Muscles/Retractions no use of accessory muscles   All Lung Fields Breath Sounds diminished   Rhythm/Pattern, Respiratory assisted mechanically   Cough Frequency infrequent   PRE-TX-O2   O2 Device (Oxygen Therapy) BiPAP   Oxygen Concentration (%) 35   SpO2 99 %   Pulse Oximetry Type Continuous   $ Pulse Oximetry - Multiple Charge Pulse Oximetry - Multiple   Pulse 75   Resp (!) 24   Aerosol Therapy   $ Aerosol Therapy Charges Aerosol Treatment   Daily Review of Necessity (SVN) completed   Respiratory Treatment Status (SVN) given   Treatment Route (SVN) mouthpiece   Patient Position (SVN) HOB elevated   Post Treatment Assessment (SVN) breath sounds unchanged   Signs of Intolerance (SVN) none   Breath Sounds Post-Respiratory Treatment   Post-treatment Heart Rate (beats/min) 65   Post-treatment Resp Rate (breaths/min) 20   Equipment Change   $ RT Equipment Treatment nebuilzer;Aerosol treatment mask   Ready to Wean/Extubation Screen   FIO2<=50 (chart decimal) 0.35   Preset CPAP/BiPAP Settings   Mode Of Delivery BiPAP   $ Is patient using? Yes   Size of Mask Other (see comments)   Sized Appropriately? Yes   Equipment Type V60   Airway Device Type nasal prongs/pillows   Humidifier not applicable   Ipap 20   EPAP (cm H2O) 8   Pressure Support (cm H2O) 12   Set Rate (Breaths/Min) 20   ITime (sec) 0.9   Rise Time (sec) 3   Patient CPAP/BiPAP Settings   Low (L/Min) 3   IPAP Rise Time (sec) 3   RR Total (Breaths/Min) 24   Tidal Volume (mL) 385   VE Minute Ventilation (L/min) 5.4 L/min   Peak Inspiratory Pressure (cm H2O) 20   TiTOT (%) 34   Total Leak (L/Min) 52   Patient Trigger - ST Mode Only (%) 29   CPAP/BiPAP Alarms   Minute Ventilation (L/Min) 3   High RR (breaths/min) 40   Low RR (breaths/min) 10

## 2019-11-28 NOTE — PLAN OF CARE
Problem: Diabetes Comorbidity  Goal: Blood Glucose Level Within Desired Range  Outcome: Ongoing, Progressing  Intervention: Maintain Glycemic Control  Flowsheets (Taken 11/28/2019 0931)  Glycemic Management: blood glucose monitoring  Recommend switching to insulin Detemir due to prolonged Hyperglycemia on Insulin Aspart.  Problem: Oral Intake Inadequate  Goal: Improved Oral Intake  Outcome: Ongoing, Progressing  Intervention: Promote and Optimize Oral Intake  Flowsheets (Taken 11/28/2019 0931)  Oral Nutrition Promotion: -- (Diet restrictions)   Added Cardiac Diet restrictions due to Hx of HTN and HLD.

## 2019-11-28 NOTE — PROGRESS NOTES
"[facility]  Adult Nutrition   Progress Note (Initial Assessment)     SUMMARY     Recommendations/Interventions:  1. Added Cardiac Diet restrictions due to Hx of HTN and HLD.   2. Recommend switching to insulin Detemir due to prolonged Hyperglycemia on Insulin Aspart.  3. Reiterated the importance of following a Heart Healthy/Diabetic Diet. Pt did not want to hear anything I wanted to say about diet. She said that she has "been educated on this multiple times and thats not why im in here". Provided education materials and contact information should questions arise.  Goals:   1. Pt to meet at least 75% of estimated needs via PO intake of meals.   2. Insulin regimen changed to be considered due to prolonged Hyperglycemia.  3. Pt to show understanding of heart healthy/diabetic diet and the importance of compliance-NOT MET  Nutrition Goal Status: new    Reason for Assessment    Reason For Assessment: (ICU status )  Diagnosis: pulmonary disease  Relevant Medical History: Respiratory failure, T2DM, HLD, HTN, thyroid disease  Interdisciplinary Rounds: did not attend    Nutrition Risk Screen    Nutrition Risk Screen: no indicators present    Nutrition/Diet History    Patient Reported Diet/Restrictions/Preferences: general  Food Allergies: NKFA  Factors Affecting Nutritional Intake: None identified at this time    Anthropometrics    Temp: 98.6 °F (37 °C)  Height Method: Stated  Height: 5' 2" (157.5 cm)  Height (inches): 62 in  Weight Method: Bed Scale  Weight: 127.5 kg (281 lb 1.4 oz)  Weight (lb): 281.09 lb  Ideal Body Weight (IBW), Female: 110 lb  % Ideal Body Weight, Female (lb): 255.54 lb  BMI (Calculated): 51.4  BMI Grade: greater than 40 - morbid obesity       Weight History:  Wt Readings from Last 10 Encounters:   11/27/19 127.5 kg (281 lb 1.4 oz)   11/27/19 123.4 kg (272 lb)   08/13/19 122 kg (269 lb)   08/13/19 122 kg (269 lb)   06/20/19 122 kg (269 lb)   01/11/19 120 kg (264 lb 7.1 oz)   10/02/18 " 113.4 kg (250 lb)   07/25/17 112.5 kg (248 lb)   05/26/17 114.4 kg (252 lb 1.5 oz)     Lab/Procedures/Meds: Pertinent Labs Reviewed  Clinical Chemistry:  Recent Labs   Lab 11/27/19  1238 11/28/19  0405    139   K 4.3 5.4*   CL 97 97   CO2 32* 33*   * 329*   BUN 31* 26*   CREATININE 1.1 0.9   CALCIUM 9.0 9.0   PROT 7.6  --    ALBUMIN 3.8  --    BILITOT 0.9  --    ALKPHOS 79  --    AST 13  --    ALT 12  --    ANIONGAP 10 9   ESTGFRAFRICA >60.0 >60.0   EGFRNONAA 55.9* >60.0   MG 1.9  --      CBC:   Recent Labs   Lab 11/27/19  1238   WBC 8.99   RBC 4.67   HGB 10.9*   HCT 38.1      MCV 82   MCH 23.3*   MCHC 28.6*     Cardiac Profile:  Recent Labs   Lab 11/27/19  1238 11/27/19  1826   BNP 22  --    TROPONINI <0.030 <0.030     Diabetes:  Recent Labs   Lab 11/28/19  0405   HGBA1C 8.0*     Thyroid & Parathyroid:  Recent Labs   Lab 11/27/19  1826   TSH 3.470       Medications: Pertinent Medications reviewed  Scheduled Meds:   albuterol-ipratropium  3 mL Nebulization Q6H WAKE    amLODIPine  2.5 mg Oral Daily    aspirin  81 mg Oral Daily    atorvastatin  20 mg Oral QHS    enoxaparin  40 mg Subcutaneous Q12H    ergocalciferol  8,000 Units Oral Daily    fluticasone furoate-vilanterol  1 puff Inhalation Daily    gabapentin  300 mg Oral TID    hydroCHLOROthiazide  25 mg Oral Daily    irbesartan  300 mg Oral Daily    levoFLOXacin  750 mg Oral Daily    levothyroxine  25 mcg Oral Before breakfast    methylPREDNISolone sodium succinate  40 mg Intravenous Q8H    pantoprazole  40 mg Oral Daily     Continuous Infusions:  PRN Meds:.dextrose 50%, dextrose 50%, glucagon (human recombinant), glucose, glucose, insulin aspart U-100    Estimated/Assessed Needs    Weight Used For Calorie Calculations: 127.5 kg (281 lb 1.4 oz)  Energy Calorie Requirements (kcal): 2050 kcals/day (20 kcals/kg- 500 kcals)     Protein Requirements: 125 g/day (2.5 g/kg IBW)  Weight Used For Protein Calculations: 49.9 kg (110 lb)      Estimated Fluid Requirement Method: RDA Method    Nutrition Prescription Ordered    Current Diet Order: 1800 Kcal Diabetic Diet     Evaluation of Received Nutrient/Fluid Intake    Energy Calories Required: not meeting needs  Protein Required: not meeting needs  Fluid Required: meeting needs  Tolerance: tolerating  % Intake of Estimated Energy Needs: 50 - 75 %  % Meal Intake: 50 - 75 %    Intake/Output Summary (Last 24 hours) at 11/28/2019 0925  Last data filed at 11/28/2019 0301  Gross per 24 hour   Intake 360 ml   Output --   Net 360 ml      Nutrition Risk    Level of Risk/Frequency of Follow-up: moderate - high     Dietitian Rounds Brief:  · Seen 2/2 ICU status and per Dr. Rodgers recommending diet consult in chart. Pt admitted for worsening SOB and oxygen levels declined. No issues with decreased appetite or intake prior to admit. Pt stated she tries to follow a diet at home, however facial expression state otherwise. No issues with N/V/D,swallowing, chewing. Did not want education. Left materials and contact information.    Monitor and Evaluation    Food and Nutrient Intake: energy intake, food and beverage intake  Food and Nutrient Adminstration: diet order  Knowledge/Beliefs/Attitudes: food and nutrition knowledge/skill, beliefs and attitudes  Physical Activity and Function: nutrition-related ADLs and IADLs  Anthropometric Measurements: weight, weight change  Biochemical Data, Medical Tests and Procedures: electrolyte and renal panel, lipid profile, gastrointestinal profile, glucose/endocrine profile, inflammatory profile  Nutrition-Focused Physical Findings: overall appearance     Nutrition Follow-Up    RD Follow-up?: Yes  Dione Cabral RD 11/28/2019 9:31 AM

## 2019-11-29 PROBLEM — J45.30 MILD PERSISTENT ASTHMA WITHOUT COMPLICATION: Status: RESOLVED | Noted: 2017-07-25 | Resolved: 2019-11-29

## 2019-11-29 PROBLEM — I48.0 PAROXYSMAL ATRIAL FIBRILLATION WITH RAPID VENTRICULAR RESPONSE: Status: RESOLVED | Noted: 2019-11-27 | Resolved: 2019-11-29

## 2019-11-29 PROBLEM — J44.1 COPD EXACERBATION: Status: ACTIVE | Noted: 2019-11-29

## 2019-11-29 PROBLEM — J96.21 ACUTE ON CHRONIC RESPIRATORY FAILURE WITH HYPOXIA AND HYPERCAPNIA: Status: RESOLVED | Noted: 2019-08-13 | Resolved: 2019-11-29

## 2019-11-29 PROBLEM — J96.22 ACUTE ON CHRONIC RESPIRATORY FAILURE WITH HYPOXIA AND HYPERCAPNIA: Status: RESOLVED | Noted: 2019-08-13 | Resolved: 2019-11-29

## 2019-11-29 LAB
ALBUMIN SERPL BCP-MCNC: 3.9 G/DL (ref 3.5–5.2)
ALLENS TEST: ABNORMAL
ALP SERPL-CCNC: 73 U/L (ref 55–135)
ALT SERPL W/O P-5'-P-CCNC: 12 U/L (ref 10–44)
ANION GAP SERPL CALC-SCNC: 9 MMOL/L (ref 8–16)
AST SERPL-CCNC: 11 U/L (ref 10–40)
BILIRUB SERPL-MCNC: 0.7 MG/DL (ref 0.1–1)
BUN SERPL-MCNC: 34 MG/DL (ref 6–20)
CALCIUM SERPL-MCNC: 9.5 MG/DL (ref 8.7–10.5)
CHLORIDE SERPL-SCNC: 96 MMOL/L (ref 95–110)
CO2 SERPL-SCNC: 33 MMOL/L (ref 23–29)
CREAT SERPL-MCNC: 1 MG/DL (ref 0.5–1.4)
DELSYS: ABNORMAL
ERYTHROCYTE [DISTWIDTH] IN BLOOD BY AUTOMATED COUNT: 16 % (ref 11.5–14.5)
EST. GFR  (AFRICAN AMERICAN): >60 ML/MIN/1.73 M^2
EST. GFR  (NON AFRICAN AMERICAN): >60 ML/MIN/1.73 M^2
FLOW: 3
GLUCOSE SERPL-MCNC: 310 MG/DL (ref 70–110)
GLUCOSE SERPL-MCNC: 356 MG/DL (ref 70–110)
GLUCOSE SERPL-MCNC: 389 MG/DL (ref 70–110)
GLUCOSE SERPL-MCNC: 397 MG/DL (ref 70–110)
GLUCOSE SERPL-MCNC: 400 MG/DL (ref 70–110)
GLUCOSE SERPL-MCNC: 466 MG/DL (ref 70–110)
HCO3 UR-SCNC: 33 MMOL/L (ref 24–28)
HCT VFR BLD AUTO: 36.2 % (ref 37–48.5)
HCT VFR BLD CALC: 39 %PCV (ref 36–54)
HGB BLD-MCNC: 10.2 G/DL (ref 12–16)
MCH RBC QN AUTO: 22.7 PG (ref 27–31)
MCHC RBC AUTO-ENTMCNC: 28.2 G/DL (ref 32–36)
MCV RBC AUTO: 81 FL (ref 82–98)
MODE: ABNORMAL
PCO2 BLDA: 55.6 MMHG (ref 35–45)
PH SMN: 7.38 [PH] (ref 7.35–7.45)
PLATELET # BLD AUTO: 253 K/UL (ref 150–350)
PMV BLD AUTO: 10.7 FL (ref 9.2–12.9)
PO2 BLDA: 90 MMHG (ref 80–100)
POC BE: 8 MMOL/L
POC IONIZED CALCIUM: 1.28 MMOL/L (ref 1.06–1.42)
POC SATURATED O2: 97 % (ref 95–100)
POC TCO2: 35 MMOL/L (ref 23–27)
POTASSIUM BLD-SCNC: 4.7 MMOL/L (ref 3.5–5.1)
POTASSIUM SERPL-SCNC: 5.1 MMOL/L (ref 3.5–5.1)
PROT SERPL-MCNC: 7.1 G/DL (ref 6–8.4)
RBC # BLD AUTO: 4.49 M/UL (ref 4–5.4)
SAMPLE: ABNORMAL
SITE: ABNORMAL
SODIUM BLD-SCNC: 136 MMOL/L (ref 136–145)
SODIUM SERPL-SCNC: 138 MMOL/L (ref 136–145)
SP02: 98
WBC # BLD AUTO: 10.34 K/UL (ref 3.9–12.7)

## 2019-11-29 PROCEDURE — 84295 ASSAY OF SERUM SODIUM: CPT

## 2019-11-29 PROCEDURE — 82330 ASSAY OF CALCIUM: CPT

## 2019-11-29 PROCEDURE — 25000003 PHARM REV CODE 250: Performed by: INTERNAL MEDICINE

## 2019-11-29 PROCEDURE — 85014 HEMATOCRIT: CPT

## 2019-11-29 PROCEDURE — 82962 GLUCOSE BLOOD TEST: CPT

## 2019-11-29 PROCEDURE — 27000221 HC OXYGEN, UP TO 24 HOURS

## 2019-11-29 PROCEDURE — 85027 COMPLETE CBC AUTOMATED: CPT

## 2019-11-29 PROCEDURE — 21400001 HC TELEMETRY ROOM

## 2019-11-29 PROCEDURE — 94761 N-INVAS EAR/PLS OXIMETRY MLT: CPT

## 2019-11-29 PROCEDURE — 84132 ASSAY OF SERUM POTASSIUM: CPT

## 2019-11-29 PROCEDURE — 25000003 PHARM REV CODE 250

## 2019-11-29 PROCEDURE — 94640 AIRWAY INHALATION TREATMENT: CPT

## 2019-11-29 PROCEDURE — 94660 CPAP INITIATION&MGMT: CPT

## 2019-11-29 PROCEDURE — 25000242 PHARM REV CODE 250 ALT 637 W/ HCPCS: Performed by: INTERNAL MEDICINE

## 2019-11-29 PROCEDURE — 82803 BLOOD GASES ANY COMBINATION: CPT

## 2019-11-29 PROCEDURE — 99900035 HC TECH TIME PER 15 MIN (STAT)

## 2019-11-29 PROCEDURE — 36600 WITHDRAWAL OF ARTERIAL BLOOD: CPT

## 2019-11-29 PROCEDURE — 36415 COLL VENOUS BLD VENIPUNCTURE: CPT

## 2019-11-29 PROCEDURE — 63600175 PHARM REV CODE 636 W HCPCS: Performed by: INTERNAL MEDICINE

## 2019-11-29 PROCEDURE — 80053 COMPREHEN METABOLIC PANEL: CPT

## 2019-11-29 RX ADMIN — PANTOPRAZOLE SODIUM 40 MG: 40 TABLET, DELAYED RELEASE ORAL at 08:11

## 2019-11-29 RX ADMIN — LEVOFLOXACIN 750 MG: 750 TABLET, FILM COATED ORAL at 08:11

## 2019-11-29 RX ADMIN — ATORVASTATIN CALCIUM 20 MG: 20 TABLET, FILM COATED ORAL at 09:11

## 2019-11-29 RX ADMIN — CHLORHEXIDINE GLUCONATE 15 ML: 1.2 RINSE ORAL at 09:11

## 2019-11-29 RX ADMIN — GABAPENTIN 300 MG: 300 CAPSULE ORAL at 03:11

## 2019-11-29 RX ADMIN — INSULIN ASPART 10 UNITS: 100 INJECTION, SOLUTION INTRAVENOUS; SUBCUTANEOUS at 11:11

## 2019-11-29 RX ADMIN — MUPIROCIN: 20 OINTMENT TOPICAL at 09:11

## 2019-11-29 RX ADMIN — IPRATROPIUM BROMIDE AND ALBUTEROL SULFATE 3 ML: .5; 3 SOLUTION RESPIRATORY (INHALATION) at 03:11

## 2019-11-29 RX ADMIN — METHYLPREDNISOLONE SODIUM SUCCINATE 40 MG: 40 INJECTION, POWDER, FOR SOLUTION INTRAMUSCULAR; INTRAVENOUS at 10:11

## 2019-11-29 RX ADMIN — INSULIN ASPART 10 UNITS: 100 INJECTION, SOLUTION INTRAVENOUS; SUBCUTANEOUS at 04:11

## 2019-11-29 RX ADMIN — DILTIAZEM HYDROCHLORIDE 120 MG: 120 CAPSULE, COATED, EXTENDED RELEASE ORAL at 08:11

## 2019-11-29 RX ADMIN — MUPIROCIN: 20 OINTMENT TOPICAL at 08:11

## 2019-11-29 RX ADMIN — IPRATROPIUM BROMIDE AND ALBUTEROL SULFATE 3 ML: .5; 3 SOLUTION RESPIRATORY (INHALATION) at 09:11

## 2019-11-29 RX ADMIN — INSULIN ASPART 8 UNITS: 100 INJECTION, SOLUTION INTRAVENOUS; SUBCUTANEOUS at 08:11

## 2019-11-29 RX ADMIN — APIXABAN 5 MG: 5 TABLET, FILM COATED ORAL at 08:11

## 2019-11-29 RX ADMIN — IRBESARTAN 300 MG: 150 TABLET ORAL at 08:11

## 2019-11-29 RX ADMIN — GABAPENTIN 300 MG: 300 CAPSULE ORAL at 09:11

## 2019-11-29 RX ADMIN — LEVOTHYROXINE SODIUM 25 MCG: 25 TABLET ORAL at 08:11

## 2019-11-29 RX ADMIN — INSULIN ASPART 5 UNITS: 100 INJECTION, SOLUTION INTRAVENOUS; SUBCUTANEOUS at 09:11

## 2019-11-29 RX ADMIN — CHLORHEXIDINE GLUCONATE 15 ML: 1.2 RINSE ORAL at 08:11

## 2019-11-29 RX ADMIN — FLUTICASONE FUROATE AND VILANTEROL TRIFENATATE 1 PUFF: 100; 25 POWDER RESPIRATORY (INHALATION) at 09:11

## 2019-11-29 RX ADMIN — HYDROCHLOROTHIAZIDE 25 MG: 25 TABLET ORAL at 08:11

## 2019-11-29 RX ADMIN — IPRATROPIUM BROMIDE AND ALBUTEROL SULFATE 3 ML: .5; 3 SOLUTION RESPIRATORY (INHALATION) at 07:11

## 2019-11-29 RX ADMIN — METHYLPREDNISOLONE SODIUM SUCCINATE 40 MG: 40 INJECTION, POWDER, FOR SOLUTION INTRAMUSCULAR; INTRAVENOUS at 03:11

## 2019-11-29 RX ADMIN — APIXABAN 5 MG: 5 TABLET, FILM COATED ORAL at 09:11

## 2019-11-29 RX ADMIN — GABAPENTIN 300 MG: 300 CAPSULE ORAL at 08:11

## 2019-11-29 RX ADMIN — ASPIRIN 81 MG: 81 TABLET, COATED ORAL at 08:11

## 2019-11-29 RX ADMIN — METHYLPREDNISOLONE SODIUM SUCCINATE 40 MG: 40 INJECTION, POWDER, FOR SOLUTION INTRAMUSCULAR; INTRAVENOUS at 08:11

## 2019-11-29 NOTE — CARE UPDATE
Talked to Diana CHICAS about bipap and josee said to send all the documents. There was no order put in. Then talked to Yandy BRASHER and she said Dr. De La Cruz told her that she needs a trilogy because of her high CO2 levels. Called Dr. Sethi who then talked to Dr. De La Cruz who wants the Trilogy. That she would qualify for aTrilogy. Talked to Diana CHICAS and there needs to be an authorization for that from insurance company and that won't happen till Monday. Called [facility] and talked to Marilia who said same thing. Told Dr. Sethi what documents are needed for Trilogy and is supposed to work on that but still will not be able to get approved till Monday.

## 2019-11-29 NOTE — NURSING
Dr. De La Cruz on unit. Informed that pt discharge is being delayed at this time as respiratory unable to adjust pt home CPAP to 10 ccmH20 as they state the machine is too old. Dr. De La Cruz states pt may qualify for trilogy. Spoke to Izzy Rowley, , and informed that Dr. De La Cruz suggests pt should go home with trilogy. States she will notify Dr. Sethi.

## 2019-11-29 NOTE — CARE UPDATE
Order was put in for Bipap. Called [facility] and was told by Kim that in order to qualify for a Bipap, the patient needs a new sleep study done.

## 2019-11-29 NOTE — PLAN OF CARE
Pt tolerated off BiPap throughout most of shift. Sat up at bedside without complaints. CM consulted for d/c planning.

## 2019-11-29 NOTE — PROGRESS NOTES
Interval History:   Patient is showing some improvement.  She is less short of breath unless tachycardia noted.  Blood pressure has been stable tolerating diltiazem.        Review of Systems     Denies Chest pain,or palpitations currently on BiPAP machine  No recent fever, cough chills or congestion  No blood in the urine or stool  No myalgias  No recent arm, neck, or jaw pain  No lightheadedness or dizziness  No Double vision, blurry, vision or headache     Objective:     Vital Signs (Most Recent):  Temp: 97.6 °F (36.4 °C) (11/29/19 1105)  Pulse: 80 (11/29/19 1100)  Resp: 16 (11/29/19 1100)  BP: 139/80 (11/29/19 1100)  SpO2: 95 % (11/29/19 1100) Vital Signs (24h Range):  Temp:  [97.6 °F (36.4 °C)-98.8 °F (37.1 °C)] 97.6 °F (36.4 °C)  Pulse:  [66-98] 80  Resp:  [16-46] 16  SpO2:  [81 %-97 %] 95 %  BP: (132-161)/(60-80) 139/80     Weight: 127.5 kg (281 lb 1.4 oz)  Body mass index is 51.41 kg/m².     SpO2: 95 %  O2 Device (Oxygen Therapy): BiPAP      Intake/Output Summary (Last 24 hours) at 11/29/2019 1212  Last data filed at 11/29/2019 0900  Gross per 24 hour   Intake 2240 ml   Output --   Net 2240 ml       Lines/Drains/Airways     Peripheral Intravenous Line                 Peripheral IV - Single Lumen 11/27/19 1238 20 G Right Antecubital 1 day         Peripheral IV - Single Lumen 11/27/19 1917 20 G Left Hand 1 day                   albuterol-ipratropium  3 mL Nebulization Q6H WAKE    apixaban  5 mg Oral BID    aspirin  81 mg Oral Daily    atorvastatin  20 mg Oral QHS    chlorhexidine  15 mL Mouth/Throat BID    diltiaZEM  120 mg Oral Daily    ergocalciferol  8,000 Units Oral Daily    fluticasone furoate-vilanterol  1 puff Inhalation Daily    gabapentin  300 mg Oral TID    hydroCHLOROthiazide  25 mg Oral Daily    irbesartan  300 mg Oral Daily    levoFLOXacin  750 mg Oral Daily    levothyroxine  25 mcg Oral Before breakfast    methylPREDNISolone sodium succinate  40 mg Intravenous Q8H    mupirocin    Nasal BID    pantoprazole  40 mg Oral Daily         PHYSICAL EXAM:  Constitutional: Well built, well nourished, overweight female  Neck: no carotid bruit, no JVD  Lungs: diminished breath sounds bibasilar and bilaterally, rhonchi bilaterally  Chest Wall: no tenderness  Heart: regular rate and rhythm, S1, S2 normal, no murmur, click, rub or gallop and regular rate and rhythm  Abdomen: soft, morbidly obese non-tender; bowel sounds normal; no masses,   Extremities: Extremities normal, atraumatic, no cyanosis, clubbing, or edema  Skin: Skin color, texture, turgor normal. No rashes or lesions  Neuro: Alert and responsive. Grossly non focal    I HAVE REVIEWED :    The vital signs, nurses notes, and all the pertinent radiology and labs.       Significant Labs:   Recent Lab Results       11/29/19  1122   11/29/19  0823   11/29/19  0343   11/28/19  2126   11/28/19  1724        Albumin     3.9         Alkaline Phosphatase     73         Allens Test               ALT     12         Anion Gap     9         AST     11         BILIRUBIN TOTAL     0.7  Comment:  For infants and newborns, interpretation of results should be based  on gestational age, weight and in agreement with clinical  observations.  Premature Infant recommended reference ranges:  Up to 24 hours.............<8.0 mg/dL  Up to 48 hours............<12.0 mg/dL  3-5 days..................<15.0 mg/dL  6-29 days.................<15.0 mg/dL           Site               BUN, Bld     34         Calcium     9.5         Chloride     96         CO2     33         Creatinine     1.0         DelSys               eGFR if      >60.0         eGFR if non      >60.0  Comment:  Calculation used to obtain the estimated glomerular filtration  rate (eGFR) is the CKD-EPI equation.            FiO2               Glucose     397         Hematocrit     36.2         Hemoglobin     10.2         MCH     22.7         MCHC     28.2         MCV     81         Min  Vol               Mode               MPV     10.7         PEEP               Platelets     253         POC BE               POC Glucose 400 310   401 317     POC HCO3               POC Hematocrit               POC Ionized Calcium               POC PCO2               POC PH               POC PO2               POC Potassium               POC SATURATED O2               POC Sodium               POC TCO2               Potassium     5.1         PROTEIN TOTAL     7.1         RBC     4.49         RDW     16.0         Sample               Sodium     138         Sp02               Spont Rate               WBC     10.34                          11/28/19  1350        Albumin       Alkaline Phosphatase       Allens Test Pass     ALT       Anion Gap       AST       BILIRUBIN TOTAL       Site RR     BUN, Bld       Calcium       Chloride       CO2       Creatinine       DelSys CPAP/BiPAP     eGFR if        eGFR if non        FiO2 30     Glucose       Hematocrit       Hemoglobin       MCH       MCHC       MCV       Min Vol 8     Mode CPAP     MPV       PEEP 10     Platelets       POC BE 6     POC Glucose 379     POC HCO3 31.9     POC Hematocrit 36     POC Ionized Calcium 1.23     POC PCO2 58.8     POC PH 7.343     POC PO2 95     POC Potassium 4.8     POC SATURATED O2 97     POC Sodium 136     POC TCO2 34     Potassium       PROTEIN TOTAL       RBC       RDW       Sample ARTERIAL     Sodium       Sp02 95     Spont Rate 26     WBC             Significant Imaging:   Imaging Results          X-Ray Chest AP Portable (Final result)  Result time 11/27/19 12:49:35    Final result by Gerald Potter MD (11/27/19 12:49:35)                 Impression:      1. Cardiomegaly with prominence of the pulmonary vasculature but no evidence of juan miguel congestive failure.  2. No other significant findings.      Electronically signed by: Gerald Potter MD  Date:    11/27/2019  Time:    12:49             Narrative:     EXAMINATION:  XR CHEST AP PORTABLE    CLINICAL HISTORY:  Chest pain    COMPARISON:  August 2019    FINDINGS:  The heart is enlarged.  The mediastinum is unremarkable for AP portable technique.  Pulmonary vasculature is prominent, similar to the prior study, with no evidence of juan miguel congestive failure.  No pleural effusions are demonstrated.  Osseous structures are unremarkable.                                    I HAVE REVIEWED :    The vital signs, nurses notes, and all the pertinent radiology and labs.       ASSESSMENT & PLAN:    1.  Paroxysmal atrial fibrillation now in sinus rhythm  2.  Acute respiratory failure with severe hypoxemia and hypercapnia  3.  Morbid obesity  4.  Obstructive sleep apnea  5.  Essential hypertension  6.  Hypothyroidism    RECOMMENDATIONS:    Patient seems to be responding fairly well to this therapy.  Continue diltiazem CD 1 20 mg a day amlodipine has been discontinued.  However because of high HS score and comorbid conditions recommend to continue on apixaban 5 mg p.o. daily.  In the absence of known coronary artery disease or stroke symptoms aspirin can be discontinued.  Regarding hypertension management a valsartan can be continued at 300 mg a day along with other medications to include levothyroxine pantoprazole and methylprednisolone.               I have personally interviewed and examined the patient.  Discussed the care plan with the patient and  at bedside along with the nursing staff.  When discharge patient has expressed wishes to follow up in the office here in [place].

## 2019-11-29 NOTE — CARE UPDATE
11/28/19 2028   Patient Assessment/Suction   Level of Consciousness (AVPU) alert   All Lung Fields Breath Sounds diminished   PRE-TX-O2   O2 Device (Oxygen Therapy) BiPAP   $ Is the patient on Low Flow Oxygen? Yes   Oxygen Concentration (%) 30   SpO2 (!) 93 %   Pulse Oximetry Type Continuous   $ Pulse Oximetry - Multiple Charge Pulse Oximetry - Multiple   Pulse 84   Resp (!) 32   Aerosol Therapy   $ Aerosol Therapy Charges Aerosol Treatment   Daily Review of Necessity (SVN) completed   Respiratory Treatment Status (SVN) given   Treatment Route (SVN) mouthpiece   Patient Position (SVN) HOB elevated   Post Treatment Assessment (SVN) breath sounds unchanged   Signs of Intolerance (SVN) none   Breath Sounds Post-Respiratory Treatment   Post-treatment Heart Rate (beats/min) 70   Post-treatment Resp Rate (breaths/min) 14   Ready to Wean/Extubation Screen   FIO2<=50 (chart decimal) 0.3   Preset CPAP/BiPAP Settings   Mode Of Delivery BiPAP   $ Is patient using? Yes   Sized Appropriately? Yes   Equipment Type V60   Airway Device Type nasal prongs/pillows   Ipap 15   EPAP (cm H2O) 5   Pressure Support (cm H2O) 10   Set Rate (Breaths/Min) 12   ITime (sec) 0.9   Rise Time (sec) 3

## 2019-11-29 NOTE — NURSING
"Spoke to Dr. Sethi.Updated on pt. Status. Informed no one in the respiratory dept can figure out how to increase pt home CPAP settings. Informed they state "they machine is too old." Informed pt feels uncomfortable going home at this time as she is unsure that her home CPAP is functioning appropriately. States he will hold off on discharge at this time until arrangements can be made to ensure that pt has what is needed set up at home prior to discharge. New orders received for transfer to telemetry.   "

## 2019-11-29 NOTE — NURSING
Spoke to Dr. Sethi.Informed pt daughter has adjusted pt home BiPap settings to 15/5, which is the settings pt is currently on here in hospital on hospital BiPap. Dr. Sethi states pt can sleep on home BiPap tonight and will recheck ABG in AM, then pt may discharge in AM if tolerates. Plan of care reviewed with pt and family.Verbalized understanding.

## 2019-11-29 NOTE — PLAN OF CARE
11/29/19 0920   Patient Assessment/Suction   Level of Consciousness (AVPU) alert   Respiratory Effort Normal;Unlabored   All Lung Fields Breath Sounds clear;diminished   PRE-TX-O2   O2 Device (Oxygen Therapy) BiPAP   $ Is the patient on Low Flow Oxygen? Yes   Flow (L/min) 3   Oxygen Concentration (%) 30   SpO2 (!) 92 %   Pulse Oximetry Type Continuous   $ Pulse Oximetry - Multiple Charge Pulse Oximetry - Multiple   Pulse 84   Resp 18   Aerosol Therapy   $ Aerosol Therapy Charges Aerosol Treatment   Daily Review of Necessity (SVN) completed   Respiratory Treatment Status (SVN) given   Treatment Route (SVN) mouthpiece;oxygen   Patient Position (SVN) sitting on edge of bed   Post Treatment Assessment (SVN) increased aeration   Signs of Intolerance (SVN) none   Inhaler   $ Inhaler Charges MDI (Metered Dose Inahler) Treatment   Daily Review of Necessity (Inhaler) completed   Respiratory Treatment Status (Inhaler) given;mouth rinsed post treatment   Treatment Route (Inhaler) mouthpiece   Patient Position (Inhaler) sitting on edge of bed   Post Treatment Assessment (Inhaler) breath sounds unchanged   Signs of Intolerance (Inhaler) none   Preset CPAP/BiPAP Settings   Mode Of Delivery BiPAP   $ CPAP/BiPAP Daily Charge BiPAP/CPAP Daily   $ Initial CPAP/BiPAP Setup? No   $ Is patient using? Yes   Size of Mask Other (see comments)   Sized Appropriately? Yes   Equipment Type V60   Airway Device Type nasal prongs/pillows   Humidifier not applicable   Ipap 15   EPAP (cm H2O) 5   Pressure Support (cm H2O) 10   Set Rate (Breaths/Min) 12   ITime (sec) 0.9   Rise Time (sec) 3   Patient CPAP/BiPAP Settings   Low (L/Min) 3   IPAP Rise Time (sec) 3   RR Total (Breaths/Min) 12   Tidal Volume (mL) 385   VE Minute Ventilation (L/min) 5.4 L/min

## 2019-11-30 PROBLEM — J96.22 ACUTE ON CHRONIC RESPIRATORY FAILURE WITH HYPOXIA AND HYPERCAPNIA: Status: RESOLVED | Noted: 2019-08-13 | Resolved: 2019-11-30

## 2019-11-30 PROBLEM — J96.21 ACUTE ON CHRONIC RESPIRATORY FAILURE WITH HYPOXIA AND HYPERCAPNIA: Status: RESOLVED | Noted: 2019-08-13 | Resolved: 2019-11-30

## 2019-11-30 LAB
ALBUMIN SERPL BCP-MCNC: 3.8 G/DL (ref 3.5–5.2)
ALLENS TEST: ABNORMAL
ALLENS TEST: ABNORMAL
ALP SERPL-CCNC: 71 U/L (ref 55–135)
ALT SERPL W/O P-5'-P-CCNC: 12 U/L (ref 10–44)
ANION GAP SERPL CALC-SCNC: 12 MMOL/L (ref 8–16)
AST SERPL-CCNC: 12 U/L (ref 10–40)
BILIRUB SERPL-MCNC: 0.8 MG/DL (ref 0.1–1)
BUN SERPL-MCNC: 41 MG/DL (ref 6–20)
CALCIUM SERPL-MCNC: 9.6 MG/DL (ref 8.7–10.5)
CHLORIDE SERPL-SCNC: 92 MMOL/L (ref 95–110)
CO2 SERPL-SCNC: 31 MMOL/L (ref 23–29)
CREAT SERPL-MCNC: 1.1 MG/DL (ref 0.5–1.4)
DELSYS: ABNORMAL
DELSYS: ABNORMAL
EP: 5
EP: 5
ERYTHROCYTE [DISTWIDTH] IN BLOOD BY AUTOMATED COUNT: 16.3 % (ref 11.5–14.5)
EST. GFR  (AFRICAN AMERICAN): >60 ML/MIN/1.73 M^2
EST. GFR  (NON AFRICAN AMERICAN): 55.9 ML/MIN/1.73 M^2
FIO2: 32
FIO2: 32
GLUCOSE SERPL-MCNC: 337 MG/DL (ref 70–110)
GLUCOSE SERPL-MCNC: 421 MG/DL (ref 70–110)
GLUCOSE SERPL-MCNC: 429 MG/DL (ref 70–110)
GLUCOSE SERPL-MCNC: 445 MG/DL (ref 70–110)
GLUCOSE SERPL-MCNC: 454 MG/DL (ref 70–110)
GLUCOSE SERPL-MCNC: 458 MG/DL (ref 70–110)
GLUCOSE SERPL-MCNC: 462 MG/DL (ref 70–110)
GLUCOSE SERPL-MCNC: 462 MG/DL (ref 70–110)
HCO3 UR-SCNC: 34.3 MMOL/L (ref 24–28)
HCO3 UR-SCNC: 35.7 MMOL/L (ref 24–28)
HCT VFR BLD AUTO: 35.6 % (ref 37–48.5)
HGB BLD-MCNC: 10.2 G/DL (ref 12–16)
IP: 15
IP: 15
MCH RBC QN AUTO: 22.8 PG (ref 27–31)
MCHC RBC AUTO-ENTMCNC: 28.7 G/DL (ref 32–36)
MCV RBC AUTO: 80 FL (ref 82–98)
MODE: ABNORMAL
MODE: ABNORMAL
PCO2 BLDA: 55.5 MMHG (ref 35–45)
PCO2 BLDA: 65 MMHG (ref 35–45)
PH SMN: 7.35 [PH] (ref 7.35–7.45)
PH SMN: 7.4 [PH] (ref 7.35–7.45)
PLATELET # BLD AUTO: 265 K/UL (ref 150–350)
PMV BLD AUTO: 10.2 FL (ref 9.2–12.9)
PO2 BLDA: 33 MMHG (ref 80–100)
PO2 BLDA: 87 MMHG (ref 80–100)
POC BE: 10 MMOL/L
POC BE: 9 MMOL/L
POC SATURATED O2: 57 % (ref 95–100)
POC SATURATED O2: 96 % (ref 95–100)
POC TCO2: 36 MMOL/L (ref 23–27)
POC TCO2: 38 MMOL/L (ref 23–27)
POTASSIUM SERPL-SCNC: 4.7 MMOL/L (ref 3.5–5.1)
PROT SERPL-MCNC: 7.3 G/DL (ref 6–8.4)
RBC # BLD AUTO: 4.48 M/UL (ref 4–5.4)
SAMPLE: ABNORMAL
SAMPLE: ABNORMAL
SITE: ABNORMAL
SITE: ABNORMAL
SODIUM SERPL-SCNC: 135 MMOL/L (ref 136–145)
SP02: 98
SP02: 98
WBC # BLD AUTO: 11.61 K/UL (ref 3.9–12.7)

## 2019-11-30 PROCEDURE — 99233 SBSQ HOSP IP/OBS HIGH 50: CPT | Mod: ,,, | Performed by: INTERNAL MEDICINE

## 2019-11-30 PROCEDURE — 82962 GLUCOSE BLOOD TEST: CPT

## 2019-11-30 PROCEDURE — 27000221 HC OXYGEN, UP TO 24 HOURS

## 2019-11-30 PROCEDURE — 25000242 PHARM REV CODE 250 ALT 637 W/ HCPCS: Performed by: INTERNAL MEDICINE

## 2019-11-30 PROCEDURE — 25000003 PHARM REV CODE 250: Performed by: INTERNAL MEDICINE

## 2019-11-30 PROCEDURE — 80053 COMPREHEN METABOLIC PANEL: CPT

## 2019-11-30 PROCEDURE — 94761 N-INVAS EAR/PLS OXIMETRY MLT: CPT

## 2019-11-30 PROCEDURE — 82947 ASSAY GLUCOSE BLOOD QUANT: CPT

## 2019-11-30 PROCEDURE — 63600175 PHARM REV CODE 636 W HCPCS: Performed by: INTERNAL MEDICINE

## 2019-11-30 PROCEDURE — 25000003 PHARM REV CODE 250

## 2019-11-30 PROCEDURE — 99900035 HC TECH TIME PER 15 MIN (STAT)

## 2019-11-30 PROCEDURE — 99233 PR SUBSEQUENT HOSPITAL CARE,LEVL III: ICD-10-PCS | Mod: ,,, | Performed by: INTERNAL MEDICINE

## 2019-11-30 PROCEDURE — 94640 AIRWAY INHALATION TREATMENT: CPT

## 2019-11-30 PROCEDURE — 36415 COLL VENOUS BLD VENIPUNCTURE: CPT

## 2019-11-30 PROCEDURE — C9399 UNCLASSIFIED DRUGS OR BIOLOG: HCPCS | Performed by: INTERNAL MEDICINE

## 2019-11-30 PROCEDURE — 82803 BLOOD GASES ANY COMBINATION: CPT

## 2019-11-30 PROCEDURE — 21400001 HC TELEMETRY ROOM

## 2019-11-30 PROCEDURE — 85027 COMPLETE CBC AUTOMATED: CPT

## 2019-11-30 PROCEDURE — 36600 WITHDRAWAL OF ARTERIAL BLOOD: CPT

## 2019-11-30 RX ORDER — INSULIN ASPART 100 [IU]/ML
15 INJECTION, SOLUTION INTRAVENOUS; SUBCUTANEOUS
Status: DISCONTINUED | OUTPATIENT
Start: 2019-11-30 | End: 2019-12-01 | Stop reason: HOSPADM

## 2019-11-30 RX ADMIN — GABAPENTIN 300 MG: 300 CAPSULE ORAL at 08:11

## 2019-11-30 RX ADMIN — PANTOPRAZOLE SODIUM 40 MG: 40 TABLET, DELAYED RELEASE ORAL at 08:11

## 2019-11-30 RX ADMIN — APIXABAN 5 MG: 5 TABLET, FILM COATED ORAL at 08:11

## 2019-11-30 RX ADMIN — INSULIN DETEMIR 15 UNITS: 100 INJECTION, SOLUTION SUBCUTANEOUS at 08:11

## 2019-11-30 RX ADMIN — GABAPENTIN 300 MG: 300 CAPSULE ORAL at 03:11

## 2019-11-30 RX ADMIN — IRBESARTAN 300 MG: 150 TABLET ORAL at 08:11

## 2019-11-30 RX ADMIN — INSULIN ASPART 4 UNITS: 100 INJECTION, SOLUTION INTRAVENOUS; SUBCUTANEOUS at 08:11

## 2019-11-30 RX ADMIN — MUPIROCIN: 20 OINTMENT TOPICAL at 08:11

## 2019-11-30 RX ADMIN — FLUTICASONE FUROATE AND VILANTEROL TRIFENATATE 1 PUFF: 100; 25 POWDER RESPIRATORY (INHALATION) at 02:11

## 2019-11-30 RX ADMIN — METHYLPREDNISOLONE SODIUM SUCCINATE 40 MG: 40 INJECTION, POWDER, FOR SOLUTION INTRAMUSCULAR; INTRAVENOUS at 08:11

## 2019-11-30 RX ADMIN — DILTIAZEM HYDROCHLORIDE 120 MG: 120 CAPSULE, COATED, EXTENDED RELEASE ORAL at 08:11

## 2019-11-30 RX ADMIN — HYDROCHLOROTHIAZIDE 25 MG: 25 TABLET ORAL at 08:11

## 2019-11-30 RX ADMIN — INSULIN ASPART 14 UNITS: 100 INJECTION, SOLUTION INTRAVENOUS; SUBCUTANEOUS at 11:11

## 2019-11-30 RX ADMIN — INSULIN DETEMIR 15 UNITS: 100 INJECTION, SOLUTION SUBCUTANEOUS at 09:11

## 2019-11-30 RX ADMIN — ATORVASTATIN CALCIUM 20 MG: 20 TABLET, FILM COATED ORAL at 08:11

## 2019-11-30 RX ADMIN — LEVOFLOXACIN 750 MG: 750 TABLET, FILM COATED ORAL at 08:11

## 2019-11-30 RX ADMIN — METHYLPREDNISOLONE SODIUM SUCCINATE 40 MG: 40 INJECTION, POWDER, FOR SOLUTION INTRAMUSCULAR; INTRAVENOUS at 05:11

## 2019-11-30 RX ADMIN — ASPIRIN 81 MG: 81 TABLET, COATED ORAL at 08:11

## 2019-11-30 RX ADMIN — IPRATROPIUM BROMIDE AND ALBUTEROL SULFATE 3 ML: .5; 3 SOLUTION RESPIRATORY (INHALATION) at 02:11

## 2019-11-30 RX ADMIN — LEVOTHYROXINE SODIUM 25 MCG: 25 TABLET ORAL at 05:11

## 2019-11-30 RX ADMIN — IPRATROPIUM BROMIDE AND ALBUTEROL SULFATE 3 ML: .5; 3 SOLUTION RESPIRATORY (INHALATION) at 07:11

## 2019-11-30 RX ADMIN — INSULIN ASPART 15 UNITS: 100 INJECTION, SOLUTION INTRAVENOUS; SUBCUTANEOUS at 04:11

## 2019-11-30 RX ADMIN — CHLORHEXIDINE GLUCONATE 15 ML: 1.2 RINSE ORAL at 08:11

## 2019-11-30 NOTE — ASSESSMENT & PLAN NOTE
Restarted in premeal lispro and added levemir 15 qhs  F/u SSI  Will optimize the regime before possible d/c tomorrow

## 2019-11-30 NOTE — PLAN OF CARE
11/29/19 1927   Patient Assessment/Suction   Respiratory Effort Unlabored   All Lung Fields Breath Sounds diminished   Rhythm/Pattern, Respiratory pattern regular   PRE-TX-O2   O2 Device (Oxygen Therapy) room air   SpO2 (!) 92 %   Pulse Oximetry Type Continuous   Pulse 80   Resp 16   Aerosol Therapy   $ Aerosol Therapy Charges Aerosol Treatment   Respiratory Treatment Status (SVN) given   Treatment Route (SVN) mouthpiece   Patient Position (SVN) sitting on edge of bed   Post Treatment Assessment (SVN) breath sounds improved   Signs of Intolerance (SVN) none   Breath Sounds Post-Respiratory Treatment   Throughout All Fields Post-Treatment All Fields   Throughout All Fields Post-Treatment aeration increased   Post-treatment Heart Rate (beats/min) 80   Post-treatment Resp Rate (breaths/min) 16

## 2019-11-30 NOTE — ASSESSMENT & PLAN NOTE
New onset of atrial fibrillation.  Right now rhythm is sinus  Started on NOAC as per Cardiology recommendation  Amlodipine was discontinued and patient was started on Cardizem 120 mg

## 2019-11-30 NOTE — PROGRESS NOTES
"[facility] Medicine  Progress Note    Patient Name: Liset Bailon  MRN: [id number]  Patient Class: IP- Inpatient   Admission Date: 11/27/2019  Length of Stay: 2 days  Attending Physician: Kenny Rodgers MD  Primary Care Provider: Primary Doctor No        Subjective:     Principal Problem:Acute on chronic respiratory failure with hypoxia and hypercapnia        HPI:  59 year old female presented to ED from her Pulmonologist's office for acute on chronic respiratory failure. She was hypercapnic with hypoxemia. Her ABG (personally reviewed) upon presentation 7.36/62/59/35. Patient placed on BiPAP. Patient denies any anginal chest discomfort. She is chronically SOB. Wears CPAP at home. Denies orthopnea/PND. CXR personally reviewed shows cardiomegaly, but no infiltrate. EKG (personally reviewed) showed a fib with RVR. However the patient converted to sinus rhythm and stated that she has never had "an irregular heart beat". Patient personally discussed with ED physician, who stated that he had discussed the patient with her Pulmonologist and that ICU admission for titration of BiPAP was warrented    Overview/Hospital Course:  Patient seen in ICU today  She is in better spirits  ABG done today is  Better  Patient in need of Trilogy per pulmonology MD  Per  it will take some days before her insurance and equipment company to arrange trilogy   Patients BiPAP setting here was 15/5 and her family members were able to adjust this same setting on her home BiPAP  Patient requesting 1 more day night in the hospital before getting discharged back to home, because she is worried she may come back soon      Interval History:     Review of Systems   Constitutional: Negative for activity change and appetite change.   HENT: Negative for congestion and dental problem.    Eyes: Negative for discharge and itching.   Respiratory: Negative for shortness of breath.    Cardiovascular: Negative for chest " pain.   Gastrointestinal: Negative for abdominal distention and abdominal pain.   Endocrine: Negative for cold intolerance.   Genitourinary: Negative for difficulty urinating and dysuria.   Musculoskeletal: Negative for arthralgias and back pain.   Skin: Negative for color change.   Neurological: Negative for dizziness and facial asymmetry.   Hematological: Negative for adenopathy.   Psychiatric/Behavioral: Negative for agitation and behavioral problems.     Objective:     Vital Signs (Most Recent):  Temp: 98.1 °F (36.7 °C) (11/29/19 1915)  Pulse: 78 (11/29/19 1930)  Resp: 16 (11/29/19 1927)  BP: (!) 158/66 (11/29/19 1900)  SpO2: 99 % (11/29/19 1930) Vital Signs (24h Range):  Temp:  [97.6 °F (36.4 °C)-98.8 °F (37.1 °C)] 98.1 °F (36.7 °C)  Pulse:  [66-97] 78  Resp:  [14-39] 16  SpO2:  [84 %-99 %] 99 %  BP: (139-161)/(65-80) 158/66     Weight: 127.5 kg (281 lb 1.4 oz)  Body mass index is 51.41 kg/m².    Intake/Output Summary (Last 24 hours) at 11/29/2019 1940  Last data filed at 11/29/2019 1400  Gross per 24 hour   Intake 1520 ml   Output --   Net 1520 ml      Physical Exam   Constitutional: She is oriented to person, place, and time. No distress.   Eyes: Pupils are equal, round, and reactive to light. EOM are normal.   Neck: Neck supple.   Cardiovascular: Normal rate.   Pulmonary/Chest: Effort normal and breath sounds normal.   Abdominal: Soft. Bowel sounds are normal.   Musculoskeletal: Normal range of motion. She exhibits no edema.   Neurological: She is alert and oriented to person, place, and time.   Skin: Skin is warm.   Psychiatric: She has a normal mood and affect.   Nursing note and vitals reviewed.      Significant Labs:   CBC:   Recent Labs   Lab 11/28/19  1350 11/29/19  0343 11/29/19  1249   WBC  --  10.34  --    HGB  --  10.2*  --    HCT 36 36.2* 39   PLT  --  253  --      CMP:   Recent Labs   Lab 11/28/19  0405 11/29/19  0343    138   K 5.4* 5.1   CL 97 96   CO2 33* 33*   * 397*   BUN 26*  34*   CREATININE 0.9 1.0   CALCIUM 9.0 9.5   PROT  --  7.1   ALBUMIN  --  3.9   BILITOT  --  0.7   ALKPHOS  --  73   AST  --  11   ALT  --  12   ANIONGAP 9 9   EGFRNONAA >60.0 >60.0       Significant Imaging: I have reviewed all pertinent imaging results/findings within the past 24 hours.      Assessment/Plan:      * Acute on chronic respiratory failure with hypoxia and hypercapnia  Patient admitted with acute respiratory failure with respiratory distress . (She was on CPAP settings  at home)  At the moment she is on BiPAP and feeling much better  ABG done today is  Better  Patient in need of/Qualify for , Trilogy per pulmonology MD  Per  it will take some days before her insurance and equipment company to arrange trilogy   Patients BiPAP setting here was 15/5 and her family members were able to adjust this same setting on her home BiPAP machine  Patient requesting 1 more day night in the hospital before getting discharged back to home, because she is worried she may come back soon  Will repeat ABG again tomorrow, continue current BiPAP settings  If patient is stable and okay with the pulmonologist, patient can go home with BiPAP versus Non Invasive Ventilator tomorrow    Paroxysmal atrial fibrillation  New onset of atrial fibrillation.  Right now rhythm is sinus  Started on NOAC as per Cardiology recommendation  Amlodipine was discontinued and patient was started on Cardizem 120 mg    Obesity hypoventilation syndrome  Need therapeutic lifestyle changes  Further plan as detailed above    Type 2 diabetes mellitus, with long-term current use of insulin  On insulin regimen at the moment        Morbid obesity  Need therapeutic lifestyle changes    Obstructive sleep apnea syndrome  Patient currently stable on BiPAP setting 15/5  She need sleep study again as an outpatient to get qualified for a new BiPAP machine      VTE Risk Mitigation (From admission, onward)         Ordered     apixaban tablet 5 mg  2  times daily      11/28/19 1404     IP VTE HIGH RISK PATIENT  Once      11/27/19 1956     Place TERRANCE hose  Until discontinued      11/27/19 1956                      Solomon Sethi MD  Department of Hospital Medicine   [facility]

## 2019-11-30 NOTE — PROGRESS NOTES
"[facility] Medicine  Progress Note    Patient Name: Liset Bailon  MRN: [id number]  Patient Class: IP- Inpatient   Admission Date: 11/27/2019  Length of Stay: 3 days  Attending Physician: Berna Conteh MD  Primary Care Provider: Primary Doctor No        Subjective:     Principal Problem:Acute on chronic respiratory failure with hypoxia and hypercapnia        HPI:  59 year old female presented to ED from her Pulmonologist's office for acute on chronic respiratory failure. She was hypercapnic with hypoxemia. Her ABG (personally reviewed) upon presentation 7.36/62/59/35. Patient placed on BiPAP. Patient denies any anginal chest discomfort. She is chronically SOB. Wears CPAP at home. Denies orthopnea/PND. CXR personally reviewed shows cardiomegaly, but no infiltrate. EKG (personally reviewed) showed a fib with RVR. However the patient converted to sinus rhythm and stated that she has never had "an irregular heart beat". Patient personally discussed with ED physician, who stated that he had discussed the patient with her Pulmonologist and that ICU admission for titration of BiPAP was warrented    Overview/Hospital Course:  Patient seen in ICU today  She is in better spirits  ABG done today is  Better  Patient in need of Trilogy per pulmonology MD  Per  it will take some days before her insurance and equipment company to arrange trilogy   Patients BiPAP setting here was 15/5 and her family members were able to adjust this same setting on her home BiPAP  Patient requesting 1 more day night in the hospital before getting discharged back to home, because she is worried she may come back soon      Interval History:   Pt was seen and examined at bedside  Denies SOB ,used home bipap with 15/5 ipap/epap and ABG showed marked improvement  BS uncontrolled, adjusted insulin dose      Review of Systems   10 point review of systems were found to be negative expect for that mentioned already in " interval history.     Objective:     Vital Signs (Most Recent):  Temp: 97.6 °F (36.4 °C) (11/30/19 1415)  Pulse: 74 (11/30/19 1421)  Resp: 13 (11/30/19 1421)  BP: (!) 150/72 (11/30/19 1415)  SpO2: (!) 93 % (11/30/19 1421) Vital Signs (24h Range):  Temp:  [97.5 °F (36.4 °C)-98.4 °F (36.9 °C)] 97.6 °F (36.4 °C)  Pulse:  [] 74  Resp:  [13-20] 13  SpO2:  [86 %-99 %] 93 %  BP: (121-165)/(63-96) 150/72     Weight: 127.5 kg (281 lb 1.4 oz)  Body mass index is 51.41 kg/m².    Intake/Output Summary (Last 24 hours) at 11/30/2019 1657  Last data filed at 11/30/2019 1500  Gross per 24 hour   Intake 1580 ml   Output --   Net 1580 ml      Physical Exam   Constitutional: She is oriented to person, place, and time. She appears well-nourished. No distress.   HENT:   Head: Atraumatic.   Eyes: Pupils are equal, round, and reactive to light.   Neck: Neck supple. No JVD present.   Cardiovascular: Normal rate and regular rhythm.   No murmur heard.  Pulmonary/Chest: Breath sounds normal. She has no wheezes. She has no rales.   Abdominal: Soft. Bowel sounds are normal. She exhibits no distension. There is no tenderness.   Musculoskeletal: She exhibits no edema.   Neurological: She is alert and oriented to person, place, and time.   Skin: Skin is warm and dry.   Psychiatric: She has a normal mood and affect.          Significant Labs:   BMP:   Recent Labs   Lab 11/30/19  0424 11/30/19  1023   * 462*   *  --    K 4.7  --    CL 92*  --    CO2 31*  --    BUN 41*  --    CREATININE 1.1  --    CALCIUM 9.6  --      CBC:   Recent Labs   Lab 11/29/19  0343 11/29/19  1249 11/30/19  0424   WBC 10.34  --  11.61   HGB 10.2*  --  10.2*   HCT 36.2* 39 35.6*     --  265           Assessment/Plan:      Obesity hypoventilation syndrome  Need therapeutic lifestyle changes  Further plan as detailed above    Paroxysmal atrial fibrillation  New onset of atrial fibrillation.  Right now rhythm is sinus  Started on NOAC as per Cardiology  recommendation  On  Cardizem 120 mg    Type 2 diabetes mellitus, with long-term current use of insulin  Restarted in premeal lispro and added levemir 15 qhs  F/u SSI  Will optimize the regime before possible d/c tomorrow        Morbid obesity  Need therapeutic lifestyle changes  Advice on bariatric sx    Obstructive sleep apnea syndrome  Patient currently stable on BiPAP setting 15/5        VTE Risk Mitigation (From admission, onward)         Ordered     apixaban tablet 5 mg  2 times daily      11/28/19 1404     IP VTE HIGH RISK PATIENT  Once      11/27/19 1956     Place ETRRANCE hose  Until discontinued      11/27/19 1956                      Berna Conteh MD  Department of Hospital Medicine   [facility]

## 2019-11-30 NOTE — PROGRESS NOTES
[facility]  Pulmonology  Progress Note    Subjective     No new complaints.   Review of Systems   Constitutional: Negative for fever, chills and night sweats.   Respiratory: Negative for cough, sputum production, shortness of breath and pleurisy.    Cardiovascular: Negative for chest pain, palpitations and leg swelling.   All other systems reviewed and are negative.       I have personally reviewed the following during today's evaluation:  past medical history, ROS, family history, social history, surgical history, current inpatient medications,drug allergies, vital signs over the past 24 hours, results of relevant diagnostic studies and nursing/provider documentation from the past 24 hours.     Objective     VS Temp:  [97.5 °F (36.4 °C)-98.4 °F (36.9 °C)]   Pulse:  []   Resp:  [14-22]   BP: (121-165)/(63-96)   SpO2:  [86 %-99 %]   Ideal body weight: 50.1 kg (110 lb 7.2 oz)  Adjusted ideal body weight: 81.1 kg (178 lb 11.3 oz)   I/O   Intake/Output Summary (Last 24 hours) at 11/30/2019 1107  Last data filed at 11/30/2019 1000  Gross per 24 hour   Intake 1820 ml   Output --   Net 1820 ml        Vent SpO2 (!) 94% on room air   PE Physical Exam   Constitutional: She is oriented to person, place, and time. She appears well-developed and well-nourished. No distress. She is obese.   HENT:   Head: Normocephalic.   Right Ear: External ear normal.   Left Ear: External ear normal.   Nose: Nose normal.   Neck: Normal range of motion. Neck supple. No JVD present.   Cardiovascular: Regular rhythm, normal heart sounds and intact distal pulses.   Pulmonary/Chest: Normal expansion, symmetric chest wall expansion and breath sounds normal.   Musculoskeletal: Normal range of motion. She exhibits no deformity.   Neurological: She is alert and oriented to person, place, and time.   Skin: Skin is warm and dry. No rash noted.   Psychiatric: She has a normal mood and affect. Her behavior is normal.   Nursing note and  vitals reviewed.      Labs I have personally reviewed and interpreted all labs / diagnostic studies obtained over the past 24 hours, and relevant results are as follows:  Recent Labs   Lab 11/30/19  0424  11/30/19  0632   WBC 11.61  --   --    RBC 4.48  --   --    HGB 10.2*  --   --    HCT 35.6*  --   --      --   --    MCV 80*  --   --    MCH 22.8*  --   --    MCHC 28.7*  --   --    *  --   --    K 4.7  --   --    CL 92*  --   --    CO2 31*  --   --    BUN 41*  --   --    CREATININE 1.1  --   --    ALT 12  --   --    AST 12  --   --    ALKPHOS 71  --   --    BILITOT 0.8  --   --    PROT 7.3  --   --    ALBUMIN 3.8  --   --    PH  --    < > 7.399   PCO2  --    < > 55.5*   PO2  --    < > 87   HCO3  --    < > 34.3*   POCSATURATED  --    < > 96   BE  --    < > 9    < > = values in this interval not displayed.      Imaging I have personally reviewed and interpreted the following images and reviewed the associated Radiology report.  No new images.     Micro I have personally reviewed and interpreted the available culture data.  Relevant results are as follows.  No new cultures.   Medications Scheduled    albuterol-ipratropium  3 mL Nebulization Q6H WAKE    apixaban  5 mg Oral BID    aspirin  81 mg Oral Daily    atorvastatin  20 mg Oral QHS    chlorhexidine  15 mL Mouth/Throat BID    diltiaZEM  120 mg Oral Daily    ergocalciferol  8,000 Units Oral Daily    fluticasone furoate-vilanterol  1 puff Inhalation Daily    gabapentin  300 mg Oral TID    hydroCHLOROthiazide  25 mg Oral Daily    insulin detemir U-100  15 Units Subcutaneous BID    irbesartan  300 mg Oral Daily    levoFLOXacin  750 mg Oral Daily    levothyroxine  25 mcg Oral Before breakfast    methylPREDNISolone sodium succinate  40 mg Intravenous Q8H    mupirocin   Nasal BID    pantoprazole  40 mg Oral Daily      Continuous Infusions:        PRN   dextrose 50%, dextrose 50%, glucagon (human recombinant), glucose, glucose, insulin aspart  U-100        Assessment       Active Hospital Problems    Diagnosis    Obesity hypoventilation syndrome    Paroxysmal atrial fibrillation    Type 2 diabetes mellitus, with long-term current use of insulin    Obstructive sleep apnea syndrome    Morbid obesity      My Impression:  At baseline and has been since yesterday.  Chronic respiratory failure secondary to morbid obesity.    Plan     Pulmonary  · Back at baseline.  · Discharge home on BiPAP.  · Needs to lose weight.  · Follow up with Dr. Abernathy as an outpatient.     Carl De La Cruz MD  Pulmonary / Critical Care Medicine  [facility]

## 2019-11-30 NOTE — ASSESSMENT & PLAN NOTE
Patient admitted with acute respiratory failure with respiratory distress . (She was on CPAP settings  at home)  At the moment she is on BiPAP and feeling much better  ABG done today is  Better  Patient in need of/Qualify for , Trilogy per pulmonology MD  Per  it will take some days before her insurance and equipment company to arrange trilogy   Patients BiPAP setting here was 15/5 and her family members were able to adjust this same setting on her home BiPAP machine  Patient requesting 1 more day night in the hospital before getting discharged back to home, because she is worried she may come back soon  Will repeat ABG again tomorrow, continue current BiPAP settings  If patient is stable and okay with the pulmonologist, patient can go home with BiPAP versus Non Invasive Ventilator tomorrow

## 2019-11-30 NOTE — ASSESSMENT & PLAN NOTE
New onset of atrial fibrillation.  Right now rhythm is sinus  Started on NOAC as per Cardiology recommendation  On  Cardizem 120 mg

## 2019-11-30 NOTE — ASSESSMENT & PLAN NOTE
Patient currently stable on BiPAP setting 15/5  She need sleep study again as an outpatient to get qualified for a new BiPAP machine

## 2019-11-30 NOTE — PLAN OF CARE
Pt stayed OOB throughout the day. Tolerated well. Remained on RA throughout shift with 02 sats WNL for pt. Pt has home BIPap set up at bedside for sleep. Pt BG levels remain >400 throughout the shift. Dr. Conteh aware and increased insulin pre meal dosage.

## 2019-11-30 NOTE — SUBJECTIVE & OBJECTIVE
Interval History:     Review of Systems   Constitutional: Negative for activity change and appetite change.   HENT: Negative for congestion and dental problem.    Eyes: Negative for discharge and itching.   Respiratory: Negative for shortness of breath.    Cardiovascular: Negative for chest pain.   Gastrointestinal: Negative for abdominal distention and abdominal pain.   Endocrine: Negative for cold intolerance.   Genitourinary: Negative for difficulty urinating and dysuria.   Musculoskeletal: Negative for arthralgias and back pain.   Skin: Negative for color change.   Neurological: Negative for dizziness and facial asymmetry.   Hematological: Negative for adenopathy.   Psychiatric/Behavioral: Negative for agitation and behavioral problems.     Objective:     Vital Signs (Most Recent):  Temp: 98.1 °F (36.7 °C) (11/29/19 1915)  Pulse: 78 (11/29/19 1930)  Resp: 16 (11/29/19 1927)  BP: (!) 158/66 (11/29/19 1900)  SpO2: 99 % (11/29/19 1930) Vital Signs (24h Range):  Temp:  [97.6 °F (36.4 °C)-98.8 °F (37.1 °C)] 98.1 °F (36.7 °C)  Pulse:  [66-97] 78  Resp:  [14-39] 16  SpO2:  [84 %-99 %] 99 %  BP: (139-161)/(65-80) 158/66     Weight: 127.5 kg (281 lb 1.4 oz)  Body mass index is 51.41 kg/m².    Intake/Output Summary (Last 24 hours) at 11/29/2019 1940  Last data filed at 11/29/2019 1400  Gross per 24 hour   Intake 1520 ml   Output --   Net 1520 ml      Physical Exam   Constitutional: She is oriented to person, place, and time. No distress.   Eyes: Pupils are equal, round, and reactive to light. EOM are normal.   Neck: Neck supple.   Cardiovascular: Normal rate.   Pulmonary/Chest: Effort normal and breath sounds normal.   Abdominal: Soft. Bowel sounds are normal.   Musculoskeletal: Normal range of motion. She exhibits no edema.   Neurological: She is alert and oriented to person, place, and time.   Skin: Skin is warm.   Psychiatric: She has a normal mood and affect.   Nursing note and vitals reviewed.      Significant Labs:    CBC:   Recent Labs   Lab 11/28/19  1350 11/29/19  0343 11/29/19  1249   WBC  --  10.34  --    HGB  --  10.2*  --    HCT 36 36.2* 39   PLT  --  253  --      CMP:   Recent Labs   Lab 11/28/19  0405 11/29/19  0343    138   K 5.4* 5.1   CL 97 96   CO2 33* 33*   * 397*   BUN 26* 34*   CREATININE 0.9 1.0   CALCIUM 9.0 9.5   PROT  --  7.1   ALBUMIN  --  3.9   BILITOT  --  0.7   ALKPHOS  --  73   AST  --  11   ALT  --  12   ANIONGAP 9 9   EGFRNONAA >60.0 >60.0       Significant Imaging: I have reviewed all pertinent imaging results/findings within the past 24 hours.

## 2019-11-30 NOTE — NURSING
"Spoke to Dr. Casillas.Informed that pt BG remains >400. Informed pt states "I take at least 30 units of novolog before each meal." Informed that pt does not believe current insulin sliding scale is enough for BG control at this time. New orders received.  "

## 2019-11-30 NOTE — SUBJECTIVE & OBJECTIVE
Interval History:   Pt was seen and examined at bedside  Denies SOB ,used home bipap with 15/5 ipap/epap and ABG showed marked improvement  BS uncontrolled, adjusted insulin dose      Review of Systems   10 point review of systems were found to be negative expect for that mentioned already in interval history.     Objective:     Vital Signs (Most Recent):  Temp: 97.6 °F (36.4 °C) (11/30/19 1415)  Pulse: 74 (11/30/19 1421)  Resp: 13 (11/30/19 1421)  BP: (!) 150/72 (11/30/19 1415)  SpO2: (!) 93 % (11/30/19 1421) Vital Signs (24h Range):  Temp:  [97.5 °F (36.4 °C)-98.4 °F (36.9 °C)] 97.6 °F (36.4 °C)  Pulse:  [] 74  Resp:  [13-20] 13  SpO2:  [86 %-99 %] 93 %  BP: (121-165)/(63-96) 150/72     Weight: 127.5 kg (281 lb 1.4 oz)  Body mass index is 51.41 kg/m².    Intake/Output Summary (Last 24 hours) at 11/30/2019 1657  Last data filed at 11/30/2019 1500  Gross per 24 hour   Intake 1580 ml   Output --   Net 1580 ml      Physical Exam   Constitutional: She is oriented to person, place, and time. She appears well-nourished. No distress.   HENT:   Head: Atraumatic.   Eyes: Pupils are equal, round, and reactive to light.   Neck: Neck supple. No JVD present.   Cardiovascular: Normal rate and regular rhythm.   No murmur heard.  Pulmonary/Chest: Breath sounds normal. She has no wheezes. She has no rales.   Abdominal: Soft. Bowel sounds are normal. She exhibits no distension. There is no tenderness.   Musculoskeletal: She exhibits no edema.   Neurological: She is alert and oriented to person, place, and time.   Skin: Skin is warm and dry.   Psychiatric: She has a normal mood and affect.          Significant Labs:   BMP:   Recent Labs   Lab 11/30/19  0424 11/30/19  1023   * 462*   *  --    K 4.7  --    CL 92*  --    CO2 31*  --    BUN 41*  --    CREATININE 1.1  --    CALCIUM 9.6  --      CBC:   Recent Labs   Lab 11/29/19  0343 11/29/19  1249 11/30/19  0424   WBC 10.34  --  11.61   HGB 10.2*  --  10.2*   HCT 36.2*  39 35.6*     --  344

## 2019-11-30 NOTE — ASSESSMENT & PLAN NOTE
· Back at baseline.  · Discharge home on BiPAP.  · Needs to lose weight.  · Follow up with Dr. Abernathy as an outpatient.

## 2019-12-01 VITALS
TEMPERATURE: 97 F | SYSTOLIC BLOOD PRESSURE: 143 MMHG | HEIGHT: 62 IN | WEIGHT: 281.06 LBS | BODY MASS INDEX: 51.72 KG/M2 | HEART RATE: 61 BPM | DIASTOLIC BLOOD PRESSURE: 80 MMHG | RESPIRATION RATE: 18 BRPM | OXYGEN SATURATION: 96 %

## 2019-12-01 LAB
ANION GAP SERPL CALC-SCNC: 13 MMOL/L (ref 8–16)
BUN SERPL-MCNC: 40 MG/DL (ref 6–20)
CALCIUM SERPL-MCNC: 9.5 MG/DL (ref 8.7–10.5)
CHLORIDE SERPL-SCNC: 92 MMOL/L (ref 95–110)
CO2 SERPL-SCNC: 32 MMOL/L (ref 23–29)
CREAT SERPL-MCNC: 0.9 MG/DL (ref 0.5–1.4)
EST. GFR  (AFRICAN AMERICAN): >60 ML/MIN/1.73 M^2
EST. GFR  (NON AFRICAN AMERICAN): >60 ML/MIN/1.73 M^2
GLUCOSE SERPL-MCNC: 355 MG/DL (ref 70–110)
POTASSIUM SERPL-SCNC: 4.3 MMOL/L (ref 3.5–5.1)
SODIUM SERPL-SCNC: 137 MMOL/L (ref 136–145)

## 2019-12-01 PROCEDURE — 82962 GLUCOSE BLOOD TEST: CPT

## 2019-12-01 PROCEDURE — 36415 COLL VENOUS BLD VENIPUNCTURE: CPT

## 2019-12-01 PROCEDURE — C9399 UNCLASSIFIED DRUGS OR BIOLOG: HCPCS | Performed by: INTERNAL MEDICINE

## 2019-12-01 PROCEDURE — 25000003 PHARM REV CODE 250

## 2019-12-01 PROCEDURE — 25000003 PHARM REV CODE 250: Performed by: INTERNAL MEDICINE

## 2019-12-01 PROCEDURE — 63600175 PHARM REV CODE 636 W HCPCS: Performed by: INTERNAL MEDICINE

## 2019-12-01 PROCEDURE — 80048 BASIC METABOLIC PNL TOTAL CA: CPT

## 2019-12-01 RX ORDER — PREDNISONE 20 MG/1
40 TABLET ORAL ONCE
Status: COMPLETED | OUTPATIENT
Start: 2019-12-01 | End: 2019-12-01

## 2019-12-01 RX ORDER — DILTIAZEM HYDROCHLORIDE 120 MG/1
120 CAPSULE, COATED, EXTENDED RELEASE ORAL DAILY
Qty: 30 CAPSULE | Refills: 0 | Status: SHIPPED | OUTPATIENT
Start: 2019-12-02 | End: 2020-12-01

## 2019-12-01 RX ADMIN — MUPIROCIN: 20 OINTMENT TOPICAL at 09:12

## 2019-12-01 RX ADMIN — PREDNISONE 40 MG: 20 TABLET ORAL at 09:12

## 2019-12-01 RX ADMIN — DILTIAZEM HYDROCHLORIDE 120 MG: 120 CAPSULE, COATED, EXTENDED RELEASE ORAL at 09:12

## 2019-12-01 RX ADMIN — LEVOFLOXACIN 750 MG: 750 TABLET, FILM COATED ORAL at 09:12

## 2019-12-01 RX ADMIN — INSULIN ASPART 15 UNITS: 100 INJECTION, SOLUTION INTRAVENOUS; SUBCUTANEOUS at 07:12

## 2019-12-01 RX ADMIN — GABAPENTIN 300 MG: 300 CAPSULE ORAL at 09:12

## 2019-12-01 RX ADMIN — IRBESARTAN 300 MG: 150 TABLET ORAL at 09:12

## 2019-12-01 RX ADMIN — APIXABAN 5 MG: 5 TABLET, FILM COATED ORAL at 09:12

## 2019-12-01 RX ADMIN — PANTOPRAZOLE SODIUM 40 MG: 40 TABLET, DELAYED RELEASE ORAL at 09:12

## 2019-12-01 RX ADMIN — HYDROCHLOROTHIAZIDE 25 MG: 25 TABLET ORAL at 09:12

## 2019-12-01 RX ADMIN — CHLORHEXIDINE GLUCONATE 15 ML: 1.2 RINSE ORAL at 09:12

## 2019-12-01 RX ADMIN — LEVOTHYROXINE SODIUM 25 MCG: 25 TABLET ORAL at 05:12

## 2019-12-01 RX ADMIN — INSULIN DETEMIR 15 UNITS: 100 INJECTION, SOLUTION SUBCUTANEOUS at 07:12

## 2019-12-01 RX ADMIN — ASPIRIN 81 MG: 81 TABLET, COATED ORAL at 09:12

## 2019-12-01 NOTE — PLAN OF CARE
11/30/19 1911   Patient Assessment/Suction   Respiratory Effort Unlabored   All Lung Fields Breath Sounds clear   Rhythm/Pattern, Respiratory pattern regular   PRE-TX-O2   O2 Device (Oxygen Therapy) room air   SpO2 (!) 94 %   Pulse Oximetry Type Continuous   Pulse 80   Resp 16   Aerosol Therapy   $ Aerosol Therapy Charges Aerosol Treatment   Respiratory Treatment Status (SVN) given   Treatment Route (SVN) mouthpiece   Patient Position (SVN) sitting on edge of bed   Post Treatment Assessment (SVN) breath sounds unchanged   Signs of Intolerance (SVN) none   Breath Sounds Post-Respiratory Treatment   Throughout All Fields Post-Treatment All Fields   Throughout All Fields Post-Treatment clear   Post-treatment Heart Rate (beats/min) 82   Post-treatment Resp Rate (breaths/min) 16

## 2019-12-01 NOTE — DISCHARGE INSTRUCTIONS
Continue to use home Bipap at current setting IPAP/EPAP 15/5  Return to ED if any symptoms of acute respiratory distress ,or lethargies  C/w Eliqis and diltizazem for new afib and 4 days of prednisone

## 2019-12-01 NOTE — NURSING
Pt up and ambulatory. Gait steady and even. Skin wd color normal.  resp even unlabored on room air.  Discharge inst reviewed with pt and .  dced pt per wc in comp of  to prvt transport pt nabeel well.

## 2019-12-01 NOTE — PROGRESS NOTES
Interval History: Ms. Bailon is doing much better. She is on room air and eager to go home. She has maintained SR.        Review of Systems     Denies Chest pain, sob, or palpitations  No recent fever, cough chills or congestion  No blood in the urine or stool  No myalgias  No recent arm, neck, or jaw pain  No lightheadedness or dizziness  No Double vision, blurry, vision or headache     Objective:     Vital Signs (Most Recent):  Temp: 96.8 °F (36 °C) (12/01/19 0745)  Pulse: 61 (12/01/19 0800)  Resp: 18 (12/01/19 0745)  BP: (!) 143/80 (12/01/19 0800)  SpO2: 96 % (12/01/19 0800) Vital Signs (24h Range):  Temp:  [96.8 °F (36 °C)-98.2 °F (36.8 °C)] 96.8 °F (36 °C)  Pulse:  [60-86] 61  Resp:  [13-23] 18  SpO2:  [92 %-96 %] 96 %  BP: (135-150)/(63-80) 143/80     Weight: 127.5 kg (281 lb 1.4 oz)  Body mass index is 51.41 kg/m².     SpO2: 96 %  O2 Device (Oxygen Therapy): BiPAP(patient home)      Intake/Output Summary (Last 24 hours) at 12/1/2019 1253  Last data filed at 12/1/2019 0600  Gross per 24 hour   Intake 720 ml   Output --   Net 720 ml       Lines/Drains/Airways     None                    albuterol-ipratropium  3 mL Nebulization Q6H WAKE    apixaban  5 mg Oral BID    atorvastatin  20 mg Oral QHS    chlorhexidine  15 mL Mouth/Throat BID    diltiaZEM  120 mg Oral Daily    ergocalciferol  8,000 Units Oral Daily    fluticasone furoate-vilanterol  1 puff Inhalation Daily    gabapentin  300 mg Oral TID    hydroCHLOROthiazide  25 mg Oral Daily    insulin aspart U-100  15 Units Subcutaneous TIDWM    insulin detemir U-100  15 Units Subcutaneous BID    irbesartan  300 mg Oral Daily    levothyroxine  25 mcg Oral Before breakfast    mupirocin   Nasal BID    pantoprazole  40 mg Oral Daily         PHYSICAL EXAM:  Constitutional: Well built, well nourished  Neck: no carotid bruit, no JVD, supple, symmetrical, trachea midline and hepatojugular reflux  Lungs: diminished breath sounds bibasilar and bilaterally,  rhonchi bilaterally  Chest Wall: no tenderness  Heart: regular rate and rhythm, S1, S2 normal, no murmur, click, rub or gallop and regular rate and rhythm  Abdomen: soft, non-tender; bowel sounds normal; no masses,  no organomegaly  Extremities: Extremities normal, atraumatic, no cyanosis, clubbing, or edema  Skin: Skin color, texture, turgor normal. No rashes or lesions  Neuro: Alert and responsive. Grossly non focal    I HAVE REVIEWED :    The vital signs, nurses notes, and all the pertinent radiology and labs.       Significant Labs:   Results for MILLER HOPPER (MRN [id number]) as of 12/1/2019 12:53   Ref. Range 12/1/2019 03:48   Sodium Latest Ref Range: 136 - 145 mmol/L 137   Potassium Latest Ref Range: 3.5 - 5.1 mmol/L 4.3   Chloride Latest Ref Range: 95 - 110 mmol/L 92 (L)   CO2 Latest Ref Range: 23 - 29 mmol/L 32 (H)   Anion Gap Latest Ref Range: 8 - 16 mmol/L 13   BUN, Bld Latest Ref Range: 6 - 20 mg/dL 40 (H)   Creatinine Latest Ref Range: 0.5 - 1.4 mg/dL 0.9   eGFR if non African American Latest Ref Range: >60 mL/min/1.73 m^2 >60.0   eGFR if African American Latest Ref Range: >60 mL/min/1.73 m^2 >60.0   Glucose Latest Ref Range: 70 - 110 mg/dL 355 (H)   Calcium Latest Ref Range: 8.7 - 10.5 mg/dL 9.5            I HAVE REVIEWED :    The vital signs, nurses notes, and all the pertinent radiology and labs.       ASSESSMENT & PLAN:    1.  Paroxysmal atrial fibrillation now in sinus rhythm  2.  Acute respiratory failure with severe hypoxemia and hypercapnia  3.  Morbid obesity  4.  Obstructive sleep apnea  5.  Essential hypertension  6.  Hypothyroidism     Plan;  From a cardiac standpoint she may be discharged home on Diltiazem and Eliquis at present dosing  She can follow up in the office in 2 weeks time for further work up   Thank you     I have personally interviewed and examined the patient, I have reviewed the Nurse Practitioner's history and physical, assessment, and plan. I have personally evaluated the  patient at bedside and agree with the findings.

## 2019-12-02 NOTE — ASSESSMENT & PLAN NOTE
Blood sugar control improved  Advised to resume her home medications for the diabetes upon discharge

## 2019-12-02 NOTE — ASSESSMENT & PLAN NOTE
New onset of atrial fibrillation.  Right now rhythm is sinus  Started on Eliquis 5 mg daily as per Cardiology recommendation  On  Cardizem 120 mg for rate control

## 2019-12-02 NOTE — ASSESSMENT & PLAN NOTE
Patient currently stable on BiPAP setting 15/5  Patient can continue to use her home BiPAP machine

## 2019-12-02 NOTE — DISCHARGE SUMMARY
"[facility] Medicine  Discharge Summary      Patient Name: Liset Bailon  MRN: [id number]  Admission Date: 11/27/2019  Hospital Length of Stay: 4 days  Discharge Date and Time:  12/01/2019 6:32 PM  Attending Physician: No att. providers found   Discharging Provider: Berna Conteh MD  Primary Care Provider: Primary Doctor No      HPI:   59 year old female presented to ED from her Pulmonologist's office for acute on chronic respiratory failure. She was hypercapnic with hypoxemia. Her ABG (personally reviewed) upon presentation 7.36/62/59/35. Patient placed on BiPAP. Patient denies any anginal chest discomfort. She is chronically SOB. Wears CPAP at home. Denies orthopnea/PND. CXR personally reviewed shows cardiomegaly, but no infiltrate. EKG (personally reviewed) showed a fib with RVR. However the patient converted to sinus rhythm and stated that she has never had "an irregular heart beat". Patient personally discussed with ED physician, who stated that he had discussed the patient with her Pulmonologist and that ICU admission for titration of BiPAP was warrented    * No surgery found *      Hospital Course:   Patient was managed in ICU for hypercapnic and hypoxic respiratory failure.  She received steroids and antibiotics for possible exacerbation of COPD however she responded well on BiPAP with improvement in ABG  Patient's home BiPAP machine settings optimized  Blood sugar meds insulin titrated up   Tapered steroids and discharged with 4 more days of p.o. Prednisone  Levofloxacin discontinued  Advised to follow up with outpatient pulmonologist and Cardiology upon discharge    Consults:   Consults (From admission, onward)        Status Ordering Provider     Inpatient consult to Cardiology  Once     Provider:  Macario Tee MD    Completed ROBERT GUZMÁN     Inpatient consult to Pulmonology  Once     Provider:  Robert Guzmán MD    Completed VENUS CALLE     Inpatient " consult to Pulmonology  Once     Provider:  Percy Guzmán MD    Completed JOSEFA ARITA     Inpatient consult to   Once     Provider:  (Not yet assigned)    Completed KERLINE SERRA          Obesity hypoventilation syndrome  Need therapeutic lifestyle changes  Further plan as detailed above    Paroxysmal atrial fibrillation  New onset of atrial fibrillation.  Right now rhythm is sinus  Started on Eliquis 5 mg daily as per Cardiology recommendation  On  Cardizem 120 mg for rate control    Type 2 diabetes mellitus, with long-term current use of insulin  Blood sugar control improved  Advised to resume her home medications for the diabetes upon discharge      Morbid obesity  Need therapeutic lifestyle changes  Advice on bariatric sx    Obstructive sleep apnea syndrome  Patient currently stable on BiPAP setting 15/5  Patient can continue to use her home BiPAP machine        Final Active Diagnoses:    Diagnosis Date Noted POA    Obesity hypoventilation syndrome [E66.2]  Yes    Paroxysmal atrial fibrillation [I48.0] 11/27/2019 Unknown    Type 2 diabetes mellitus, with long-term current use of insulin [E11.9, Z79.4] 08/13/2019 Not Applicable    Obstructive sleep apnea syndrome [G47.33] 07/25/2017 Yes    Morbid obesity [E66.01] 07/25/2017 Yes      Problems Resolved During this Admission:    Diagnosis Date Noted Date Resolved POA    PRINCIPAL PROBLEM:  Acute on chronic respiratory failure with hypoxia and hypercapnia [J96.21, J96.22] 08/13/2019 11/30/2019 Unknown    Mild persistent asthma without complication [J45.30] 07/25/2017 11/29/2019 Yes       Discharged Condition: stable    Disposition: Home or Self Care    Follow Up:  Follow-up Information     Primary Doctor No In 1 week.           Percy Guzmán MD Today.    Specialty:  Pulmonary Disease  Contact information:  [address] [place] [address]  [phone]  [facility] [place] [id number] [phone]                 Patient Instructions:     "  Diet Cardiac     Diet diabetic     Notify your health care provider if you experience any of the following:     Activity as tolerated     Activity as tolerated       Significant Diagnostic Studies: Labs:   BMP:   Recent Labs   Lab 11/30/19 0424 11/30/19  1023 12/01/19  0348   * 462* 355*   *  --  137   K 4.7  --  4.3   CL 92*  --  92*   CO2 31*  --  32*   BUN 41*  --  40*   CREATININE 1.1  --  0.9   CALCIUM 9.6  --  9.5    and CMP   Recent Labs   Lab 11/30/19  0424 11/30/19  1023 12/01/19  0348   *  --  137   K 4.7  --  4.3   CL 92*  --  92*   CO2 31*  --  32*   * 462* 355*   BUN 41*  --  40*   CREATININE 1.1  --  0.9   CALCIUM 9.6  --  9.5   PROT 7.3  --   --    ALBUMIN 3.8  --   --    BILITOT 0.8  --   --    ALKPHOS 71  --   --    AST 12  --   --    ALT 12  --   --    ANIONGAP 12  --  13   ESTGFRAFRICA >60.0  --  >60.0   EGFRNONAA 55.9*  --  >60.0         Pending Diagnostic Studies:     None         Medications:  Reconciled Home Medications:      Medication List      START taking these medications    apixaban 5 mg Tab  Commonly known as:  ELIQUIS  Take 1 tablet (5 mg total) by mouth 2 (two) times daily.     diltiaZEM 120 MG Cp24  Commonly known as:  CARDIZEM CD  Take 1 capsule (120 mg total) by mouth once daily.  Start taking on:  December 2, 2019        CONTINUE taking these medications    albuterol 90 mcg/actuation inhaler  Commonly known as:  ProAir HFA  Inhale 2 puffs into the lungs every 6 (six) hours as needed for Wheezing. Rescue     atorvastatin 20 MG tablet  Commonly known as:  LIPITOR  TK 1 T PO QPM     * Basaglar KwikPen U-100 Insulin glargine 100 units/mL (3mL) SubQ pen  Generic drug:  insulin  Basaglar KwikPen U-100 Insulin 100 unit/mL (3 mL) subcutaneous 35units     * Basaglar KwikPen U-100 Insulin glargine 100 units/mL (3mL) SubQ pen  Generic drug:  insulin  INJECT SUBCUTANEOUS 35 UNITS EVERY MORNING     * BD Insulin Syringe Ultra-Fine 0.5 mL 31 gauge x 5/16" " "Syrg  Generic drug:  insulin syringe-needle U-100  BD Insulin Syringe Ultra-Fine 0.5 mL 31 gauge x 5/16"     * BD Insulin Syringe Ultra-Fine 0.5 mL 31 gauge x 5/16" Syrg  Generic drug:  insulin syringe-needle U-100  USE TO INJECT 4X DAILY     BD Ultra-Fine Whit Pen Needle 32 gauge x 5/32" Ndle  Generic drug:  pen needle, diabetic  BD Ultra-Fine Whit Pen Needle 32 gauge x 5/32"     benzonatate 100 MG capsule  Commonly known as:  TESSALON  benzonatate 100 mg capsule     cyclobenzaprine 10 MG tablet  Commonly known as:  FLEXERIL  cyclobenzaprine 10 mg tablet   TK 1 T PO TID PRF MUSCLE SPASMS     ergocalciferol 50,000 unit Cap  Commonly known as:  ERGOCALCIFEROL  ergocalciferol (vitamin D2) 50,000 unit capsule     fluticasone-salmeterol 250-50 mcg/dose 250-50 mcg/dose diskus inhaler  Commonly known as:  Advair Diskus  INHALE ONE PUFF INTO THE LUNGS TWICE DAILY     * FREESTYLE LITE STRIPS MISC  FreeStyle Lite Strips   USE UTD QID     * Freestyle InsuLinx Test Strips Strp  Generic drug:  blood sugar diagnostic  TEST BLOOD SUGAR QID     * FreeStyle Lancets 28 gauge Misc  Generic drug:  lancets  FreeStyle Lancets 28 gauge     * FreeStyle Lancets 28 gauge Misc  Generic drug:  lancets  TEST QID     gabapentin 300 MG capsule  Commonly known as:  NEURONTIN  TAKE ONE CAPSULE BY MOUTH IN THE MORNING, ONE CAPSULE AT NOON, AND 2 CAPSULES BEFORE BED     Gel-One 30 mg/3 mL  Generic drug:  hyaluronate sod, cross-linked  3 mLs.     glimepiride 4 MG tablet  Commonly known as:  AMARYL  Take 4 mg by mouth 2 (two) times daily.     hydroCHLOROthiazide 25 MG tablet  Commonly known as:  HYDRODIURIL  TK 1 T PO D     irbesartan 300 MG tablet  Commonly known as:  AVAPRO  TK 1 T PO D     levalbuterol 0.63 mg/3 mL nebulizer solution  Commonly known as:  XOPENEX  USE 1 VIAL VIA NEBULIZER THREE TIMES DAILY AS NEEDED FOR WHEEZING     levothyroxine 25 MCG tablet  Commonly known as:  SYNTHROID  TK 1 T PO D     meloxicam 15 MG tablet  Commonly known as: "  MOBIC  meloxicam 15 mg tablet     metFORMIN 500 MG tablet  Commonly known as:  GLUCOPHAGE  TK 1 T PO BID     NovoLOG Flexpen U-100 Insulin 100 unit/mL (3 mL) Inpn pen  Generic drug:  insulin aspart U-100  Novolog Flexpen U-100 Insulin aspart 100 unit/mL subcutaneous   INJECT 15 UNITS SUBCUTANEOUS THREE TIMES A DAY     nystatin-triamcinolone cream  Commonly known as:  MYCOLOG II  Apply to affected area 2 times daily     omeprazole 20 MG capsule  Commonly known as:  PRILOSEC  TK 1 C PO D PRN     ORTHOVISC 30 mg/2 mL Syrg  Generic drug:  sodium hyaluronate (orthovisc)  ORTHOVISC 30 mg/2 mL intra-articular syringe     Synvisc-One 48 mg/6 mL Syrg  Generic drug:  hylan g-f 20  6 mLs.     tiZANidine 2 MG tablet  Commonly known as:  ZANAFLEX  TK 1 T PO Q 8 H PRN     Victoza 3-Will 0.6 mg/0.1 mL (18 mg/3 mL) Pnij  Generic drug:  liraglutide 0.6 mg/0.1 mL (18 mg/3 mL) subq PNIJ  Victoza 3-Will 0.6 mg/0.1 mL (18 mg/3 mL) subcutaneous pen injector         * This list has 8 medication(s) that are the same as other medications prescribed for you. Read the directions carefully, and ask your doctor or other care provider to review them with you.            STOP taking these medications    amLODIPine 2.5 MG tablet  Commonly known as:  NORVASC     aspirin 81 MG EC tablet  Commonly known as:  ECOTRIN     azithromycin 250 MG tablet  Commonly known as:  Zithromax Z-Will     diazePAM 5 MG tablet  Commonly known as:  VALIUM     doxycycline 100 MG tablet  Commonly known as:  VIBRA-TABS            Indwelling Lines/Drains at time of discharge:   Lines/Drains/Airways     None                 Time spent on the discharge of patient: 30 minutes  Patient was seen and examined on the date of discharge and determined to be suitable for discharge.         Berna Conteh MD  Department of Hospital Medicine  [facility]

## 2020-04-29 ENCOUNTER — TELEPHONE (OUTPATIENT)
Dept: PULMONOLOGY | Facility: CLINIC | Age: 60
End: 2020-04-29

## 2020-05-01 ENCOUNTER — TELEPHONE (OUTPATIENT)
Dept: PULMONOLOGY | Facility: CLINIC | Age: 60
End: 2020-05-01

## 2020-05-01 NOTE — TELEPHONE ENCOUNTER
----- Message from Nichelle Aviles MA sent at 4/30/2020 10:20 AM CDT -----  Contact: Self, [phone]  PLEASE CALL PT TO SET UP VIRTUAL VISIT!!!!!!! AS SOON AS POSSIBLE!!! Thank you   ----- Message -----  From: Percy Guzmán MD  Sent: 4/29/2020  11:27 AM CDT  To: Nichelle Aviles MA    Don't know what to tell her without seeing her - she either needs to do a virtual visit, come in or go to an Urgent Care or ER    ----- Message -----  From: Nichelle Aviles MA  Sent: 4/29/2020   9:03 AM CDT  To: Percy Guzmán MD        ----- Message -----  From: Leonidas Tang  Sent: 4/29/2020   7:58 AM CDT  To: Arielle Greer Staff    The patient called stating her oxygen levels were dropping and she's having trouble breathing   when she walks to the bathroom. She he would like to know what to do.  She did not accept a virtual visit at this time. Please assist and thanks in advance.

## 2020-05-04 ENCOUNTER — OFFICE VISIT (OUTPATIENT)
Dept: PULMONOLOGY | Facility: CLINIC | Age: 60
End: 2020-05-04
Payer: COMMERCIAL

## 2020-05-04 DIAGNOSIS — G47.33 OBSTRUCTIVE SLEEP APNEA SYNDROME: ICD-10-CM

## 2020-05-04 DIAGNOSIS — J96.12 CHRONIC RESPIRATORY FAILURE WITH HYPOXIA AND HYPERCAPNIA: Primary | ICD-10-CM

## 2020-05-04 DIAGNOSIS — Z78.9 NONSMOKER: ICD-10-CM

## 2020-05-04 DIAGNOSIS — R06.00 DYSPNEA, UNSPECIFIED TYPE: ICD-10-CM

## 2020-05-04 DIAGNOSIS — J96.11 CHRONIC RESPIRATORY FAILURE WITH HYPOXIA AND HYPERCAPNIA: Primary | ICD-10-CM

## 2020-05-04 DIAGNOSIS — E66.01 MORBID OBESITY: ICD-10-CM

## 2020-05-04 DIAGNOSIS — E66.2 OBESITY HYPOVENTILATION SYNDROME: ICD-10-CM

## 2020-05-04 PROCEDURE — 99214 OFFICE O/P EST MOD 30 MIN: CPT | Mod: 95,,, | Performed by: INTERNAL MEDICINE

## 2020-05-04 PROCEDURE — 99214 PR OFFICE/OUTPT VISIT, EST, LEVL IV, 30-39 MIN: ICD-10-PCS | Mod: 95,,, | Performed by: INTERNAL MEDICINE

## 2020-05-04 NOTE — PROGRESS NOTES
HPI:    7/25/2017 - Here for follow up, overall doing well, no recent problems with her asthma, reports good compliance and benefit with her CPAP.    Pt asthma is currently stable with no increases in SOB, SOARES or wheezing.  There has been no increase in use of  rescue medications.  Have reviewed medications with pt including the roles of rescue and controlling medications.  All questions answered.  Pt reports no problems with technique with inhalers.  We discussed the possibility of de-escalation of therapy if asthma control remains stable.    ACT - 20    6/20/2019 - Here for follow up, Pt asthma is currently stable with no increases in SOB, SOARES or wheezing.  There has been no increase in use of  rescue medications.  Have reviewed medications with pt including the roles of rescue and controlling medications.  All questions answered.  Pt reports no problems with technique with inhalers.  We discussed the possibility of de-escalation of therapy if asthma control remains stable.  Has not been in hospital since the last time I saw her.  Going on a cruise in August.  Has gained about 21# (d/w her)     ACT - 20    11/27/2019 - Over last 3 weeks has had progressive SOB and having trouble with desaturations while awake.  Came to office for evaluation and sats were 79% on RA, no fever, chills, sweats, has not had much cough or congestion.  She reports that she has been using her CPAP regularly but still has somnolence.  SOB much worse with minimal exertion.  No chest pain of any type.  No increase in fluid retention or change in weight, some swelling of hands.    5/4/2020 - Virtual Visit    The chief complaint leading to consultation is: follow up  The patient location is:  home  Visit type: Virtual visit with synchronous audio and video    Here for follow up - last visit was 6 months ago - she was admitted and treated and went home and was told to increase CPAP to 15 but she may need BiPAP - she needs to have a repeat  titration study.  She is concerned about going in for the study because of the Covid issues.  Her breathing has been a little more labored.  Has some chronic SOB, SOARES and reports using O2 at 2-3 LPM.  No recent fever, chills, sweats, cough, sputum.    I have discussed the limitations of a virtual visit with the patient including the fact that there are no vital signs and no way to fully examine the patient.  This could lead to misdiagnosis and possibly to delays in appropriate care.    The physical exam in this note is pulled forward from prior visits.  It will be updated based on any findings which I can discern based on seeing the patient on a video screen.  I will not eliminate findings from prior exams at this visit.    CPAP Therapy    CPAP therapy - 10    Date of sleep study - na RDI - na    Pt reports good compliance and benefit with the therapy.    Face to face visit with pt concerning their CPAP therapy.  I have stressed continued compliance with the treatment and all questions were answered.    Home Oxygen 2 - 3 LPM uses qhs and prn    Face to face visit with pt regarding their home oxygen.  We have discussed the need for oxygen including continuous use, prn use or night time use (as appropriate for the pt).  We discussed the need for compliance with the therapy. We will do recertifications as necessary.     Nebulizer    Face to face visit regarding home nebulizer use.    I have discussed with the pt regarding the need for home nebulizer therapy.  I have stressed the need for compliance with the therapy and the need to let me know if there are any issues with the medication.  All questions answered.      Medications    Current Outpatient Medications:     albuterol (PROAIR HFA) 90 mcg/actuation inhaler, Inhale 2 puffs into the lungs every 6 (six) hours as needed for Wheezing. Rescue, Disp: 18 g, Rfl: 11    apixaban (ELIQUIS) 5 mg Tab, Take 1 tablet (5 mg total) by mouth 2 (two) times daily., Disp: 30  tablet, Rfl: 0    atorvastatin (LIPITOR) 20 MG tablet, TK 1 T PO QPM, Disp: , Rfl: 3    BASAGLAR KWIKPEN U-100 INSULIN glargine 100 units/mL (3mL) SubQ pen, INJECT SUBCUTANEOUS 35 UNITS EVERY MORNING, Disp: , Rfl: 7    BD INSULIN SYRINGE ULTRA-FINE 0.5 mL 31 gauge x 5/16 Syrg, USE TO INJECT 4X DAILY, Disp: , Rfl: 1    benzonatate (TESSALON) 100 MG capsule, benzonatate 100 mg capsule, Disp: , Rfl:     blood sugar diagnostic (FREESTYLE LITE STRIPS MISC), FreeStyle Lite Strips  USE UTD QID, Disp: , Rfl:     cyclobenzaprine (FLEXERIL) 10 MG tablet, cyclobenzaprine 10 mg tablet  TK 1 T PO TID PRF MUSCLE SPASMS, Disp: , Rfl:     diltiaZEM (CARDIZEM CD) 120 MG Cp24, Take 1 capsule (120 mg total) by mouth once daily., Disp: 30 capsule, Rfl: 0    ergocalciferol (ERGOCALCIFEROL) 50,000 unit Cap, ergocalciferol (vitamin D2) 50,000 unit capsule, Disp: , Rfl:     fluticasone-salmeterol diskus inhaler 250-50 mcg, INHALE ONE PUFF INTO THE LUNGS TWICE DAILY, Disp: 1 each, Rfl: 11    FREESTYLE INSULINX TEST STRIPS Strp, TEST BLOOD SUGAR QID, Disp: , Rfl: 3    FREESTYLE LANCETS 28 gauge lancets, TEST QID, Disp: , Rfl: 3    gabapentin (NEURONTIN) 300 MG capsule, TAKE ONE CAPSULE BY MOUTH IN THE MORNING, ONE CAPSULE AT NOON, AND 2 CAPSULES BEFORE BED, Disp: , Rfl: 6    glimepiride (AMARYL) 4 MG tablet, Take 4 mg by mouth 2 (two) times daily. , Disp: , Rfl: 3    hyaluronate sod, cross-linked (GEL-ONE) 30 mg/3 mL Syrg, 3 mLs., Disp: , Rfl:     hydrochlorothiazide (HYDRODIURIL) 25 MG tablet, TK 1 T PO D, Disp: , Rfl: 3    hylan g-f 20 (SYNVISC-ONE) 48 mg/6 mL Syrg, 6 mLs., Disp: , Rfl:     insulin (BASAGLAR KWIKPEN U-100 INSULIN) glargine 100 units/mL (3mL) SubQ pen, Basaglar KwikPen U-100 Insulin 100 unit/mL (3 mL) subcutaneous 35units, Disp: , Rfl:     insulin aspart U-100 (NOVOLOG FLEXPEN U-100 INSULIN) 100 unit/mL InPn pen, Novolog Flexpen U-100 Insulin aspart 100 unit/mL subcutaneous  INJECT 15 UNITS SUBCUTANEOUS  "THREE TIMES A DAY, Disp: , Rfl:     insulin syringe-needle U-100 (BD INSULIN SYRINGE ULTRA-FINE) 0.5 mL 31 gauge x 5/16" Syrg, BD Insulin Syringe Ultra-Fine 0.5 mL 31 gauge x 5/16", Disp: , Rfl:     irbesartan (AVAPRO) 300 MG tablet, TK 1 T PO D, Disp: , Rfl: 3    lancets (FREESTYLE LANCETS) 28 gauge Misc, FreeStyle Lancets 28 gauge, Disp: , Rfl:     levalbuterol (XOPENEX) 0.63 mg/3 mL nebulizer solution, USE 1 VIAL VIA NEBULIZER THREE TIMES DAILY AS NEEDED FOR WHEEZING, Disp: 75 mL, Rfl: 11    levothyroxine (SYNTHROID) 25 MCG tablet, TK 1 T PO D, Disp: , Rfl: 3    liraglutide 0.6 mg/0.1 mL, 18 mg/3 mL, subq PNIJ (VICTOZA 3-KAM) 0.6 mg/0.1 mL (18 mg/3 mL) PnIj, Victoza 3-Kam 0.6 mg/0.1 mL (18 mg/3 mL) subcutaneous pen injector, Disp: , Rfl:     meloxicam (MOBIC) 15 MG tablet, meloxicam 15 mg tablet, Disp: , Rfl:     metformin (GLUCOPHAGE) 500 MG tablet, TK 1 T PO BID, Disp: , Rfl: 3    nystatin-triamcinolone (MYCOLOG II) cream, Apply to affected area 2 times daily, Disp: 30 g, Rfl: 1    omeprazole (PRILOSEC) 20 MG capsule, TK 1 C PO D PRN, Disp: , Rfl: 1    pen needle, diabetic (BD ULTRA-FINE SUSHANT PEN NEEDLE) 32 gauge x 5/32" Ndle, BD Ultra-Fine Sushant Pen Needle 32 gauge x 5/32", Disp: , Rfl:     sodium hyaluronate, orthovisc, (ORTHOVISC) 30 mg/2 mL Syrg, ORTHOVISC 30 mg/2 mL intra-articular syringe, Disp: , Rfl:     tizanidine (ZANAFLEX) 2 MG tablet, TK 1 T PO Q 8 H PRN, Disp: , Rfl: 0    Allergies  Review of patient's allergies indicates:   Allergen Reactions    Benadryl allergy decongestant     Codeine        Social History    Social History     Tobacco Use   Smoking Status Never Smoker   Smokeless Tobacco Never Used     ETOH - 1-2 drinks per week.  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   HENT: Negative for congestion.    Eyes: Negative for pain.   Respiratory: Negative for cough, hemoptysis, sputum production, shortness of breath, wheezing and stridor.    Cardiovascular: Negative for chest pain, palpitations, orthopnea, claudication, leg swelling and PND.   Gastrointestinal: Negative for abdominal pain, constipation, diarrhea, heartburn, nausea and vomiting.   Genitourinary: Negative for dysuria, frequency and urgency.   Musculoskeletal: Negative for falls and myalgias.   Neurological: Negative for sensory change, focal weakness and weakness.   Psychiatric/Behavioral: Negative for depression. The patient is not nervous/anxious.        Physical Exam    There were no vitals filed for this visit.    Physical Exam   Constitutional: She is oriented to person, place, and time. She appears well-developed and well-nourished. No distress.   obese   HENT:   Head: Normocephalic and atraumatic.   Right Ear: External ear normal.   Left Ear: External ear normal.   Nose: Nose normal.   Mouth/Throat: Oropharynx is clear and moist.   Eyes: Pupils are equal, round, and reactive to light. Conjunctivae and EOM are normal.   Neck: Normal range of motion. Neck supple. No JVD present. No tracheal deviation present. No thyromegaly present.   Cardiovascular: Normal rate, regular rhythm and intact distal pulses. Exam reveals no gallop and no friction rub.   Murmur heard.  2/6 REMIGIO   Pulmonary/Chest: Effort normal and breath sounds normal. No stridor. No respiratory distress. She has no wheezes. She has no rales. She exhibits no tenderness.   Abdominal: Soft. Bowel sounds are normal. She exhibits no distension. There is no tenderness.   Musculoskeletal: Normal range of motion. She exhibits no edema or tenderness.   Lymphadenopathy:     She has no cervical  adenopathy.   Neurological: She is alert and oriented to person, place, and time. No cranial nerve deficit.   Skin: Skin is warm and dry. She is not diaphoretic.   Psychiatric: She has a normal mood and affect. Her behavior is normal.   Nursing note and vitals reviewed.      Lab      Xray      Impression/Plan  Problem List Items Addressed This Visit        Neuro    Obstructive sleep apnea syndrome  - stable with present therapy  - will look into whether we can do an autotitrating CPAP trial at home       Pulmonary    Chronic respiratory failure with hypoxia  - seems to be doing OK at home  - 95% sitting and talking to me        Mild persistent asthma without complication  - seems to be stable    DM  - follows with her primary MD                   Fluids/Electrolytes/Nutrition/GI    Morbid obesity  - Discussed with pt at length about the need to work on diet and weight loss.  Have offered suggestions on approaches for this problem.  Also discussed the need to start a regular exercise program.  We discussed at length the importance of regular exercise and working at getting to a healthier weight.  All questions answered.  Pt voices understanding of these principles and hopefully will take action.       Other    Never smoker      Other Visit Diagnoses    None.     Total time spent with patient: 30 minutes including chart review    Each patient to whom he or she provides medical services by telemedicine is:  (1) informed of the relationship between the physician and patient and the respective role of any other health care provider with respect to management of the patient; and (2) notified that he or she may decline to receive medical services by telemedicine and may withdraw from such care at any time.      Percy Guzmán MD

## 2020-05-12 ENCOUNTER — TELEPHONE (OUTPATIENT)
Dept: PULMONOLOGY | Facility: CLINIC | Age: 60
End: 2020-05-12

## 2020-05-12 DIAGNOSIS — G47.33 OBSTRUCTIVE SLEEP APNEA SYNDROME: Primary | ICD-10-CM

## 2020-05-12 NOTE — TELEPHONE ENCOUNTER
Patient called states that when she did a virtual visit on 05/04/2020 she was concerned about her cpap machine . She was under the impression she was getting a bipap machine or a better cpap machine . What does she need an can we send some supplies to a [employer] company . Her last cpap she got from [facility]

## 2020-05-21 ENCOUNTER — TELEPHONE (OUTPATIENT)
Dept: PULMONOLOGY | Facility: CLINIC | Age: 60
End: 2020-05-21

## 2020-05-21 NOTE — TELEPHONE ENCOUNTER
Pt upset. HST scheduled for 6/24. States that she cannot wait until 6/24, needs something done now....

## 2020-06-17 ENCOUNTER — PROCEDURE VISIT (OUTPATIENT)
Dept: SLEEP MEDICINE | Facility: HOSPITAL | Age: 60
End: 2020-06-17
Attending: INTERNAL MEDICINE
Payer: COMMERCIAL

## 2020-06-17 DIAGNOSIS — G47.33 OBSTRUCTIVE SLEEP APNEA SYNDROME: ICD-10-CM

## 2020-06-17 PROCEDURE — 95806 SLEEP STUDY UNATT&RESP EFFT: CPT

## 2020-06-18 ENCOUNTER — HOSPITAL ENCOUNTER (OUTPATIENT)
Dept: RADIOLOGY | Facility: HOSPITAL | Age: 60
Discharge: HOME | End: 2020-06-18
Attending: INTERNAL MEDICINE
Payer: COMMERCIAL

## 2020-06-18 ENCOUNTER — OFFICE VISIT (OUTPATIENT)
Dept: PULMONOLOGY | Facility: CLINIC | Age: 60
End: 2020-06-18
Payer: COMMERCIAL

## 2020-06-18 DIAGNOSIS — E66.01 MORBID OBESITY: ICD-10-CM

## 2020-06-18 DIAGNOSIS — G47.33 OBSTRUCTIVE SLEEP APNEA SYNDROME: ICD-10-CM

## 2020-06-18 DIAGNOSIS — J96.11 CHRONIC RESPIRATORY FAILURE WITH HYPOXIA AND HYPERCAPNIA: Primary | ICD-10-CM

## 2020-06-18 DIAGNOSIS — J96.12 CHRONIC RESPIRATORY FAILURE WITH HYPOXIA AND HYPERCAPNIA: Primary | ICD-10-CM

## 2020-06-18 DIAGNOSIS — E66.2 OBESITY HYPOVENTILATION SYNDROME: ICD-10-CM

## 2020-06-18 DIAGNOSIS — R06.00 DYSPNEA, UNSPECIFIED TYPE: ICD-10-CM

## 2020-06-18 DIAGNOSIS — J96.11 CHRONIC RESPIRATORY FAILURE WITH HYPOXIA AND HYPERCAPNIA: ICD-10-CM

## 2020-06-18 DIAGNOSIS — J96.12 CHRONIC RESPIRATORY FAILURE WITH HYPOXIA AND HYPERCAPNIA: ICD-10-CM

## 2020-06-18 PROCEDURE — 99214 PR OFFICE/OUTPT VISIT, EST, LEVL IV, 30-39 MIN: ICD-10-PCS | Mod: S$GLB,,, | Performed by: INTERNAL MEDICINE

## 2020-06-18 PROCEDURE — 99214 OFFICE O/P EST MOD 30 MIN: CPT | Mod: S$GLB,,, | Performed by: INTERNAL MEDICINE

## 2020-06-18 PROCEDURE — 71046 X-RAY EXAM CHEST 2 VIEWS: CPT | Mod: TC

## 2020-06-18 NOTE — PROGRESS NOTES
HPI:    7/25/2017 - Here for follow up, overall doing well, no recent problems with her asthma, reports good compliance and benefit with her CPAP.    Pt asthma is currently stable with no increases in SOB, SOARES or wheezing.  There has been no increase in use of  rescue medications.  Have reviewed medications with pt including the roles of rescue and controlling medications.  All questions answered.  Pt reports no problems with technique with inhalers.  We discussed the possibility of de-escalation of therapy if asthma control remains stable.    ACT - 20    6/20/2019 - Here for follow up, Pt asthma is currently stable with no increases in SOB, SOARES or wheezing.  There has been no increase in use of  rescue medications.  Have reviewed medications with pt including the roles of rescue and controlling medications.  All questions answered.  Pt reports no problems with technique with inhalers.  We discussed the possibility of de-escalation of therapy if asthma control remains stable.  Has not been in hospital since the last time I saw her.  Going on a cruise in August.  Has gained about 21# (d/w her)     ACT - 20    11/27/2019 - Over last 3 weeks has had progressive SOB and having trouble with desaturations while awake.  Came to office for evaluation and sats were 79% on RA, no fever, chills, sweats, has not had much cough or congestion.  She reports that she has been using her CPAP regularly but still has somnolence.  SOB much worse with minimal exertion.  No chest pain of any type.  No increase in fluid retention or change in weight, some swelling of hands.    5/4/2020 - Virtual Visit    The chief complaint leading to consultation is: follow up  The patient location is:  home  Visit type: Virtual visit with synchronous audio and video    Here for follow up - last visit was 6 months ago - she was admitted and treated and went home and was told to increase CPAP to 15 but she may need BiPAP - she needs to have a repeat  titration study.  She is concerned about going in for the study because of the Covid issues.  Her breathing has been a little more labored.  Has some chronic SOB, SOARES and reports using O2 at 2-3 LPM.  No recent fever, chills, sweats, cough, sputum.    I have discussed the limitations of a virtual visit with the patient including the fact that there are no vital signs and no way to fully examine the patient.  This could lead to misdiagnosis and possibly to delays in appropriate care.    The physical exam in this note is pulled forward from prior visits.  It will be updated based on any findings which I can discern based on seeing the patient on a video screen.  I will not eliminate findings from prior exams at this visit.    6/18/2020 - Here for follow up, has had trouble with increased SOB, SOARES.  She was sent home from the hospital ([facility]) in November and was told she needed BiPAP.  We have been working on restudying her - she had home sleep study last night - report not available yet.  No other symptoms but her dyspnea is clearly worse than before.  O2 sats at home have been 88% and above for the most part on room air.  No CHF symptoms.    6/19/2020 - HST shows sleep apnea (KATHY 6) but with severe desaturations (50's sats) - will order titration study.    CPAP Therapy    CPAP therapy - 10    Date of sleep study - na RDI - na    Pt reports good compliance and benefit with the therapy.    Face to face visit with pt concerning their CPAP therapy.  I have stressed continued compliance with the treatment and all questions were answered.    Home Oxygen 2 - 3 LPM uses qhs and prn    Face to face visit with pt regarding their home oxygen.  We have discussed the need for oxygen including continuous use, prn use or night time use (as appropriate for the pt).  We discussed the need for compliance with the therapy. We will do recertifications as necessary.     Nebulizer    Face to face visit regarding home nebulizer use.    I  have discussed with the pt regarding the need for home nebulizer therapy.  I have stressed the need for compliance with the therapy and the need to let me know if there are any issues with the medication.  All questions answered.      Medications    Current Outpatient Medications:     albuterol (PROAIR HFA) 90 mcg/actuation inhaler, Inhale 2 puffs into the lungs every 6 (six) hours as needed for Wheezing. Rescue, Disp: 18 g, Rfl: 11    apixaban (ELIQUIS) 5 mg Tab, Take 1 tablet (5 mg total) by mouth 2 (two) times daily., Disp: 30 tablet, Rfl: 0    atorvastatin (LIPITOR) 20 MG tablet, TK 1 T PO QPM, Disp: , Rfl: 3    BASAGLAR KWIKPEN U-100 INSULIN glargine 100 units/mL (3mL) SubQ pen, INJECT SUBCUTANEOUS 35 UNITS EVERY MORNING, Disp: , Rfl: 7    BD INSULIN SYRINGE ULTRA-FINE 0.5 mL 31 gauge x 5/16 Syrg, USE TO INJECT 4X DAILY, Disp: , Rfl: 1    benzonatate (TESSALON) 100 MG capsule, benzonatate 100 mg capsule, Disp: , Rfl:     blood sugar diagnostic (FREESTYLE LITE STRIPS MISC), FreeStyle Lite Strips  USE UTD QID, Disp: , Rfl:     cyclobenzaprine (FLEXERIL) 10 MG tablet, cyclobenzaprine 10 mg tablet  TK 1 T PO TID PRF MUSCLE SPASMS, Disp: , Rfl:     diltiaZEM (CARDIZEM CD) 120 MG Cp24, Take 1 capsule (120 mg total) by mouth once daily., Disp: 30 capsule, Rfl: 0    ergocalciferol (ERGOCALCIFEROL) 50,000 unit Cap, ergocalciferol (vitamin D2) 50,000 unit capsule, Disp: , Rfl:     fluticasone-salmeterol diskus inhaler 250-50 mcg, INHALE ONE PUFF INTO THE LUNGS TWICE DAILY, Disp: 1 each, Rfl: 11    FREESTYLE INSULINX TEST STRIPS Strp, TEST BLOOD SUGAR QID, Disp: , Rfl: 3    FREESTYLE LANCETS 28 gauge lancets, TEST QID, Disp: , Rfl: 3    gabapentin (NEURONTIN) 300 MG capsule, TAKE ONE CAPSULE BY MOUTH IN THE MORNING, ONE CAPSULE AT NOON, AND 2 CAPSULES BEFORE BED, Disp: , Rfl: 6    glimepiride (AMARYL) 4 MG tablet, Take 4 mg by mouth 2 (two) times daily. , Disp: , Rfl: 3    hyaluronate sod, cross-linked  "(GEL-ONE) 30 mg/3 mL Syrg, 3 mLs., Disp: , Rfl:     hydrochlorothiazide (HYDRODIURIL) 25 MG tablet, TK 1 T PO D, Disp: , Rfl: 3    hylan g-f 20 (SYNVISC-ONE) 48 mg/6 mL Syrg, 6 mLs., Disp: , Rfl:     insulin (BASAGLAR KWIKPEN U-100 INSULIN) glargine 100 units/mL (3mL) SubQ pen, Basaglar KwikPen U-100 Insulin 100 unit/mL (3 mL) subcutaneous 35units, Disp: , Rfl:     insulin aspart U-100 (NOVOLOG FLEXPEN U-100 INSULIN) 100 unit/mL InPn pen, Novolog Flexpen U-100 Insulin aspart 100 unit/mL subcutaneous  INJECT 15 UNITS SUBCUTANEOUS THREE TIMES A DAY, Disp: , Rfl:     insulin syringe-needle U-100 (BD INSULIN SYRINGE ULTRA-FINE) 0.5 mL 31 gauge x 5/16" Syrg, BD Insulin Syringe Ultra-Fine 0.5 mL 31 gauge x 5/16", Disp: , Rfl:     irbesartan (AVAPRO) 300 MG tablet, TK 1 T PO D, Disp: , Rfl: 3    lancets (FREESTYLE LANCETS) 28 gauge Misc, FreeStyle Lancets 28 gauge, Disp: , Rfl:     levalbuterol (XOPENEX) 0.63 mg/3 mL nebulizer solution, USE 1 VIAL VIA NEBULIZER THREE TIMES DAILY AS NEEDED FOR WHEEZING, Disp: 75 mL, Rfl: 11    levothyroxine (SYNTHROID) 25 MCG tablet, TK 1 T PO D, Disp: , Rfl: 3    liraglutide 0.6 mg/0.1 mL, 18 mg/3 mL, subq PNIJ (VICTOZA 3-KMA) 0.6 mg/0.1 mL (18 mg/3 mL) PnIj, Victoza 3-Kam 0.6 mg/0.1 mL (18 mg/3 mL) subcutaneous pen injector, Disp: , Rfl:     meloxicam (MOBIC) 15 MG tablet, meloxicam 15 mg tablet, Disp: , Rfl:     metformin (GLUCOPHAGE) 500 MG tablet, TK 1 T PO BID, Disp: , Rfl: 3    omeprazole (PRILOSEC) 20 MG capsule, TK 1 C PO D PRN, Disp: , Rfl: 1    pen needle, diabetic (BD ULTRA-FINE SUSHANT PEN NEEDLE) 32 gauge x 5/32" Ndle, BD Ultra-Fine Sushant Pen Needle 32 gauge x 5/32", Disp: , Rfl:     sodium hyaluronate, orthovisc, (ORTHOVISC) 30 mg/2 mL Syrg, ORTHOVISC 30 mg/2 mL intra-articular syringe, Disp: , Rfl:     tizanidine (ZANAFLEX) 2 MG tablet, TK 1 T PO Q 8 H PRN, Disp: , Rfl: 0    nystatin-triamcinolone (MYCOLOG II) cream, Apply to affected area 2 times daily, Disp: " 30 g, Rfl: 1    Allergies  Review of patient's allergies indicates:   Allergen Reactions    Benadryl allergy decongestant     Codeine        Social History    Social History     Tobacco Use   Smoking Status Never Smoker   Smokeless Tobacco Never Used     ETOH - 1-2 drinks per week.  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   HENT: Negative for congestion.    Eyes: Negative for pain.   Respiratory: Negative for cough, hemoptysis, sputum production, shortness of breath, wheezing and stridor.    Cardiovascular: Negative for chest pain, palpitations, orthopnea, claudication, leg swelling and PND.   Gastrointestinal: Negative for abdominal pain, constipation, diarrhea, heartburn, nausea and vomiting.   Genitourinary: Negative for dysuria, frequency and urgency.   Musculoskeletal: Negative for falls and myalgias.   Neurological: Negative for sensory change, focal weakness and weakness.   Psychiatric/Behavioral: Negative for depression. The patient is not nervous/anxious.        Physical Exam    Vitals:    06/18/20 1125   BP: (!) (P) 180/88   Pulse: (P) 89   Temp: (P) 97.3 °F (36.3 °C)   SpO2: (!) (P) 91%   Weight: (P) 124.7 kg (275 lb)       Physical Exam  Vitals signs and nursing note reviewed.   Constitutional:       General: She is not in acute distress.     Appearance: She is well-developed. She is not diaphoretic.      Comments: obese   HENT:      Head: Normocephalic and atraumatic.      Right Ear: External ear normal.      Left Ear: External ear normal.      Nose: Nose normal.   Eyes:      Conjunctiva/sclera: Conjunctivae normal.      Pupils: Pupils  are equal, round, and reactive to light.   Neck:      Musculoskeletal: Normal range of motion and neck supple.      Thyroid: No thyromegaly.      Vascular: No JVD.      Trachea: No tracheal deviation.   Cardiovascular:      Rate and Rhythm: Normal rate and regular rhythm.      Heart sounds: Murmur present. No friction rub. No gallop.       Comments: 2/6 REMIGIO  Pulmonary:      Effort: Pulmonary effort is normal. No respiratory distress.      Breath sounds: Normal breath sounds. No stridor. No wheezing or rales.   Chest:      Chest wall: No tenderness.   Abdominal:      General: Bowel sounds are normal. There is no distension.      Palpations: Abdomen is soft.      Tenderness: There is no abdominal tenderness.   Musculoskeletal: Normal range of motion.         General: No tenderness.   Lymphadenopathy:      Cervical: No cervical adenopathy.   Skin:     General: Skin is warm and dry.   Neurological:      Mental Status: She is alert and oriented to person, place, and time.      Cranial Nerves: No cranial nerve deficit.   Psychiatric:         Behavior: Behavior normal.         Lab      Xray      Impression/Plan  Problem List Items Addressed This Visit        Neuro    Obstructive sleep apnea syndrome  - await home sleep study  - will likely need a titration study       Pulmonary    Chronic respiratory failure with hypoxia  - seems to be doing OK at home  - 91% sitting and talking to me        Mild persistent asthma without complication  - seems to be stable    Dyspnea  - not clear what is driving this  - check CXR  - await sleep study results  - no acute bronchospasm  - check labs    DM  - follows with her primary MD    HTN  - reports good control at home  - told to watch closely    Heart Murmur  - ECHO 8/2019 was OK  - may need repeat                   Fluids/Electrolytes/Nutrition/GI    Morbid obesity  - Discussed with pt at length about the need to work on diet and weight loss.  Have offered suggestions on approaches for  this problem.  Also discussed the need to start a regular exercise program.  We discussed at length the importance of regular exercise and working at getting to a healthier weight.  All questions answered.  Pt voices understanding of these principles and hopefully will take action.       Other    Never smoker      Other Visit Diagnoses    None.         Percy Guzmán MD

## 2020-06-22 ENCOUNTER — TELEPHONE (OUTPATIENT)
Dept: PULMONOLOGY | Facility: CLINIC | Age: 60
End: 2020-06-22

## 2020-06-22 NOTE — TELEPHONE ENCOUNTER
Patient wants to know what her results are for the blood test . An she has concerns about her having fluid  An she still having problems walking . Wants to know what she can do about this ?

## 2020-06-23 ENCOUNTER — TELEPHONE (OUTPATIENT)
Dept: PULMONOLOGY | Facility: CLINIC | Age: 60
End: 2020-06-23

## 2020-06-23 DIAGNOSIS — Z01.818 PREOP EXAMINATION: Primary | ICD-10-CM

## 2020-06-23 NOTE — TELEPHONE ENCOUNTER
----- Message from Coleen Peterson sent at 6/23/2020  3:33 PM CDT -----  Regarding: COVID ORDERS NEEDED  Contact: [phone]  Dr. Guzmán and Staff,    It has been decided that each in-lab Sleep Study Order should be accompanied with a COVID Screening Order.   I have an in-lab order for a CPAP Titration Study for this PT.  Please add the screening order to Epic.  Use the UDU0368/Covid Screening & DX code: Z01.818 to order the Covid Screen.  Home Sleep Studies do not require the Covid Screen order, only in-lab Sleep Studies, such as PSG; CPAP & SPLIT nights.      Thank you,Chantel

## 2020-07-01 ENCOUNTER — TELEPHONE (OUTPATIENT)
Dept: PULMONOLOGY | Facility: CLINIC | Age: 60
End: 2020-07-01

## 2020-07-10 ENCOUNTER — HOSPITAL ENCOUNTER (OUTPATIENT)
Dept: PREADMISSION TESTING | Facility: HOSPITAL | Age: 60
Discharge: HOME | End: 2020-07-10
Attending: INTERNAL MEDICINE
Payer: COMMERCIAL

## 2020-07-10 DIAGNOSIS — Z01.818 PREOP EXAMINATION: ICD-10-CM

## 2020-07-10 PROCEDURE — U0003 INFECTIOUS AGENT DETECTION BY NUCLEIC ACID (DNA OR RNA); SEVERE ACUTE RESPIRATORY SYNDROME CORONAVIRUS 2 (SARS-COV-2) (CORONAVIRUS DISEASE [COVID-19]), AMPLIFIED PROBE TECHNIQUE, MAKING USE OF HIGH THROUGHPUT TECHNOLOGIES AS DESCRIBED BY CMS-2020-01-R: HCPCS

## 2020-07-11 LAB — SARS-COV-2 RNA RESP QL NAA+PROBE: NOT DETECTED

## 2020-07-12 ENCOUNTER — PROCEDURE VISIT (OUTPATIENT)
Dept: SLEEP MEDICINE | Facility: HOSPITAL | Age: 60
End: 2020-07-12
Attending: INTERNAL MEDICINE
Payer: COMMERCIAL

## 2020-07-12 DIAGNOSIS — G47.33 OBSTRUCTIVE SLEEP APNEA SYNDROME: ICD-10-CM

## 2020-07-12 PROCEDURE — 95811 POLYSOM 6/>YRS CPAP 4/> PARM: CPT

## 2020-07-15 ENCOUNTER — TELEPHONE (OUTPATIENT)
Dept: PULMONOLOGY | Facility: CLINIC | Age: 60
End: 2020-07-15

## 2020-07-15 DIAGNOSIS — J44.9 CHRONIC OBSTRUCTIVE PULMONARY DISEASE, UNSPECIFIED COPD TYPE: Primary | ICD-10-CM

## 2020-07-15 DIAGNOSIS — G47.33 OBSTRUCTIVE SLEEP APNEA SYNDROME: Primary | ICD-10-CM

## 2020-07-27 ENCOUNTER — TELEPHONE (OUTPATIENT)
Dept: PULMONOLOGY | Facility: CLINIC | Age: 60
End: 2020-07-27

## 2020-07-27 NOTE — TELEPHONE ENCOUNTER
Pt called stating she is SOB when walking and doing daily activities. Pt has a 6 min walk test schedule for tomorrow to qualify for O2. Says she cant wait for oxygen. I told her if it was bad then she needed to go to ER. Pt refused. Wants to know what you can do

## 2020-07-28 ENCOUNTER — CLINICAL SUPPORT (OUTPATIENT)
Dept: CARDIOLOGY | Facility: HOSPITAL | Age: 60
DRG: 811 | End: 2020-07-28
Attending: INTERNAL MEDICINE
Payer: COMMERCIAL

## 2020-07-28 ENCOUNTER — HOSPITAL ENCOUNTER (INPATIENT)
Facility: HOSPITAL | Age: 60
LOS: 4 days | Discharge: HOME | DRG: 811 | End: 2020-08-01
Attending: EMERGENCY MEDICINE | Admitting: INTERNAL MEDICINE
Payer: COMMERCIAL

## 2020-07-28 VITALS — HEIGHT: 62 IN | BODY MASS INDEX: 50.97 KG/M2 | WEIGHT: 277 LBS

## 2020-07-28 DIAGNOSIS — R01.1 MURMUR, CARDIAC: ICD-10-CM

## 2020-07-28 DIAGNOSIS — R09.02 HYPOXIA: Primary | ICD-10-CM

## 2020-07-28 DIAGNOSIS — R06.02 SHORTNESS OF BREATH: ICD-10-CM

## 2020-07-28 DIAGNOSIS — D64.9 SYMPTOMATIC ANEMIA: ICD-10-CM

## 2020-07-28 DIAGNOSIS — D64.9 ACUTE ON CHRONIC ANEMIA: ICD-10-CM

## 2020-07-28 DIAGNOSIS — D64.9 ACUTE ANEMIA: ICD-10-CM

## 2020-07-28 DIAGNOSIS — R06.02 SOB (SHORTNESS OF BREATH): ICD-10-CM

## 2020-07-28 PROBLEM — J45.20 MILD INTERMITTENT ASTHMA WITHOUT COMPLICATION: Status: ACTIVE | Noted: 2020-07-28

## 2020-07-28 LAB
ABO + RH BLD: NORMAL
ALBUMIN SERPL BCP-MCNC: 4 G/DL (ref 3.5–5.2)
ALP SERPL-CCNC: 80 U/L (ref 55–135)
ALT SERPL W/O P-5'-P-CCNC: 12 U/L (ref 10–44)
ANION GAP SERPL CALC-SCNC: 10 MMOL/L (ref 8–16)
AORTIC ROOT ANNULUS: 3.69 CM
AORTIC VALVE CUSP SEPERATION: 2.1 CM
AST SERPL-CCNC: 12 U/L (ref 10–40)
AV INDEX (PROSTH): 0.51
AV MEAN GRADIENT: 13 MMHG
AV PEAK GRADIENT: 22 MMHG
AV VALVE AREA: 1.81 CM2
AV VELOCITY RATIO: 39.41
BASOPHILS # BLD AUTO: 0.02 K/UL (ref 0–0.2)
BASOPHILS NFR BLD: 0.2 % (ref 0–1.9)
BILIRUB SERPL-MCNC: 0.4 MG/DL (ref 0.1–1)
BLD GP AB SCN CELLS X3 SERPL QL: NORMAL
BNP SERPL-MCNC: 50 PG/ML (ref 0–99)
BSA FOR ECHO PROCEDURE: 2.34 M2
BUN SERPL-MCNC: 25 MG/DL (ref 6–20)
CALCIUM SERPL-MCNC: 9.5 MG/DL (ref 8.7–10.5)
CHLORIDE SERPL-SCNC: 101 MMOL/L (ref 95–110)
CO2 SERPL-SCNC: 31 MMOL/L (ref 23–29)
CREAT SERPL-MCNC: 1.1 MG/DL (ref 0.5–1.4)
CV ECHO LV RWT: 0.37 CM
DIFFERENTIAL METHOD: ABNORMAL
DOP CALC AO PEAK VEL: 2.32 M/S
DOP CALC AO VTI: 38.26 CM
DOP CALC LVOT AREA: 3.6 CM2
DOP CALC LVOT DIAMETER: 2.13 CM
DOP CALC LVOT PEAK VEL: 91.42 M/S
DOP CALC LVOT STROKE VOLUME: 69.23 CM3
DOP CALCLVOT PEAK VEL VTI: 19.44 CM
E WAVE DECELERATION TIME: 215.48 MSEC
E/A RATIO: 1.4
E/E' RATIO: 12.1 M/S
ECHO LV POSTERIOR WALL: 0.97 CM (ref 0.6–1.1)
EOSINOPHIL # BLD AUTO: 0.2 K/UL (ref 0–0.5)
EOSINOPHIL NFR BLD: 1.9 % (ref 0–8)
ERYTHROCYTE [DISTWIDTH] IN BLOOD BY AUTOMATED COUNT: 18.4 % (ref 11.5–14.5)
EST. GFR  (AFRICAN AMERICAN): >60 ML/MIN/1.73 M^2
EST. GFR  (NON AFRICAN AMERICAN): 55.9 ML/MIN/1.73 M^2
FRACTIONAL SHORTENING: 16 % (ref 28–44)
GLUCOSE SERPL-MCNC: 132 MG/DL (ref 70–110)
GLUCOSE SERPL-MCNC: 159 MG/DL (ref 70–110)
GLUCOSE SERPL-MCNC: 170 MG/DL (ref 70–110)
GLUCOSE SERPL-MCNC: 222 MG/DL (ref 70–110)
HCT VFR BLD AUTO: 27.9 % (ref 37–48.5)
HGB BLD-MCNC: 7.4 G/DL (ref 12–16)
IMM GRANULOCYTES # BLD AUTO: 0.04 K/UL (ref 0–0.04)
IMM GRANULOCYTES NFR BLD AUTO: 0.4 % (ref 0–0.5)
INR PPP: 1.2
INTERVENTRICULAR SEPTUM: 0.97 CM (ref 0.6–1.1)
IVRT: 53.87 MSEC
LEFT ATRIUM SIZE: 3.4 CM
LEFT INTERNAL DIMENSION IN SYSTOLE: 4.43 CM (ref 2.1–4)
LEFT VENTRICLE MASS INDEX: 87 G/M2
LEFT VENTRICULAR INTERNAL DIMENSION IN DIASTOLE: 5.27 CM (ref 3.5–6)
LEFT VENTRICULAR MASS: 190.66 G
LV LATERAL E/E' RATIO: 12.7 M/S
LV SEPTAL E/E' RATIO: 11.55 M/S
LYMPHOCYTES # BLD AUTO: 1.3 K/UL (ref 1–4.8)
LYMPHOCYTES NFR BLD: 14 % (ref 18–48)
MCH RBC QN AUTO: 20.2 PG (ref 27–31)
MCHC RBC AUTO-ENTMCNC: 26.5 G/DL (ref 32–36)
MCV RBC AUTO: 76 FL (ref 82–98)
MONOCYTES # BLD AUTO: 0.5 K/UL (ref 0.3–1)
MONOCYTES NFR BLD: 5.8 % (ref 4–15)
MV PEAK A VEL: 0.91 M/S
MV PEAK E VEL: 1.27 M/S
NEUTROPHILS # BLD AUTO: 7.1 K/UL (ref 1.8–7.7)
NEUTROPHILS NFR BLD: 77.7 % (ref 38–73)
NRBC BLD-RTO: 0 /100 WBC
OB PNL STL: POSITIVE
PLATELET # BLD AUTO: 278 K/UL (ref 150–350)
PMV BLD AUTO: 10 FL (ref 9.2–12.9)
POTASSIUM SERPL-SCNC: 4 MMOL/L (ref 3.5–5.1)
PROT SERPL-MCNC: 7.7 G/DL (ref 6–8.4)
PROTHROMBIN TIME: 14.9 SEC (ref 10.6–14.8)
PV PEAK VELOCITY: 128.33 CM/S
RA PRESSURE: 3 MMHG
RBC # BLD AUTO: 3.67 M/UL (ref 4–5.4)
RIGHT VENTRICULAR END-DIASTOLIC DIMENSION: 255 CM
SARS-COV-2 RDRP RESP QL NAA+PROBE: NEGATIVE
SODIUM SERPL-SCNC: 142 MMOL/L (ref 136–145)
TDI LATERAL: 0.1 M/S
TDI SEPTAL: 0.11 M/S
TDI: 0.11 M/S
TROPONIN I SERPL DL<=0.01 NG/ML-MCNC: <0.03 NG/ML
WBC # BLD AUTO: 9.09 K/UL (ref 3.9–12.7)

## 2020-07-28 PROCEDURE — 94761 N-INVAS EAR/PLS OXIMETRY MLT: CPT

## 2020-07-28 PROCEDURE — P9016 RBC LEUKOCYTES REDUCED: HCPCS

## 2020-07-28 PROCEDURE — 80053 COMPREHEN METABOLIC PANEL: CPT

## 2020-07-28 PROCEDURE — 82272 OCCULT BLD FECES 1-3 TESTS: CPT

## 2020-07-28 PROCEDURE — 21400001 HC TELEMETRY ROOM

## 2020-07-28 PROCEDURE — C9399 UNCLASSIFIED DRUGS OR BIOLOG: HCPCS | Performed by: INTERNAL MEDICINE

## 2020-07-28 PROCEDURE — 99222 PR INITIAL HOSPITAL CARE,LEVL II: ICD-10-PCS | Mod: ,,, | Performed by: INTERNAL MEDICINE

## 2020-07-28 PROCEDURE — 94640 AIRWAY INHALATION TREATMENT: CPT

## 2020-07-28 PROCEDURE — 36415 COLL VENOUS BLD VENIPUNCTURE: CPT

## 2020-07-28 PROCEDURE — 63600175 PHARM REV CODE 636 W HCPCS: Performed by: INTERNAL MEDICINE

## 2020-07-28 PROCEDURE — 84484 ASSAY OF TROPONIN QUANT: CPT

## 2020-07-28 PROCEDURE — 85610 PROTHROMBIN TIME: CPT

## 2020-07-28 PROCEDURE — 27000221 HC OXYGEN, UP TO 24 HOURS

## 2020-07-28 PROCEDURE — 99222 1ST HOSP IP/OBS MODERATE 55: CPT | Mod: ,,, | Performed by: INTERNAL MEDICINE

## 2020-07-28 PROCEDURE — 86901 BLOOD TYPING SEROLOGIC RH(D): CPT

## 2020-07-28 PROCEDURE — 86920 COMPATIBILITY TEST SPIN: CPT

## 2020-07-28 PROCEDURE — 25000003 PHARM REV CODE 250: Performed by: EMERGENCY MEDICINE

## 2020-07-28 PROCEDURE — 93005 ELECTROCARDIOGRAM TRACING: CPT | Performed by: SPECIALIST

## 2020-07-28 PROCEDURE — 99285 EMERGENCY DEPT VISIT HI MDM: CPT | Mod: 25

## 2020-07-28 PROCEDURE — U0002 COVID-19 LAB TEST NON-CDC: HCPCS

## 2020-07-28 PROCEDURE — 25000003 PHARM REV CODE 250: Performed by: INTERNAL MEDICINE

## 2020-07-28 PROCEDURE — 93306 TTE W/DOPPLER COMPLETE: CPT

## 2020-07-28 PROCEDURE — 85025 COMPLETE CBC W/AUTO DIFF WBC: CPT

## 2020-07-28 PROCEDURE — 83880 ASSAY OF NATRIURETIC PEPTIDE: CPT

## 2020-07-28 PROCEDURE — 99900035 HC TECH TIME PER 15 MIN (STAT)

## 2020-07-28 PROCEDURE — 25000242 PHARM REV CODE 250 ALT 637 W/ HCPCS: Performed by: INTERNAL MEDICINE

## 2020-07-28 RX ORDER — ASPIRIN 325 MG
325 TABLET ORAL
Status: COMPLETED | OUTPATIENT
Start: 2020-07-28 | End: 2020-07-28

## 2020-07-28 RX ORDER — ACETAMINOPHEN 325 MG/1
650 TABLET ORAL EVERY 4 HOURS PRN
Status: DISCONTINUED | OUTPATIENT
Start: 2020-07-28 | End: 2020-08-01 | Stop reason: HOSPADM

## 2020-07-28 RX ORDER — ASPIRIN 325 MG
1 TABLET, DELAYED RELEASE (ENTERIC COATED) ORAL
COMMUNITY

## 2020-07-28 RX ORDER — FLUTICASONE FUROATE AND VILANTEROL 100; 25 UG/1; UG/1
1 POWDER RESPIRATORY (INHALATION) DAILY
Status: DISCONTINUED | OUTPATIENT
Start: 2020-07-28 | End: 2020-08-01 | Stop reason: HOSPADM

## 2020-07-28 RX ORDER — LANOLIN ALCOHOL/MO/W.PET/CERES
400 CREAM (GRAM) TOPICAL DAILY
COMMUNITY

## 2020-07-28 RX ORDER — LANOLIN ALCOHOL/MO/W.PET/CERES
800 CREAM (GRAM) TOPICAL
Status: DISCONTINUED | OUTPATIENT
Start: 2020-07-28 | End: 2020-08-01 | Stop reason: HOSPADM

## 2020-07-28 RX ORDER — INSULIN ASPART 100 [IU]/ML
10 INJECTION, SOLUTION INTRAVENOUS; SUBCUTANEOUS
Status: DISCONTINUED | OUTPATIENT
Start: 2020-07-28 | End: 2020-08-01 | Stop reason: HOSPADM

## 2020-07-28 RX ORDER — PANTOPRAZOLE SODIUM 40 MG/1
40 TABLET, DELAYED RELEASE ORAL DAILY
Status: DISCONTINUED | OUTPATIENT
Start: 2020-07-28 | End: 2020-08-01 | Stop reason: HOSPADM

## 2020-07-28 RX ORDER — FUROSEMIDE 10 MG/ML
20 INJECTION INTRAMUSCULAR; INTRAVENOUS
Status: COMPLETED | OUTPATIENT
Start: 2020-07-28 | End: 2020-07-29

## 2020-07-28 RX ORDER — INSULIN ASPART 100 [IU]/ML
1-10 INJECTION, SOLUTION INTRAVENOUS; SUBCUTANEOUS
Status: DISCONTINUED | OUTPATIENT
Start: 2020-07-28 | End: 2020-08-01 | Stop reason: HOSPADM

## 2020-07-28 RX ORDER — HYDROCODONE BITARTRATE AND ACETAMINOPHEN 500; 5 MG/1; MG/1
TABLET ORAL
Status: DISCONTINUED | OUTPATIENT
Start: 2020-07-28 | End: 2020-08-01 | Stop reason: HOSPADM

## 2020-07-28 RX ORDER — POTASSIUM CHLORIDE 20 MEQ/15ML
40 SOLUTION ORAL
Status: DISCONTINUED | OUTPATIENT
Start: 2020-07-28 | End: 2020-08-01 | Stop reason: HOSPADM

## 2020-07-28 RX ORDER — INSULIN ASPART 100 [IU]/ML
25 INJECTION, SOLUTION INTRAVENOUS; SUBCUTANEOUS 3 TIMES DAILY
COMMUNITY

## 2020-07-28 RX ORDER — AMOXICILLIN 250 MG
1 CAPSULE ORAL 2 TIMES DAILY
Status: DISCONTINUED | OUTPATIENT
Start: 2020-07-28 | End: 2020-08-01 | Stop reason: HOSPADM

## 2020-07-28 RX ORDER — DILTIAZEM HYDROCHLORIDE 120 MG/1
120 CAPSULE, COATED, EXTENDED RELEASE ORAL DAILY
Status: DISCONTINUED | OUTPATIENT
Start: 2020-07-28 | End: 2020-07-30

## 2020-07-28 RX ORDER — IBUPROFEN 200 MG
24 TABLET ORAL
Status: DISCONTINUED | OUTPATIENT
Start: 2020-07-28 | End: 2020-08-01 | Stop reason: HOSPADM

## 2020-07-28 RX ORDER — LANOLIN ALCOHOL/MO/W.PET/CERES
400 CREAM (GRAM) TOPICAL DAILY
Status: DISCONTINUED | OUTPATIENT
Start: 2020-07-28 | End: 2020-08-01 | Stop reason: HOSPADM

## 2020-07-28 RX ORDER — LEVOTHYROXINE SODIUM 25 UG/1
25 TABLET ORAL DAILY
Status: DISCONTINUED | OUTPATIENT
Start: 2020-07-28 | End: 2020-08-01 | Stop reason: HOSPADM

## 2020-07-28 RX ORDER — ATORVASTATIN CALCIUM 20 MG/1
20 TABLET, FILM COATED ORAL NIGHTLY
Status: DISCONTINUED | OUTPATIENT
Start: 2020-07-28 | End: 2020-08-01 | Stop reason: HOSPADM

## 2020-07-28 RX ORDER — LEVALBUTEROL INHALATION SOLUTION 0.63 MG/3ML
1 SOLUTION RESPIRATORY (INHALATION) 3 TIMES DAILY PRN
Status: DISCONTINUED | OUTPATIENT
Start: 2020-07-28 | End: 2020-08-01 | Stop reason: HOSPADM

## 2020-07-28 RX ORDER — GLIMEPIRIDE 4 MG/1
4 TABLET ORAL
COMMUNITY

## 2020-07-28 RX ORDER — IBUPROFEN 200 MG
16 TABLET ORAL
Status: DISCONTINUED | OUTPATIENT
Start: 2020-07-28 | End: 2020-08-01 | Stop reason: HOSPADM

## 2020-07-28 RX ORDER — ALBUTEROL SULFATE 90 UG/1
2 AEROSOL, METERED RESPIRATORY (INHALATION) EVERY 6 HOURS PRN
Status: DISCONTINUED | OUTPATIENT
Start: 2020-07-28 | End: 2020-08-01 | Stop reason: HOSPADM

## 2020-07-28 RX ORDER — HYDROCHLOROTHIAZIDE 25 MG/1
25 TABLET ORAL DAILY
Status: DISCONTINUED | OUTPATIENT
Start: 2020-07-28 | End: 2020-08-01 | Stop reason: HOSPADM

## 2020-07-28 RX ORDER — GLUCAGON 1 MG
1 KIT INJECTION
Status: DISCONTINUED | OUTPATIENT
Start: 2020-07-28 | End: 2020-08-01 | Stop reason: HOSPADM

## 2020-07-28 RX ORDER — GABAPENTIN 300 MG/1
600 CAPSULE ORAL 2 TIMES DAILY
COMMUNITY

## 2020-07-28 RX ORDER — LEVALBUTEROL INHALATION SOLUTION 0.63 MG/3ML
1 SOLUTION RESPIRATORY (INHALATION) 3 TIMES DAILY PRN
COMMUNITY
End: 2020-08-17

## 2020-07-28 RX ORDER — ONDANSETRON 2 MG/ML
4 INJECTION INTRAMUSCULAR; INTRAVENOUS EVERY 6 HOURS PRN
Status: DISCONTINUED | OUTPATIENT
Start: 2020-07-28 | End: 2020-08-01 | Stop reason: HOSPADM

## 2020-07-28 RX ADMIN — SENNOSIDES AND DOCUSATE SODIUM 1 TABLET: 8.6; 5 TABLET ORAL at 04:07

## 2020-07-28 RX ADMIN — INSULIN ASPART 10 UNITS: 100 INJECTION, SOLUTION INTRAVENOUS; SUBCUTANEOUS at 04:07

## 2020-07-28 RX ADMIN — FLUTICASONE FUROATE AND VILANTEROL TRIFENATATE 1 PUFF: 100; 25 POWDER RESPIRATORY (INHALATION) at 03:07

## 2020-07-28 RX ADMIN — INSULIN DETEMIR 30 UNITS: 100 INJECTION, SOLUTION SUBCUTANEOUS at 09:07

## 2020-07-28 RX ADMIN — FUROSEMIDE 20 MG: 10 INJECTION, SOLUTION INTRAMUSCULAR; INTRAVENOUS at 09:07

## 2020-07-28 RX ADMIN — MAGNESIUM OXIDE 400 MG: 400 TABLET ORAL at 04:07

## 2020-07-28 RX ADMIN — ASPIRIN 325 MG ORAL TABLET 325 MG: 325 PILL ORAL at 10:07

## 2020-07-28 RX ADMIN — INSULIN ASPART 2 UNITS: 100 INJECTION, SOLUTION INTRAVENOUS; SUBCUTANEOUS at 09:07

## 2020-07-28 RX ADMIN — ACETAMINOPHEN 650 MG: 325 TABLET, FILM COATED ORAL at 04:07

## 2020-07-28 RX ADMIN — HYDROCHLOROTHIAZIDE 25 MG: 25 TABLET ORAL at 04:07

## 2020-07-28 RX ADMIN — PANTOPRAZOLE SODIUM 40 MG: 40 TABLET, DELAYED RELEASE ORAL at 04:07

## 2020-07-28 RX ADMIN — ATORVASTATIN CALCIUM 20 MG: 20 TABLET, FILM COATED ORAL at 09:07

## 2020-07-28 RX ADMIN — DILTIAZEM HYDROCHLORIDE 120 MG: 120 CAPSULE, COATED, EXTENDED RELEASE ORAL at 04:07

## 2020-07-28 NOTE — ED PROVIDER NOTES
"Encounter Date: 2020       History     Chief Complaint   Patient presents with    Shortness of Breath     X "AWHILE", MONTHS     This is an emergent evaluation of a 59-year-old female with past medical history significant of diabetes, hypertension, hyperlipidemia and asthma presents in acute respiratory distress.  Patient states she developed an onset of shortness of breath x1 day.  She states she has chronic shortness of breath but today was worse.  She denies any fevers, chills, sweats, cough, nausea, vomiting or chest pain associated.  She is otherwise stable and has no other complaints.        Review of patient's allergies indicates:   Allergen Reactions    Benadryl allergy decongestant     Codeine      Past Medical History:   Diagnosis Date    Acute on chronic respiratory failure 2019    Anemia 2020    Arthritis     Asthma     Chronic bronchitis     Diabetes mellitus     Hyperlipidemia     Hypertension     Pain in joint of left shoulder region 2018    Sleep apnea     Thyroid disease      Past Surgical History:   Procedure Laterality Date    APPENDECTOMY       SECTION      x3    COLONOSCOPY N/A 2020    Procedure: COLONOSCOPY;  Surgeon: Maicol Ortiz MD;  Location: [facility];  Service: Endoscopy;  Laterality: N/A;    COLONOSCOPY Left 2020    Procedure: COLONOSCOPY;  Surgeon: MARIA EUGENIA Barone MD;  Location: [facility];  Service: Endoscopy;  Laterality: Left;    ESOPHAGOGASTRODUODENOSCOPY Left 2020    Procedure: EGD (ESOPHAGOGASTRODUODENOSCOPY);  Surgeon: Maicol Ortiz MD;  Location: [facility];  Service: Endoscopy;  Laterality: Left;    HYSTERECTOMY      OOPHORECTOMY Bilateral     age 42     Family History   Problem Relation Age of Onset    Pancreatic cancer Mother     Breast cancer Neg Hx     Colon cancer Neg Hx     Ovarian cancer Neg Hx      Social History     Tobacco Use    Smoking status: Never Smoker    Smokeless tobacco: Never Used "   Substance Use Topics    Alcohol use: Yes     Comment: occasional    Drug use: No     Review of Systems   Constitutional: Negative for fever.   HENT: Negative for sore throat.    Respiratory: Positive for shortness of breath.    Cardiovascular: Negative for chest pain.   Gastrointestinal: Negative for nausea.   Genitourinary: Negative for dysuria.   Musculoskeletal: Negative for back pain.   Skin: Negative for rash.   Neurological: Negative for weakness.   Hematological: Does not bruise/bleed easily.   All other systems reviewed and are negative.      Physical Exam     Initial Vitals   BP Pulse Resp Temp SpO2   07/28/20 0942 07/28/20 0941 07/28/20 0942 07/28/20 0942 07/28/20 0941   (!) 165/75 94 (!) 30 98.1 °F (36.7 °C) (!) 79 %      MAP       --                Physical Exam    Nursing note and vitals reviewed.  Constitutional: She appears well-developed and well-nourished. She appears distressed.   HENT:   Head: Normocephalic and atraumatic.   Mouth/Throat: Oropharynx is clear and moist.   Eyes: Conjunctivae and EOM are normal. Pupils are equal, round, and reactive to light.   Neck: Normal range of motion. No tracheal deviation present. No JVD present.   Cardiovascular: Normal rate, regular rhythm, normal heart sounds and intact distal pulses. Exam reveals no gallop and no friction rub.    No murmur heard.  Pulmonary/Chest: Breath sounds normal. She is in respiratory distress. She has no wheezes. She exhibits no tenderness.   Abdominal: Soft. Bowel sounds are normal. She exhibits no distension. There is no abdominal tenderness. There is no rebound and no guarding.   Musculoskeletal: Normal range of motion. No tenderness or edema.   Neurological: She is alert and oriented to person, place, and time. She has normal strength. No cranial nerve deficit or sensory deficit.   Skin: Skin is warm and dry. Capillary refill takes less than 2 seconds. No erythema.   Psychiatric: She has a normal mood and affect.         ED  Course   Critical Care    Date/Time: 9/9/2020 5:47 AM  Performed by: Rajesh Rolon MD  Authorized by: Solomon Sethi MD   Direct patient critical care time: 25 minutes  Additional history critical care time: 15 minutes  Ordering / reviewing critical care time: 15 minutes  Documentation critical care time: 20 minutes  Consulting other physicians critical care time: 10 minutes  Consult with family critical care time: 10 minutes  Total critical care time (exclusive of procedural time) : 95 minutes  Critical care was necessary to treat or prevent imminent or life-threatening deterioration of the following conditions: respiratory failure.  Critical care was time spent personally by me on the following activities: discussions with consultants, evaluation of patient's response to treatment, obtaining history from patient or surrogate, ordering and review of laboratory studies, re-evaluation of patient's condition, ordering and review of radiographic studies, ordering and performing treatments and interventions, examination of patient and development of treatment plan with patient or surrogate.        Labs Reviewed   CBC W/ AUTO DIFFERENTIAL - Abnormal; Notable for the following components:       Result Value    RBC 3.67 (*)     Hemoglobin 7.4 (*)     Hematocrit 27.9 (*)     Mean Corpuscular Volume 76 (*)     Mean Corpuscular Hemoglobin 20.2 (*)     Mean Corpuscular Hemoglobin Conc 26.5 (*)     RDW 18.4 (*)     Gran% 77.7 (*)     Lymph% 14.0 (*)     All other components within normal limits   COMPREHENSIVE METABOLIC PANEL - Abnormal; Notable for the following components:    CO2 31 (*)     Glucose 132 (*)     BUN, Bld 25 (*)     eGFR if non  55.9 (*)     All other components within normal limits   PROTIME-INR - Abnormal; Notable for the following components:    PT 14.9 (*)     All other components within normal limits   OCCULT BLOOD X 1, STOOL - Abnormal; Notable for the following components:    Occult Blood  Positive (*)     All other components within normal limits   POCT GLUCOSE - Abnormal; Notable for the following components:    POC Glucose 159 (*)     All other components within normal limits   TROPONIN I   B-TYPE NATRIURETIC PEPTIDE   SARS-COV-2 RNA AMPLIFICATION, QUAL   TYPE & SCREEN        ECG Results          EKG 12-lead (Final result)  Result time 07/28/20 21:14:54    Final result by Interface, Lab In [facility] (07/28/20 21:14:54)                 Narrative:    Test Reason : R06.02,    Vent. Rate : 087 BPM     Atrial Rate : 087 BPM     P-R Int : 164 ms          QRS Dur : 096 ms      QT Int : 364 ms       P-R-T Axes : 023 -44 079 degrees     QTc Int : 438 ms    Normal sinus rhythm  Left axis deviation  LVH with repolarization abnormality  Abnormal ECG  When compared with ECG of 27-NOV-2019 12:33,  Sinus rhythm has replaced Atrial fibrillation  Questionable change in QRS duration  Confirmed by Burke SOLIMAN, Cuco MADDEN ([id number]) on 7/28/2020 9:14:43 PM    Referred By:  FRACISCO           Confirmed By:Cuco Turk MD                            Imaging Results          X-Ray Chest AP Portable (Final result)  Result time 07/28/20 10:45:41    Final result by Cruzito Cesar MD (07/28/20 10:45:41)                 Narrative:    HISTORY: Dyspnea, acute on chronic respiratory failure.    FINDINGS: Portable chest radiograph at 1042 hours compared to  06/18/2020 shows the cardiac silhouette is enlarged, stable in size,  with the pulmonary vasculature stable and within normal limits.    The lungs are normally expanded, with no consolidation, large pleural  effusion, or evidence of pulmonary edema. No confluent infiltrates or  pneumothorax. No acute osseous abnormalities.    IMPRESSION: Stable cardiomegaly, with no evidence of acute  cardiopulmonary disease.    Electronically Signed by Cruzito CORTÉS on 7/28/2020 10:47 AM                               Medical Decision Making:   Initial Assessment:   59-year-old female on initial  assessment in moderate distress secondary to hypoxia.  Patient is alert and oriented x3, neurologically and neurovascular intact no focal deficits.  She is nontoxic appearing however in respiratory distress.  Differential Diagnosis:   MI, GERD, COPD, pneumonia, CHF exacerbation, anemia, COVID-19  Independently Interpreted Test(s):   I have ordered and independently interpreted X-rays - see prior notes.  I have ordered and independently interpreted EKG Reading(s) - see prior notes  Clinical Tests:   Lab Tests: Ordered and Reviewed  The following lab test(s) were unremarkable: CBC, CMP, Troponin and BNP  Radiological Study: Ordered and Reviewed  Medical Tests: Ordered and Reviewed  ED Management:  Patient has been reassessed noted to have no acute change in her condition.  She responded well to nasal cannula however patient is anemic with positive stool occult.  Patient has been consult to hospitalist for admission for further workup and treatment.  She has remained stable while in the ED and Ms. Lou is aware of the plan and in agreement with admission.    Laboratory results and radiology imaging results reviewed.  Based on the patient's history, physical, and workup here in the emergency department I believe the patient requires admission for a diagnosis of hypoxia and anemia.  I discussed the patient's case with the on-call NP who has agreed to evaluate the patient for admission.  In addition, I discussed the results with the patient and they have verbalized understanding of the results, plan, and need for admission.    ________________________  ESTELLE Rolon MD   Emergency Medicine                                   Clinical Impression:       ICD-10-CM ICD-9-CM   1. Hypoxia  R09.02 799.02   2. SOB (shortness of breath)  R06.02 786.05   3. Shortness of breath  R06.02 786.05   4. Acute on chronic anemia  D64.9 285.9   5. Symptomatic anemia  D64.9 285.9   6. Murmur, cardiac  R01.1 785.2   7. Acute anemia  D64.9 285.9              ED Disposition Condition    Admit                           Rajesh Rolon MD  07/28/20 1142       Rajesh Rolon MD  09/09/20 0548

## 2020-07-28 NOTE — ED NOTES
Pt reports using  oxygen nc only at night at 3.5 lpm nc. Pt is currently on 4 lpm nc with O2 SAT AT 96%.

## 2020-07-28 NOTE — PLAN OF CARE
07/28/20 1531   Patient Assessment/Suction   Level of Consciousness (AVPU) alert   Respiratory Effort Unlabored   All Lung Fields Breath Sounds clear   PRE-TX-O2   O2 Device (Oxygen Therapy) nasal cannula   $ Is the patient on Low Flow Oxygen? Yes   Flow (L/min) 2   SpO2 99 %   Pulse Oximetry Type Intermittent   $ Pulse Oximetry - Multiple Charge Pulse Oximetry - Multiple   Pulse 81   Resp 17   Inhaler   $ Inhaler Charges MDI (Metered Dose Inahler) Treatment   Daily Review of Necessity (Inhaler) completed   Respiratory Treatment Status (Inhaler) given   Treatment Route (Inhaler) mouthpiece   Patient Position (Inhaler) HOB elevated   Post Treatment Assessment (Inhaler) breath sounds unchanged   Signs of Intolerance (Inhaler) none

## 2020-07-28 NOTE — CONSULTS
"Pulmonary/Critical Care Consult      Patient name: Liset Bailon  MRN: [id number]  Date: 07/28/2020    Admit Date: 7/28/2020  Consult Requested By: Solomon Sethi MD    Reason for Consult: Dyspnea, chronic hypoxemic respiratory failure, JASKARAN, OHV, anemia    HPI:    7/28/2020 - 60 yo female well known to me with JASKARAN (recently got new CPAP with new settings and has been wearing), OHV, "mild asthma".  For the last few weeks she has felt more dyspneic and was to see me tomorrow but symptoms worsened and she came to ER and was found to be anemic and is admitted for further evaluation.  No fever, chills, sweats, cough, sputum, wheezing.  Has had some mild peripheral edema (better today).  No other new symptoms, no GI bleeding reported.  She states that she has been iron deficient in the past but has been off of iron for " a while".    Review of Systems    Review of Systems   Constitutional: Positive for malaise/fatigue. Negative for chills, diaphoresis, fever and weight loss.   HENT: Positive for hearing loss. Negative for congestion, ear discharge, ear pain, nosebleeds, sinus pain, sore throat and tinnitus.    Eyes: Negative for pain.   Respiratory: Positive for cough and shortness of breath. Negative for hemoptysis, sputum production, wheezing and stridor.    Cardiovascular: Positive for leg swelling. Negative for chest pain, palpitations, orthopnea, claudication and PND.   Gastrointestinal: Negative for abdominal pain, blood in stool, constipation, diarrhea, heartburn, nausea and vomiting.   Genitourinary: Negative for dysuria, frequency, hematuria and urgency.   Musculoskeletal: Negative for falls and myalgias.   Skin: Negative for itching and rash.   Neurological: Negative for dizziness, tingling, tremors, sensory change, speech change, focal weakness, seizures, loss of consciousness, weakness and headaches.   Psychiatric/Behavioral: Negative for depression, substance abuse and suicidal ideas. The patient is not " nervous/anxious.        Past Medical History    Past Medical History:   Diagnosis Date    Acute on chronic respiratory failure 2019    Arthritis     Asthma     Chronic bronchitis     Diabetes mellitus     Hyperlipidemia     Hypertension     Pain in joint of left shoulder region 2018    Sleep apnea     Thyroid disease        Past Surgical History    Past Surgical History:   Procedure Laterality Date    APPENDECTOMY       SECTION      x3    HYSTERECTOMY      OOPHORECTOMY Bilateral     age 42       Medications (scheduled):      atorvastatin  20 mg Oral QHS    diltiaZEM  120 mg Oral Daily    fluticasone furoate-vilanteroL  1 puff Inhalation Daily    furosemide (LASIX) IV  20 mg Intravenous Q12H    hydroCHLOROthiazide  25 mg Oral Daily    insulin aspart U-100  10 Units Subcutaneous TIDWM    insulin detemir U-100  30 Units Subcutaneous QHS    levothyroxine  25 mcg Oral Daily    magnesium oxide  400 mg Oral Daily    pantoprazole  40 mg Oral Daily    senna-docusate 8.6-50 mg  1 tablet Oral BID       Medications (infusions):         Medications (prn):     sodium chloride, acetaminophen, albuterol, dextrose 50%, dextrose 50%, glucagon (human recombinant), glucose, glucose, insulin aspart U-100, levalbuterol, magnesium oxide, magnesium oxide, ondansetron, potassium chloride 10%, potassium chloride 10%    Family History:   Family History   Problem Relation Age of Onset    Pancreatic cancer Mother     Breast cancer Neg Hx     Colon cancer Neg Hx     Ovarian cancer Neg Hx        Social History: Tobacco:   Social History     Tobacco Use   Smoking Status Never Smoker   Smokeless Tobacco Never Used                                EtOH:   Social History     Substance and Sexual Activity   Alcohol Use Yes    Comment: occasional                                Drugs:   Social History     Substance and Sexual Activity   Drug Use No                                Occupation: not working          "                    Asbestos exposure: no    Physical Exam    Vital signs:  Temp:  [97.8 °F (36.6 °C)-98.3 °F (36.8 °C)]   Pulse:  [77-94]   Resp:  [14-30]   BP: (123-165)/(56-75)   SpO2:  [79 %-100 %]     Intake/Output: No intake or output data in the 24 hours ending 07/28/20 1412     BMI: Estimated body mass index is 50.73 kg/m² as calculated from the following:    Height as of this encounter: 5' 2" (1.575 m).    Weight as of this encounter: 125.8 kg (277 lb 5.4 oz).    Physical Exam   Constitutional: She is oriented to person, place, and time. No distress.   Overweight female, nad, aao x 3   HENT:   Head: Normocephalic and atraumatic.   Right Ear: External ear normal.   Left Ear: External ear normal.   Eyes: Pupils are equal, round, and reactive to light. Conjunctivae and EOM are normal. Right eye exhibits no discharge. Left eye exhibits no discharge. No scleral icterus.   Neck: Normal range of motion. Neck supple. No JVD present. No tracheal deviation present. No thyromegaly present.   Cardiovascular: Normal rate, regular rhythm and intact distal pulses. Exam reveals no gallop and no friction rub.   Murmur heard.  Pulmonary/Chest: Effort normal and breath sounds normal. No stridor. No respiratory distress. She has no wheezes. She has no rales.   Decreased at bases   Abdominal: Soft. Bowel sounds are normal. She exhibits no distension. There is no abdominal tenderness. There is no rebound and no guarding.   obese   Musculoskeletal: Normal range of motion.         General: No tenderness or edema.      Comments: Wearing compression hose   Lymphadenopathy:     She has no cervical adenopathy.   Neurological: She is alert and oriented to person, place, and time.   Skin: Skin is warm and dry. She is not diaphoretic.   Psychiatric: Mood, memory, affect and judgment normal.   Nursing note and vitals reviewed.      Laboratory    Recent Labs   Lab 07/28/20  0955   WBC 9.09   RBC 3.67*   HGB 7.4*   HCT 27.9*      MCV " 76*   MCH 20.2*   MCHC 26.5*       Recent Labs   Lab 07/28/20  0955   CALCIUM 9.5   PROT 7.7      K 4.0   CO2 31*      BUN 25*   CREATININE 1.1   ALKPHOS 80   ALT 12   AST 12   BILITOT 0.4       Recent Labs   Lab 07/28/20  0955   INR 1.2       Recent Labs   Lab 07/28/20  0955   TROPONINI <0.030       Additional labs:     reviewed    Microbiology:       Microbiology Results (last 7 days)     ** No results found for the last 168 hours. **          Radiology    Imaging Results          X-Ray Chest AP Portable (Final result)  Result time 07/28/20 10:45:41    Final result by Cruzito Cesar MD (07/28/20 10:45:41)                 Narrative:    HISTORY: Dyspnea, acute on chronic respiratory failure.    FINDINGS: Portable chest radiograph at 1042 hours compared to  06/18/2020 shows the cardiac silhouette is enlarged, stable in size,  with the pulmonary vasculature stable and within normal limits.    The lungs are normally expanded, with no consolidation, large pleural  effusion, or evidence of pulmonary edema. No confluent infiltrates or  pneumothorax. No acute osseous abnormalities.    IMPRESSION: Stable cardiomegaly, with no evidence of acute  cardiopulmonary disease.    Electronically Signed by Cruzito CORTÉS on 7/28/2020 10:47 AM                              Additional Studies    EKG (7/27)  Vent. Rate : 087 BPM     Atrial Rate : 087 BPM      P-R Int : 164 ms          QRS Dur : 096 ms       QT Int : 364 ms       P-R-T Axes : 023 -44 079 degrees      QTc Int : 438 ms     Normal sinus rhythm   Left axis deviation   LVH with repolarization abnormality   Abnormal ECG   When compared with ECG of 27-NOV-2019 12:33,   Sinus rhythm has replaced Atrial fibrillation   Questionable change in QRS duration   (no S1Q3T3)    Ventilator Information              No results for input(s): PH, PCO2, PO2, HCO3, POCSATURATED, BE in the last 72 hours.      Impression    Active Hospital Problems    Diagnosis  POA    *Acute  anemia [D64.9]  Unknown    Obstructive sleep apnea syndrome [G47.33]  Yes     Priority: 3     Dyspnea [R06.00]  Yes     Priority: 4     Acute on chronic anemia [D64.9]  Yes    Mild intermittent asthma without complication [J45.20]  Unknown    Obesity hypoventilation syndrome [E66.2]  Yes    Respiratory failure with hypoxia and hypercapnia [J96.91, J96.92]  Yes    Morbid obesity [E66.01]  Yes      Resolved Hospital Problems   No resolved problems to display.       Plan    · Dyspnea worsened from her baseline likely due to anemia  · Agree with transfusion and GI workup - likely needs to be back on iron supplementation  · Asthma is mild by history and stable with no exacerbation  · Needs to have family bring her CPAP from home and continue to encourage compliance  · Needs weight loss/exercise  · Will recheck ECHO - has heart murmur and ECHO 8/2019 was OK    Thank you for this consult.  I will follow with you while the patient is hospitalized.  Please call ([phone]) if you have any questions.    Percy Guzmán MD

## 2020-07-29 LAB
ALBUMIN SERPL BCP-MCNC: 3.6 G/DL (ref 3.5–5.2)
ALP SERPL-CCNC: 75 U/L (ref 55–135)
ALT SERPL W/O P-5'-P-CCNC: 11 U/L (ref 10–44)
ANION GAP SERPL CALC-SCNC: 11 MMOL/L (ref 8–16)
AST SERPL-CCNC: 10 U/L (ref 10–40)
BASOPHILS # BLD AUTO: 0.03 K/UL (ref 0–0.2)
BASOPHILS NFR BLD: 0.4 % (ref 0–1.9)
BILIRUB SERPL-MCNC: 0.9 MG/DL (ref 0.1–1)
BLD PROD TYP BPU: NORMAL
BLOOD UNIT EXPIRATION DATE: NORMAL
BLOOD UNIT TYPE CODE: 6200
BLOOD UNIT TYPE: NORMAL
BUN SERPL-MCNC: 25 MG/DL (ref 6–20)
CALCIUM SERPL-MCNC: 8.8 MG/DL (ref 8.7–10.5)
CHLORIDE SERPL-SCNC: 96 MMOL/L (ref 95–110)
CO2 SERPL-SCNC: 32 MMOL/L (ref 23–29)
CODING SYSTEM: NORMAL
CREAT SERPL-MCNC: 0.8 MG/DL (ref 0.5–1.4)
DIFFERENTIAL METHOD: ABNORMAL
DISPENSE STATUS: NORMAL
EOSINOPHIL # BLD AUTO: 0.2 K/UL (ref 0–0.5)
EOSINOPHIL NFR BLD: 2.6 % (ref 0–8)
ERYTHROCYTE [DISTWIDTH] IN BLOOD BY AUTOMATED COUNT: 19.1 % (ref 11.5–14.5)
EST. GFR  (AFRICAN AMERICAN): >60 ML/MIN/1.73 M^2
EST. GFR  (NON AFRICAN AMERICAN): >60 ML/MIN/1.73 M^2
GLUCOSE SERPL-MCNC: 151 MG/DL (ref 70–110)
GLUCOSE SERPL-MCNC: 158 MG/DL (ref 70–110)
GLUCOSE SERPL-MCNC: 166 MG/DL (ref 70–110)
GLUCOSE SERPL-MCNC: 175 MG/DL (ref 70–110)
GLUCOSE SERPL-MCNC: 228 MG/DL (ref 70–110)
HCT VFR BLD AUTO: 29.1 % (ref 37–48.5)
HGB BLD-MCNC: 8.1 G/DL (ref 12–16)
IMM GRANULOCYTES # BLD AUTO: 0.04 K/UL (ref 0–0.04)
IMM GRANULOCYTES NFR BLD AUTO: 0.5 % (ref 0–0.5)
LYMPHOCYTES # BLD AUTO: 1.1 K/UL (ref 1–4.8)
LYMPHOCYTES NFR BLD: 14.8 % (ref 18–48)
MAGNESIUM SERPL-MCNC: 1.9 MG/DL (ref 1.6–2.6)
MCH RBC QN AUTO: 21.1 PG (ref 27–31)
MCHC RBC AUTO-ENTMCNC: 27.8 G/DL (ref 32–36)
MCV RBC AUTO: 76 FL (ref 82–98)
MONOCYTES # BLD AUTO: 0.4 K/UL (ref 0.3–1)
MONOCYTES NFR BLD: 5.3 % (ref 4–15)
NEUTROPHILS # BLD AUTO: 5.8 K/UL (ref 1.8–7.7)
NEUTROPHILS NFR BLD: 76.4 % (ref 38–73)
NRBC BLD-RTO: 1 /100 WBC
NUM UNITS TRANS PACKED RBC: NORMAL
PLATELET # BLD AUTO: 234 K/UL (ref 150–350)
PMV BLD AUTO: 9.7 FL (ref 9.2–12.9)
POTASSIUM SERPL-SCNC: 3.8 MMOL/L (ref 3.5–5.1)
PROT SERPL-MCNC: 7.1 G/DL (ref 6–8.4)
RBC # BLD AUTO: 3.83 M/UL (ref 4–5.4)
SODIUM SERPL-SCNC: 139 MMOL/L (ref 136–145)
WBC # BLD AUTO: 7.55 K/UL (ref 3.9–12.7)

## 2020-07-29 PROCEDURE — 99232 PR SUBSEQUENT HOSPITAL CARE,LEVL II: ICD-10-PCS | Mod: ,,, | Performed by: INTERNAL MEDICINE

## 2020-07-29 PROCEDURE — 94660 CPAP INITIATION&MGMT: CPT

## 2020-07-29 PROCEDURE — 82962 GLUCOSE BLOOD TEST: CPT

## 2020-07-29 PROCEDURE — 99232 SBSQ HOSP IP/OBS MODERATE 35: CPT | Mod: ,,, | Performed by: INTERNAL MEDICINE

## 2020-07-29 PROCEDURE — 80053 COMPREHEN METABOLIC PANEL: CPT

## 2020-07-29 PROCEDURE — 99900035 HC TECH TIME PER 15 MIN (STAT)

## 2020-07-29 PROCEDURE — 27000221 HC OXYGEN, UP TO 24 HOURS

## 2020-07-29 PROCEDURE — 63600175 PHARM REV CODE 636 W HCPCS: Performed by: INTERNAL MEDICINE

## 2020-07-29 PROCEDURE — 83735 ASSAY OF MAGNESIUM: CPT

## 2020-07-29 PROCEDURE — 63700000 PHARM REV CODE 250 ALT 637 W/O HCPCS: Performed by: INTERNAL MEDICINE

## 2020-07-29 PROCEDURE — 94640 AIRWAY INHALATION TREATMENT: CPT

## 2020-07-29 PROCEDURE — 99900031 HC PATIENT EDUCATION (STAT)

## 2020-07-29 PROCEDURE — P9016 RBC LEUKOCYTES REDUCED: HCPCS

## 2020-07-29 PROCEDURE — 85025 COMPLETE CBC W/AUTO DIFF WBC: CPT

## 2020-07-29 PROCEDURE — 36415 COLL VENOUS BLD VENIPUNCTURE: CPT

## 2020-07-29 PROCEDURE — 25000003 PHARM REV CODE 250: Performed by: INTERNAL MEDICINE

## 2020-07-29 PROCEDURE — 36430 TRANSFUSION BLD/BLD COMPNT: CPT

## 2020-07-29 PROCEDURE — 21400001 HC TELEMETRY ROOM

## 2020-07-29 PROCEDURE — 94761 N-INVAS EAR/PLS OXIMETRY MLT: CPT

## 2020-07-29 RX ORDER — POLYETHYLENE GLYCOL 3350, SODIUM CHLORIDE, SODIUM BICARBONATE, POTASSIUM CHLORIDE 420; 11.2; 5.72; 1.48 G/4L; G/4L; G/4L; G/4L
4000 POWDER, FOR SOLUTION ORAL ONCE
Status: DISCONTINUED | OUTPATIENT
Start: 2020-07-30 | End: 2020-07-30

## 2020-07-29 RX ORDER — FUROSEMIDE 10 MG/ML
20 INJECTION INTRAMUSCULAR; INTRAVENOUS ONCE
Status: COMPLETED | OUTPATIENT
Start: 2020-07-29 | End: 2020-07-29

## 2020-07-29 RX ORDER — HYDROCODONE BITARTRATE AND ACETAMINOPHEN 500; 5 MG/1; MG/1
TABLET ORAL
Status: DISCONTINUED | OUTPATIENT
Start: 2020-07-29 | End: 2020-08-01 | Stop reason: HOSPADM

## 2020-07-29 RX ADMIN — FUROSEMIDE 20 MG: 10 INJECTION, SOLUTION INTRAMUSCULAR; INTRAVENOUS at 07:07

## 2020-07-29 RX ADMIN — SENNOSIDES AND DOCUSATE SODIUM 1 TABLET: 8.6; 5 TABLET ORAL at 08:07

## 2020-07-29 RX ADMIN — INSULIN ASPART 10 UNITS: 100 INJECTION, SOLUTION INTRAVENOUS; SUBCUTANEOUS at 04:07

## 2020-07-29 RX ADMIN — LEVOTHYROXINE SODIUM 25 MCG: 25 TABLET ORAL at 07:07

## 2020-07-29 RX ADMIN — POTASSIUM CHLORIDE 40 MEQ: 20 SOLUTION ORAL at 07:07

## 2020-07-29 RX ADMIN — PANTOPRAZOLE SODIUM 40 MG: 40 TABLET, DELAYED RELEASE ORAL at 07:07

## 2020-07-29 RX ADMIN — FLUTICASONE FUROATE AND VILANTEROL TRIFENATATE 1 PUFF: 100; 25 POWDER RESPIRATORY (INHALATION) at 09:07

## 2020-07-29 RX ADMIN — INSULIN ASPART 2 UNITS: 100 INJECTION, SOLUTION INTRAVENOUS; SUBCUTANEOUS at 04:07

## 2020-07-29 RX ADMIN — INSULIN ASPART 2 UNITS: 100 INJECTION, SOLUTION INTRAVENOUS; SUBCUTANEOUS at 09:07

## 2020-07-29 RX ADMIN — HYDROCHLOROTHIAZIDE 25 MG: 25 TABLET ORAL at 08:07

## 2020-07-29 RX ADMIN — MAGNESIUM OXIDE 800 MG: 400 TABLET ORAL at 07:07

## 2020-07-29 RX ADMIN — DILTIAZEM HYDROCHLORIDE 120 MG: 120 CAPSULE, COATED, EXTENDED RELEASE ORAL at 08:07

## 2020-07-29 RX ADMIN — MAGNESIUM OXIDE 400 MG: 400 TABLET ORAL at 08:07

## 2020-07-29 RX ADMIN — ACETAMINOPHEN 650 MG: 325 TABLET, FILM COATED ORAL at 04:07

## 2020-07-29 RX ADMIN — FUROSEMIDE 20 MG: 10 INJECTION, SOLUTION INTRAMUSCULAR; INTRAVENOUS at 04:07

## 2020-07-29 RX ADMIN — ACETAMINOPHEN 650 MG: 325 TABLET, FILM COATED ORAL at 08:07

## 2020-07-29 RX ADMIN — INSULIN ASPART 10 UNITS: 100 INJECTION, SOLUTION INTRAVENOUS; SUBCUTANEOUS at 08:07

## 2020-07-29 RX ADMIN — INSULIN ASPART 10 UNITS: 100 INJECTION, SOLUTION INTRAVENOUS; SUBCUTANEOUS at 11:07

## 2020-07-29 RX ADMIN — INSULIN DETEMIR 30 UNITS: 100 INJECTION, SOLUTION SUBCUTANEOUS at 09:07

## 2020-07-29 RX ADMIN — ATORVASTATIN CALCIUM 20 MG: 20 TABLET, FILM COATED ORAL at 09:07

## 2020-07-29 RX ADMIN — INSULIN ASPART 2 UNITS: 100 INJECTION, SOLUTION INTRAVENOUS; SUBCUTANEOUS at 11:07

## 2020-07-29 NOTE — H&P
"[facility]  History & Physical    Patient Name: Liset Bailon  MRN: [id number]  Admission Date: 2020  Attending Physician: Solomon Sethi MD   Primary Care Provider: Christine Atkins NP         Patient information was obtained from patient and ER records.     Subjective:     Principal Problem:Acute anemia    Chief Complaint:   Chief Complaint   Patient presents with    Shortness of Breath     X "AWHILE", MONTHS        HPI: Patient getting admitted with acute on chronic resp failure  Patient is on Home O2 and CPAP at night  For past few days she has been experiencing dyspnea on exertion  Later symptoms went worse and she came to Hospital and got admitted  FOBT was positive and pt on NOAC  She denies any juan miguel GI bleed . Hb levels found to be on lower range     Past Medical History:   Diagnosis Date    Acute on chronic respiratory failure 2019    Arthritis     Asthma     Chronic bronchitis     Diabetes mellitus     Hyperlipidemia     Hypertension     Pain in joint of left shoulder region 2018    Sleep apnea     Thyroid disease        Past Surgical History:   Procedure Laterality Date    APPENDECTOMY       SECTION      x3    HYSTERECTOMY      OOPHORECTOMY Bilateral     age 42       Review of patient's allergies indicates:   Allergen Reactions    Benadryl allergy decongestant     Codeine        No current facility-administered medications on file prior to encounter.      Current Outpatient Medications on File Prior to Encounter   Medication Sig    albuterol (PROAIR HFA) 90 mcg/actuation inhaler Inhale 2 puffs into the lungs every 6 (six) hours as needed for Wheezing. Rescue    apixaban (ELIQUIS) 5 mg Tab Take 1 tablet (5 mg total) by mouth 2 (two) times daily.    aspirin (ASPIR-81 ORAL) Take 1 tablet by mouth once daily.    atorvastatin (LIPITOR) 20 MG tablet Take 20 mg by mouth once daily.     cholecalciferol, vitamin D3, 1,250 mcg (50,000 unit) capsule " Take 1 each by mouth twice a week. Monday and Tuesdays    diltiaZEM (CARDIZEM CD) 120 MG Cp24 Take 1 capsule (120 mg total) by mouth once daily.    gabapentin (NEURONTIN) 300 MG capsule Take 600 mg by mouth 2 (two) times daily.    glimepiride (AMARYL) 4 MG tablet Take 4 mg by mouth before breakfast.    hydrochlorothiazide (HYDRODIURIL) 25 MG tablet Take 25 mg by mouth once daily.     insulin aspart U-100 (NOVOLOG FLEXPEN U-100 INSULIN) 100 unit/mL (3 mL) InPn pen Inject 25 Units into the skin 3 (three) times daily. Pt states sliding scale    irbesartan (AVAPRO) 300 MG tablet Take 300 mg by mouth once daily.     levalbuterol (XOPENEX) 0.63 mg/3 mL nebulizer solution Take 1 ampule by nebulization 3 (three) times daily as needed for Wheezing. Rescue    levothyroxine (SYNTHROID) 25 MCG tablet Take 25 mcg by mouth once daily.     liraglutide 0.6 mg/0.1 mL, 18 mg/3 mL, subq PNIJ (VICTOZA 3-KAM) 0.6 mg/0.1 mL (18 mg/3 mL) PnIj Inject 1.8 mg into the skin once daily.     magnesium oxide (MAG-OX) 400 mg (241.3 mg magnesium) tablet Take 400 mg by mouth once daily.    metformin (GLUCOPHAGE) 500 MG tablet Take 500 mg by mouth 2 (two) times daily with meals.     omeprazole (PRILOSEC) 20 MG capsule Take 20 mg by mouth once daily.     BD INSULIN SYRINGE ULTRA-FINE 0.5 mL 31 gauge x 5/16 Syrg USE TO INJECT 4X DAILY    blood sugar diagnostic (FREESTYLE LITE STRIPS MISC) FreeStyle Lite Strips   USE UTD QID    fluticasone-salmeterol diskus inhaler 250-50 mcg INHALE ONE PUFF INTO THE LUNGS TWICE DAILY    FREESTYLE INSULINX TEST STRIPS Strp TEST BLOOD SUGAR QID    FREESTYLE LANCETS 28 gauge lancets TEST QID    hyaluronate sod, cross-linked (GEL-ONE) 30 mg/3 mL Syrg 3 mLs.    hylan g-f 20 (SYNVISC-ONE) 48 mg/6 mL Syrg 6 mLs.    insulin (BASAGLAR KWIKPEN U-100 INSULIN) glargine 100 units/mL (3mL) SubQ pen Inject 35 Units into the skin once daily.     insulin syringe-needle U-100 (BD INSULIN SYRINGE ULTRA-FINE) 0.5 mL  "31 gauge x 5/16" Syrg BD Insulin Syringe Ultra-Fine 0.5 mL 31 gauge x 5/16"    lancets (FREESTYLE LANCETS) 28 gauge Misc FreeStyle Lancets 28 gauge    pen needle, diabetic (BD ULTRA-FINE SUSHANT PEN NEEDLE) 32 gauge x 5/32" Ndle BD Ultra-Fine Sushant Pen Needle 32 gauge x 5/32"    sodium hyaluronate, orthovisc, (ORTHOVISC) 30 mg/2 mL Syrg ORTHOVISC 30 mg/2 mL intra-articular syringe    [DISCONTINUED] BASAGLAR KWIKPEN U-100 INSULIN glargine 100 units/mL (3mL) SubQ pen INJECT SUBCUTANEOUS 35 UNITS EVERY MORNING    [DISCONTINUED] benzonatate (TESSALON) 100 MG capsule benzonatate 100 mg capsule    [DISCONTINUED] cyclobenzaprine (FLEXERIL) 10 MG tablet cyclobenzaprine 10 mg tablet   TK 1 T PO TID PRF MUSCLE SPASMS    [DISCONTINUED] ergocalciferol (ERGOCALCIFEROL) 50,000 unit Cap ergocalciferol (vitamin D2) 50,000 unit capsule    [DISCONTINUED] gabapentin (NEURONTIN) 300 MG capsule TAKE ONE CAPSULE BY MOUTH IN THE MORNING, ONE CAPSULE AT NOON, AND 2 CAPSULES BEFORE BED    [DISCONTINUED] glimepiride (AMARYL) 4 MG tablet Take 4 mg by mouth 2 (two) times daily.     [DISCONTINUED] insulin aspart U-100 (NOVOLOG FLEXPEN U-100 INSULIN) 100 unit/mL InPn pen Novolog Flexpen U-100 Insulin aspart 100 unit/mL subcutaneous   INJECT 15 UNITS SUBCUTANEOUS THREE TIMES A DAY    [DISCONTINUED] levalbuterol (XOPENEX) 0.63 mg/3 mL nebulizer solution USE 1 VIAL VIA NEBULIZER THREE TIMES DAILY AS NEEDED FOR WHEEZING (Patient taking differently: 1 ampule. USE 1 VIAL VIA NEBULIZER THREE TIMES DAILY AS NEEDED FOR WHEEZING)    [DISCONTINUED] meloxicam (MOBIC) 15 MG tablet meloxicam 15 mg tablet    [DISCONTINUED] nystatin-triamcinolone (MYCOLOG II) cream Apply to affected area 2 times daily    [DISCONTINUED] tizanidine (ZANAFLEX) 2 MG tablet TK 1 T PO Q 8 H PRN     Family History     Problem Relation (Age of Onset)    Pancreatic cancer Mother        Tobacco Use    Smoking status: Never Smoker    Smokeless tobacco: Never Used   Substance " and Sexual Activity    Alcohol use: Yes     Comment: occasional    Drug use: No    Sexual activity: Never     Partners: Male     Birth control/protection: See Surgical Hx     Review of Systems   Constitutional: Negative for activity change and appetite change.   HENT: Negative for congestion and dental problem.    Eyes: Negative for discharge and itching.   Respiratory: Positive for shortness of breath.    Cardiovascular: Negative for chest pain.   Gastrointestinal: Negative for abdominal distention and abdominal pain.   Endocrine: Negative for cold intolerance.   Genitourinary: Negative for difficulty urinating and dysuria.   Musculoskeletal: Negative for arthralgias and back pain.   Skin: Negative for color change.   Neurological: Negative for dizziness and facial asymmetry.   Hematological: Negative for adenopathy.   Psychiatric/Behavioral: Negative for agitation and behavioral problems.     Objective:     Vital Signs (Most Recent):  Temp: 98.5 °F (36.9 °C) (07/28/20 1921)  Pulse: 80 (07/28/20 1921)  Resp: 18 (07/28/20 1921)  BP: 118/65 (07/28/20 1921)  SpO2: (!) 89 % (07/28/20 1921) Vital Signs (24h Range):  Temp:  [97.8 °F (36.6 °C)-98.5 °F (36.9 °C)] 98.5 °F (36.9 °C)  Pulse:  [75-94] 80  Resp:  [14-30] 18  SpO2:  [79 %-100 %] 89 %  BP: (112-165)/(55-75) 118/65     Weight: 125.8 kg (277 lb 5.4 oz)  Body mass index is 50.73 kg/m².    Physical Exam  Vitals signs and nursing note reviewed.   Constitutional:       General: She is not in acute distress.  HENT:      Head: Normocephalic.      Right Ear: External ear normal.      Left Ear: External ear normal.      Nose: Nose normal.      Mouth/Throat:      Mouth: Mucous membranes are moist.   Eyes:      Pupils: Pupils are equal, round, and reactive to light.   Neck:      Musculoskeletal: Neck supple.   Cardiovascular:      Rate and Rhythm: Normal rate.   Pulmonary:      Effort: Pulmonary effort is normal.   Abdominal:      General: There is no distension.    Musculoskeletal: Normal range of motion.   Skin:     General: Skin is warm.   Neurological:      Mental Status: She is alert and oriented to person, place, and time.   Psychiatric:         Mood and Affect: Mood normal.           CRANIAL NERVES     CN III, IV, VI   Pupils are equal, round, and reactive to light.       Significant Labs:   CBC:   Recent Labs   Lab 07/28/20  0955   WBC 9.09   HGB 7.4*   HCT 27.9*        CMP:   Recent Labs   Lab 07/28/20  0955      K 4.0      CO2 31*   *   BUN 25*   CREATININE 1.1   CALCIUM 9.5   PROT 7.7   ALBUMIN 4.0   BILITOT 0.4   ALKPHOS 80   AST 12   ALT 12   ANIONGAP 10   EGFRNONAA 55.9*       Significant Imaging: I have reviewed all pertinent imaging results/findings within the past 24 hours.    Assessment/Plan:     * Acute anemia  Hold NOAC  FOBT positive  Consult GI MD   Transfuse pRBC along with lasix      Paroxysmal atrial fibrillation  Hold NOAC  Continue other management      Respiratory failure with hypoxia and hypercapnia  Acute on chronic resp failure due to acute anemia  Plan as above      Morbid obesity  Need Therapeutic life style changes      Mild intermittent asthma without complication  Stable issue      Obesity hypoventilation syndrome  Along with JASKARAN  On CPAP      Type 2 diabetes mellitus, with long-term current use of insulin  Continue insulin regime        VTE Risk Mitigation (From admission, onward)         Ordered     IP VTE HIGH RISK PATIENT  Once      07/28/20 1118     Place sequential compression device  Until discontinued      07/28/20 1118                   Solomon Sethi MD  Department of Hospital Medicine   [facility]

## 2020-07-29 NOTE — SUBJECTIVE & OBJECTIVE
Past Medical History:   Diagnosis Date    Acute on chronic respiratory failure 2019    Arthritis     Asthma     Chronic bronchitis     Diabetes mellitus     Hyperlipidemia     Hypertension     Pain in joint of left shoulder region 2018    Sleep apnea     Thyroid disease        Past Surgical History:   Procedure Laterality Date    APPENDECTOMY       SECTION      x3    HYSTERECTOMY      OOPHORECTOMY Bilateral     age 42       Review of patient's allergies indicates:   Allergen Reactions    Benadryl allergy decongestant     Codeine        No current facility-administered medications on file prior to encounter.      Current Outpatient Medications on File Prior to Encounter   Medication Sig    albuterol (PROAIR HFA) 90 mcg/actuation inhaler Inhale 2 puffs into the lungs every 6 (six) hours as needed for Wheezing. Rescue    apixaban (ELIQUIS) 5 mg Tab Take 1 tablet (5 mg total) by mouth 2 (two) times daily.    aspirin (ASPIR-81 ORAL) Take 1 tablet by mouth once daily.    atorvastatin (LIPITOR) 20 MG tablet Take 20 mg by mouth once daily.     cholecalciferol, vitamin D3, 1,250 mcg (50,000 unit) capsule Take 1 each by mouth twice a week. Monday and     diltiaZEM (CARDIZEM CD) 120 MG Cp24 Take 1 capsule (120 mg total) by mouth once daily.    gabapentin (NEURONTIN) 300 MG capsule Take 600 mg by mouth 2 (two) times daily.    glimepiride (AMARYL) 4 MG tablet Take 4 mg by mouth before breakfast.    hydrochlorothiazide (HYDRODIURIL) 25 MG tablet Take 25 mg by mouth once daily.     insulin aspart U-100 (NOVOLOG FLEXPEN U-100 INSULIN) 100 unit/mL (3 mL) InPn pen Inject 25 Units into the skin 3 (three) times daily. Pt states sliding scale    irbesartan (AVAPRO) 300 MG tablet Take 300 mg by mouth once daily.     levalbuterol (XOPENEX) 0.63 mg/3 mL nebulizer solution Take 1 ampule by nebulization 3 (three) times daily as needed for Wheezing. Rescue    levothyroxine (SYNTHROID) 25  "MCG tablet Take 25 mcg by mouth once daily.     liraglutide 0.6 mg/0.1 mL, 18 mg/3 mL, subq PNIJ (VICTOZA 3-KAM) 0.6 mg/0.1 mL (18 mg/3 mL) PnIj Inject 1.8 mg into the skin once daily.     magnesium oxide (MAG-OX) 400 mg (241.3 mg magnesium) tablet Take 400 mg by mouth once daily.    metformin (GLUCOPHAGE) 500 MG tablet Take 500 mg by mouth 2 (two) times daily with meals.     omeprazole (PRILOSEC) 20 MG capsule Take 20 mg by mouth once daily.     BD INSULIN SYRINGE ULTRA-FINE 0.5 mL 31 gauge x 5/16 Syrg USE TO INJECT 4X DAILY    blood sugar diagnostic (FREESTYLE LITE STRIPS MISC) FreeStyle Lite Strips   USE UTD QID    fluticasone-salmeterol diskus inhaler 250-50 mcg INHALE ONE PUFF INTO THE LUNGS TWICE DAILY    FREESTYLE INSULINX TEST STRIPS Strp TEST BLOOD SUGAR QID    FREESTYLE LANCETS 28 gauge lancets TEST QID    hyaluronate sod, cross-linked (GEL-ONE) 30 mg/3 mL Syrg 3 mLs.    hylan g-f 20 (SYNVISC-ONE) 48 mg/6 mL Syrg 6 mLs.    insulin (BASAGLAR KWIKPEN U-100 INSULIN) glargine 100 units/mL (3mL) SubQ pen Inject 35 Units into the skin once daily.     insulin syringe-needle U-100 (BD INSULIN SYRINGE ULTRA-FINE) 0.5 mL 31 gauge x 5/16" Syrg BD Insulin Syringe Ultra-Fine 0.5 mL 31 gauge x 5/16"    lancets (FREESTYLE LANCETS) 28 gauge Misc FreeStyle Lancets 28 gauge    pen needle, diabetic (BD ULTRA-FINE SUSHANT PEN NEEDLE) 32 gauge x 5/32" Ndle BD Ultra-Fine Sushant Pen Needle 32 gauge x 5/32"    sodium hyaluronate, orthovisc, (ORTHOVISC) 30 mg/2 mL Syrg ORTHOVISC 30 mg/2 mL intra-articular syringe    [DISCONTINUED] BASAGLAR KWIKPEN U-100 INSULIN glargine 100 units/mL (3mL) SubQ pen INJECT SUBCUTANEOUS 35 UNITS EVERY MORNING    [DISCONTINUED] benzonatate (TESSALON) 100 MG capsule benzonatate 100 mg capsule    [DISCONTINUED] cyclobenzaprine (FLEXERIL) 10 MG tablet cyclobenzaprine 10 mg tablet   TK 1 T PO TID PRF MUSCLE SPASMS    [DISCONTINUED] ergocalciferol (ERGOCALCIFEROL) 50,000 unit Cap " ergocalciferol (vitamin D2) 50,000 unit capsule    [DISCONTINUED] gabapentin (NEURONTIN) 300 MG capsule TAKE ONE CAPSULE BY MOUTH IN THE MORNING, ONE CAPSULE AT NOON, AND 2 CAPSULES BEFORE BED    [DISCONTINUED] glimepiride (AMARYL) 4 MG tablet Take 4 mg by mouth 2 (two) times daily.     [DISCONTINUED] insulin aspart U-100 (NOVOLOG FLEXPEN U-100 INSULIN) 100 unit/mL InPn pen Novolog Flexpen U-100 Insulin aspart 100 unit/mL subcutaneous   INJECT 15 UNITS SUBCUTANEOUS THREE TIMES A DAY    [DISCONTINUED] levalbuterol (XOPENEX) 0.63 mg/3 mL nebulizer solution USE 1 VIAL VIA NEBULIZER THREE TIMES DAILY AS NEEDED FOR WHEEZING (Patient taking differently: 1 ampule. USE 1 VIAL VIA NEBULIZER THREE TIMES DAILY AS NEEDED FOR WHEEZING)    [DISCONTINUED] meloxicam (MOBIC) 15 MG tablet meloxicam 15 mg tablet    [DISCONTINUED] nystatin-triamcinolone (MYCOLOG II) cream Apply to affected area 2 times daily    [DISCONTINUED] tizanidine (ZANAFLEX) 2 MG tablet TK 1 T PO Q 8 H PRN     Family History     Problem Relation (Age of Onset)    Pancreatic cancer Mother        Tobacco Use    Smoking status: Never Smoker    Smokeless tobacco: Never Used   Substance and Sexual Activity    Alcohol use: Yes     Comment: occasional    Drug use: No    Sexual activity: Never     Partners: Male     Birth control/protection: See Surgical Hx     Review of Systems   Constitutional: Negative for activity change and appetite change.   HENT: Negative for congestion and dental problem.    Eyes: Negative for discharge and itching.   Respiratory: Positive for shortness of breath.    Cardiovascular: Negative for chest pain.   Gastrointestinal: Negative for abdominal distention and abdominal pain.   Endocrine: Negative for cold intolerance.   Genitourinary: Negative for difficulty urinating and dysuria.   Musculoskeletal: Negative for arthralgias and back pain.   Skin: Negative for color change.   Neurological: Negative for dizziness and facial  asymmetry.   Hematological: Negative for adenopathy.   Psychiatric/Behavioral: Negative for agitation and behavioral problems.     Objective:     Vital Signs (Most Recent):  Temp: 98.5 °F (36.9 °C) (07/28/20 1921)  Pulse: 80 (07/28/20 1921)  Resp: 18 (07/28/20 1921)  BP: 118/65 (07/28/20 1921)  SpO2: (!) 89 % (07/28/20 1921) Vital Signs (24h Range):  Temp:  [97.8 °F (36.6 °C)-98.5 °F (36.9 °C)] 98.5 °F (36.9 °C)  Pulse:  [75-94] 80  Resp:  [14-30] 18  SpO2:  [79 %-100 %] 89 %  BP: (112-165)/(55-75) 118/65     Weight: 125.8 kg (277 lb 5.4 oz)  Body mass index is 50.73 kg/m².    Physical Exam  Vitals signs and nursing note reviewed.   Constitutional:       General: She is not in acute distress.  HENT:      Head: Normocephalic.      Right Ear: External ear normal.      Left Ear: External ear normal.      Nose: Nose normal.      Mouth/Throat:      Mouth: Mucous membranes are moist.   Eyes:      Pupils: Pupils are equal, round, and reactive to light.   Neck:      Musculoskeletal: Neck supple.   Cardiovascular:      Rate and Rhythm: Normal rate.   Pulmonary:      Effort: Pulmonary effort is normal.   Abdominal:      General: There is no distension.   Musculoskeletal: Normal range of motion.   Skin:     General: Skin is warm.   Neurological:      Mental Status: She is alert and oriented to person, place, and time.   Psychiatric:         Mood and Affect: Mood normal.           CRANIAL NERVES     CN III, IV, VI   Pupils are equal, round, and reactive to light.       Significant Labs:   CBC:   Recent Labs   Lab 07/28/20  0955   WBC 9.09   HGB 7.4*   HCT 27.9*        CMP:   Recent Labs   Lab 07/28/20  0955      K 4.0      CO2 31*   *   BUN 25*   CREATININE 1.1   CALCIUM 9.5   PROT 7.7   ALBUMIN 4.0   BILITOT 0.4   ALKPHOS 80   AST 12   ALT 12   ANIONGAP 10   EGFRNONAA 55.9*       Significant Imaging: I have reviewed all pertinent imaging results/findings within the past 24 hours.

## 2020-07-29 NOTE — PLAN OF CARE
07/29/20 0957   Patient Assessment/Suction   Level of Consciousness (AVPU) alert   Respiratory Effort Normal;Unlabored   All Lung Fields Breath Sounds clear;diminished   PRE-TX-O2   O2 Device (Oxygen Therapy) nasal cannula   $ Is the patient on Low Flow Oxygen? Yes   Flow (L/min) 2   SpO2 95 %   Pulse Oximetry Type Intermittent   $ Pulse Oximetry - Multiple Charge Pulse Oximetry - Multiple   Pulse 85   Resp 18   Aerosol Therapy   $ Aerosol Therapy Charges PRN treatment not required   Respiratory Treatment Status (SVN) PRN treatment not required   Inhaler   $ Inhaler Charges MDI (Metered Dose Inahler) Treatment   Daily Review of Necessity (Inhaler) completed   Respiratory Treatment Status (Inhaler) given   Treatment Route (Inhaler) mouthpiece   Patient Position (Inhaler) HOB elevated   Post Treatment Assessment (Inhaler) breath sounds improved   Signs of Intolerance (Inhaler) none   Respiratory Evaluation   $ Care Plan Tech Time 15 min

## 2020-07-29 NOTE — HPI
Patient getting admitted with acute on chronic resp failure  Patient is on Home O2 and CPAP at night  For past few days she has been experiencing dyspnea on exertion  Later symptoms went worse and she came to Hospital and got admitted  FOBT was positive and pt on NOAC  She denies any juan miguel GI bleed . Hb levels found to be on lower range

## 2020-07-29 NOTE — PROGRESS NOTES
"Pulmonary/Critical Care Progress Note      Patient name: Liset Bailon  MRN: [id number]  Date: 07/29/2020    Admit Date: 7/28/2020  Consult Requested By: Solomon Sethi MD    Reason for Consult: Dyspnea, chronic hypoxemic respiratory failure, JASKARAN, OHV, anemia    HPI:    7/28/2020 - 60 yo female well known to me with JASKARAN (recently got new CPAP with new settings and has been wearing), OHV, "mild asthma".  For the last few weeks she has felt more dyspneic and was to see me tomorrow but symptoms worsened and she came to ER and was found to be anemic and is admitted for further evaluation.  No fever, chills, sweats, cough, sputum, wheezing.  Has had some mild peripheral edema (better today).  No other new symptoms, no GI bleeding reported.  She states that she has been iron deficient in the past but has been off of iron for " a while".    7/29/2020 - Stable overnight, got 2 U PRBC but hgb only increased 7.4 - 8.1.  She has not been very active yet so difficult to assess her dyspnea.  She denies any melena, BRBPR, hematochezia.  She states that she was seen by GI and they are considering upper and lower endoscopy.  No new complaints today.    Review of Systems    Review of Systems   Constitutional: Positive for malaise/fatigue. Negative for chills, diaphoresis, fever and weight loss.   HENT: Positive for hearing loss. Negative for congestion, ear discharge, ear pain, nosebleeds, sinus pain, sore throat and tinnitus.    Eyes: Negative for pain.   Respiratory: Positive for cough and shortness of breath. Negative for hemoptysis, sputum production, wheezing and stridor.    Cardiovascular: Positive for leg swelling. Negative for chest pain, palpitations, orthopnea, claudication and PND.   Gastrointestinal: Negative for abdominal pain, blood in stool, constipation, diarrhea, heartburn, nausea and vomiting.   Genitourinary: Negative for dysuria, frequency, hematuria and urgency.   Musculoskeletal: Negative for falls and myalgias. "   Skin: Negative for itching and rash.   Neurological: Negative for dizziness, tingling, tremors, sensory change, speech change, focal weakness, seizures, loss of consciousness, weakness and headaches.   Psychiatric/Behavioral: Negative for depression, substance abuse and suicidal ideas. The patient is not nervous/anxious.        Past Medical History    Past Medical History:   Diagnosis Date    Acute on chronic respiratory failure 2019    Arthritis     Asthma     Chronic bronchitis     Diabetes mellitus     Hyperlipidemia     Hypertension     Pain in joint of left shoulder region 2018    Sleep apnea     Thyroid disease        Past Surgical History    Past Surgical History:   Procedure Laterality Date    APPENDECTOMY       SECTION      x3    HYSTERECTOMY      OOPHORECTOMY Bilateral     age 42       Medications (scheduled):      atorvastatin  20 mg Oral QHS    diltiaZEM  120 mg Oral Daily    fluticasone furoate-vilanteroL  1 puff Inhalation Daily    hydroCHLOROthiazide  25 mg Oral Daily    insulin aspart U-100  10 Units Subcutaneous TIDWM    insulin detemir U-100  30 Units Subcutaneous QHS    levothyroxine  25 mcg Oral Daily    magnesium oxide  400 mg Oral Daily    pantoprazole  40 mg Oral Daily    senna-docusate 8.6-50 mg  1 tablet Oral BID       Medications (infusions):         Medications (prn):     sodium chloride, acetaminophen, albuterol, dextrose 50%, dextrose 50%, glucagon (human recombinant), glucose, glucose, insulin aspart U-100, levalbuterol, magnesium oxide, magnesium oxide, ondansetron, potassium chloride 10%, potassium chloride 10%    Family History:   Family History   Problem Relation Age of Onset    Pancreatic cancer Mother     Breast cancer Neg Hx     Colon cancer Neg Hx     Ovarian cancer Neg Hx        Social History: Tobacco:   Social History     Tobacco Use   Smoking Status Never Smoker   Smokeless Tobacco Never Used                                EtOH:  "  Social History     Substance and Sexual Activity   Alcohol Use Yes    Comment: occasional                                Drugs:   Social History     Substance and Sexual Activity   Drug Use No                                Occupation: not working                             Asbestos exposure: no    Physical Exam    Vital signs:  Temp:  [97.6 °F (36.4 °C)-98.5 °F (36.9 °C)]   Pulse:  [70-94]   Resp:  [14-30]   BP: (112-165)/(55-79)   SpO2:  [79 %-100 %]     Intake/Output:     Intake/Output Summary (Last 24 hours) at 7/29/2020 0806  Last data filed at 7/29/2020 0500  Gross per 24 hour   Intake 864.33 ml   Output 400 ml   Net 464.33 ml        BMI: Estimated body mass index is 51.81 kg/m² as calculated from the following:    Height as of an earlier encounter on 7/28/20: 5' 2" (1.575 m).    Weight as of this encounter: 128.5 kg (283 lb 4.7 oz).    Physical Exam   Constitutional: She is oriented to person, place, and time. No distress.   Overweight female, nad, aao x 3   HENT:   Head: Normocephalic and atraumatic.   Right Ear: External ear normal.   Left Ear: External ear normal.   Eyes: Pupils are equal, round, and reactive to light. Conjunctivae and EOM are normal. Right eye exhibits no discharge. Left eye exhibits no discharge. No scleral icterus.   Neck: Normal range of motion. Neck supple. No JVD present. No tracheal deviation present. No thyromegaly present.   Cardiovascular: Normal rate, regular rhythm and intact distal pulses. Exam reveals no gallop and no friction rub.   Murmur heard.  Pulmonary/Chest: Effort normal and breath sounds normal. No stridor. No respiratory distress. She has no wheezes. She has no rales.   Decreased at bases   Abdominal: Soft. Bowel sounds are normal. She exhibits no distension. There is no abdominal tenderness. There is no rebound and no guarding.   obese   Musculoskeletal: Normal range of motion.         General: No tenderness or edema.      Comments: Wearing compression hose "   Lymphadenopathy:     She has no cervical adenopathy.   Neurological: She is alert and oriented to person, place, and time.   Skin: Skin is warm and dry. She is not diaphoretic.   Psychiatric: Mood, memory, affect and judgment normal.   Nursing note and vitals reviewed.      Laboratory    Recent Labs   Lab 07/29/20  0456   WBC 7.55   RBC 3.83*   HGB 8.1*   HCT 29.1*      MCV 76*   MCH 21.1*   MCHC 27.8*       Recent Labs   Lab 07/29/20  0456   CALCIUM 8.8   PROT 7.1      K 3.8   CO2 32*   CL 96   BUN 25*   CREATININE 0.8   ALKPHOS 75   ALT 11   AST 10   BILITOT 0.9       Recent Labs   Lab 07/28/20  0955   INR 1.2       Recent Labs   Lab 07/28/20  0955   TROPONINI <0.030       Additional labs:     reviewed    Microbiology:       Microbiology Results (last 7 days)     ** No results found for the last 168 hours. **          Radiology        Additional Studies    EKG (7/27)  Vent. Rate : 087 BPM     Atrial Rate : 087 BPM      P-R Int : 164 ms          QRS Dur : 096 ms       QT Int : 364 ms       P-R-T Axes : 023 -44 079 degrees      QTc Int : 438 ms     Normal sinus rhythm   Left axis deviation   LVH with repolarization abnormality   Abnormal ECG   When compared with ECG of 27-NOV-2019 12:33,   Sinus rhythm has replaced Atrial fibrillation   Questionable change in QRS duration   (no S1Q3T3)    ECHO (7/28)  · Normal left ventricular systolic function. The estimated ejection fraction is 55%.  · Normal LV diastolic function.  · No wall motion abnormalities.  · Normal right ventricular systolic function.  · Mild tricuspid regurgitation.  · No pulmonary hypertension present.  · Normal central venous pressure (3 mmHg).    Ventilator Information              No results for input(s): PH, PCO2, PO2, HCO3, POCSATURATED, BE in the last 72 hours.      Impression    Active Hospital Problems    Diagnosis  POA    *Acute anemia [D64.9]  Unknown    Paroxysmal atrial fibrillation [I48.0]  Yes     Priority: 5     Mild  intermittent asthma without complication [J45.20]  Yes    Obesity hypoventilation syndrome [E66.2]  Yes    Respiratory failure with hypoxia and hypercapnia [J96.91, J96.92]  Yes    Type 2 diabetes mellitus, with long-term current use of insulin [E11.9, Z79.4]  Not Applicable    Morbid obesity [E66.01]  Yes      Resolved Hospital Problems   No resolved problems to display.       Plan    · Dyspnea worsened from her baseline likely due to anemia  · Await GI workup - likely needs to be back on iron supplementation  · ? Transfuse one more unit (did not get the expected hgb bump from her transfusions)  · Await decision on endoscopy  · Asthma is mild by history and stable with no exacerbation  · Needs to have family bring her CPAP from home and continue to encourage compliance  · Needs weight loss/exercise  · ECHO noted    Thank you for this consult.  I will follow with you while the patient is hospitalized.  Please call ([phone]) if you have any questions.    Percy Guzmán MD

## 2020-07-29 NOTE — CONSULTS
Chief Complaint:  Iron deficiency anemia    HPI:  59 year old female with history of chronic progressive severe iron deficiency anemia not associated with overt bleeding present since 2019. Reports distant history of colonoscopy.  Results unknown. Denies heavy nsaids.  Feels well but short of breath.  No complaints otherwise.      Results for MILLER HOPPER (MRN [id number]) as of 2020 16:40   Ref. Range 2019 18:48 2019 06:13 2019 12:38 2019 03:43 2019 04:24 2020 12:28 2020 09:55 2020 04:56   Hemoglobin Latest Ref Range: 12.0 - 16.0 g/dL 12.5 11.8 (L) 10.9 (L) 10.2 (L) 10.2 (L) 8.7 (L) 7.4 (L) 8.1 (L)   Hematocrit Latest Ref Range: 37.0 - 48.5 % 41.9 38.1 38.1 36.2 (L) 35.6 (L) 31.5 (L) 27.9 (L) 29.1 (L)       Review of Systems:  Complete ROS performed and negative unless stated above in HPI          Past Medical History:   Diagnosis Date    Acute on chronic respiratory failure 2019    Arthritis     Asthma     Chronic bronchitis     Diabetes mellitus     Hyperlipidemia     Hypertension     Pain in joint of left shoulder region 2018    Sleep apnea     Thyroid disease        Past Surgical History:   Procedure Laterality Date    APPENDECTOMY       SECTION      x3    HYSTERECTOMY      OOPHORECTOMY Bilateral     age 42       Family History   Problem Relation Age of Onset    Pancreatic cancer Mother     Breast cancer Neg Hx     Colon cancer Neg Hx     Ovarian cancer Neg Hx        Social History     Socioeconomic History    Marital status:      Spouse name: Not on file    Number of children: Not on file    Years of education: Not on file    Highest education level: Not on file   Occupational History    Not on file   Social Needs    Financial resource strain: Not on file    Food insecurity     Worry: Not on file     Inability: Not on file    Transportation needs     Medical: Not on file     Non-medical: Not on file   Tobacco Use     Smoking status: Never Smoker    Smokeless tobacco: Never Used   Substance and Sexual Activity    Alcohol use: Yes     Comment: occasional    Drug use: No    Sexual activity: Never     Partners: Male     Birth control/protection: See Surgical Hx   Lifestyle    Physical activity     Days per week: Not on file     Minutes per session: Not on file    Stress: Not on file   Relationships    Social connections     Talks on phone: Not on file     Gets together: Not on file     Attends [employer] service: Not on file     Active member of club or organization: Not on file     Attends meetings of clubs or organizations: Not on file     Relationship status: Not on file   Other Topics Concern    Not on file   Social History Narrative    Not on file       Review of patient's allergies indicates:   Allergen Reactions    Benadryl allergy decongestant     Codeine        No current facility-administered medications on file prior to encounter.      Current Outpatient Medications on File Prior to Encounter   Medication Sig Dispense Refill    albuterol (PROAIR HFA) 90 mcg/actuation inhaler Inhale 2 puffs into the lungs every 6 (six) hours as needed for Wheezing. Rescue 18 g 11    apixaban (ELIQUIS) 5 mg Tab Take 1 tablet (5 mg total) by mouth 2 (two) times daily. 30 tablet 0    aspirin (ASPIR-81 ORAL) Take 1 tablet by mouth once daily.      atorvastatin (LIPITOR) 20 MG tablet Take 20 mg by mouth once daily.   3    cholecalciferol, vitamin D3, 1,250 mcg (50,000 unit) capsule Take 1 each by mouth twice a week. Monday and Tuesdays      diltiaZEM (CARDIZEM CD) 120 MG Cp24 Take 1 capsule (120 mg total) by mouth once daily. 30 capsule 0    gabapentin (NEURONTIN) 300 MG capsule Take 600 mg by mouth 2 (two) times daily.      glimepiride (AMARYL) 4 MG tablet Take 4 mg by mouth before breakfast.      hydrochlorothiazide (HYDRODIURIL) 25 MG tablet Take 25 mg by mouth once daily.   3    insulin aspart U-100 (NOVOLOG FLEXPEN  "U-100 INSULIN) 100 unit/mL (3 mL) InPn pen Inject 25 Units into the skin 3 (three) times daily. Pt states sliding scale      irbesartan (AVAPRO) 300 MG tablet Take 300 mg by mouth once daily.   3    levalbuterol (XOPENEX) 0.63 mg/3 mL nebulizer solution Take 1 ampule by nebulization 3 (three) times daily as needed for Wheezing. Rescue      levothyroxine (SYNTHROID) 25 MCG tablet Take 25 mcg by mouth once daily.   3    liraglutide 0.6 mg/0.1 mL, 18 mg/3 mL, subq PNIJ (VICTOZA 3-KAM) 0.6 mg/0.1 mL (18 mg/3 mL) PnIj Inject 1.8 mg into the skin once daily.       magnesium oxide (MAG-OX) 400 mg (241.3 mg magnesium) tablet Take 400 mg by mouth once daily.      metformin (GLUCOPHAGE) 500 MG tablet Take 500 mg by mouth 2 (two) times daily with meals.   3    omeprazole (PRILOSEC) 20 MG capsule Take 20 mg by mouth once daily.   1    BD INSULIN SYRINGE ULTRA-FINE 0.5 mL 31 gauge x 5/16 Syrg USE TO INJECT 4X DAILY  1    blood sugar diagnostic (FREESTYLE LITE STRIPS MISC) FreeStyle Lite Strips   USE UTD QID      fluticasone-salmeterol diskus inhaler 250-50 mcg INHALE ONE PUFF INTO THE LUNGS TWICE DAILY 1 each 11    FREESTYLE INSULINX TEST STRIPS Strp TEST BLOOD SUGAR QID  3    FREESTYLE LANCETS 28 gauge lancets TEST QID  3    hyaluronate sod, cross-linked (GEL-ONE) 30 mg/3 mL Syrg 3 mLs.      hylan g-f 20 (SYNVISC-ONE) 48 mg/6 mL Syrg 6 mLs.      insulin (BASAGLAR KWIKPEN U-100 INSULIN) glargine 100 units/mL (3mL) SubQ pen Inject 35 Units into the skin once daily.       insulin syringe-needle U-100 (BD INSULIN SYRINGE ULTRA-FINE) 0.5 mL 31 gauge x 5/16" Syrg BD Insulin Syringe Ultra-Fine 0.5 mL 31 gauge x 5/16"      lancets (FREESTYLE LANCETS) 28 gauge Misc FreeStyle Lancets 28 gauge      pen needle, diabetic (BD ULTRA-FINE SUSHANT PEN NEEDLE) 32 gauge x 5/32" Ndle BD Ultra-Fine Sushant Pen Needle 32 gauge x 5/32"      sodium hyaluronate, orthovisc, (ORTHOVISC) 30 mg/2 mL Syrg ORTHOVISC 30 mg/2 mL intra-articular " "syringe      [DISCONTINUED] BASAGLAR KWIKPEN U-100 INSULIN glargine 100 units/mL (3mL) SubQ pen INJECT SUBCUTANEOUS 35 UNITS EVERY MORNING  7    [DISCONTINUED] benzonatate (TESSALON) 100 MG capsule benzonatate 100 mg capsule      [DISCONTINUED] cyclobenzaprine (FLEXERIL) 10 MG tablet cyclobenzaprine 10 mg tablet   TK 1 T PO TID PRF MUSCLE SPASMS      [DISCONTINUED] ergocalciferol (ERGOCALCIFEROL) 50,000 unit Cap ergocalciferol (vitamin D2) 50,000 unit capsule      [DISCONTINUED] gabapentin (NEURONTIN) 300 MG capsule TAKE ONE CAPSULE BY MOUTH IN THE MORNING, ONE CAPSULE AT NOON, AND 2 CAPSULES BEFORE BED  6    [DISCONTINUED] glimepiride (AMARYL) 4 MG tablet Take 4 mg by mouth 2 (two) times daily.   3    [DISCONTINUED] insulin aspart U-100 (NOVOLOG FLEXPEN U-100 INSULIN) 100 unit/mL InPn pen Novolog Flexpen U-100 Insulin aspart 100 unit/mL subcutaneous   INJECT 15 UNITS SUBCUTANEOUS THREE TIMES A DAY      [DISCONTINUED] levalbuterol (XOPENEX) 0.63 mg/3 mL nebulizer solution USE 1 VIAL VIA NEBULIZER THREE TIMES DAILY AS NEEDED FOR WHEEZING (Patient taking differently: 1 ampule. USE 1 VIAL VIA NEBULIZER THREE TIMES DAILY AS NEEDED FOR WHEEZING) 75 mL 11    [DISCONTINUED] meloxicam (MOBIC) 15 MG tablet meloxicam 15 mg tablet      [DISCONTINUED] nystatin-triamcinolone (MYCOLOG II) cream Apply to affected area 2 times daily 30 g 1    [DISCONTINUED] tizanidine (ZANAFLEX) 2 MG tablet TK 1 T PO Q 8 H PRN  0       Objective:  /65 (BP Location: Right arm, Patient Position: Sitting)   Pulse 80   Temp 98.5 °F (36.9 °C) (Oral)   Resp 18   Ht 5' 2" (1.575 m)   Wt 125.8 kg (277 lb 5.4 oz)   LMP  (LMP Unknown)   SpO2 (!) 89%   Breastfeeding No   BMI 50.73 kg/m²   General: obese, slightly dyspneic, nad, sitting at bedside  HE: ncat, perrl, eomi  ENT: op pink and moist without lesions or exudates  CV: +s1s2, no mrg, rrr  Resp: ctapb, no wrr  GI: bs active, abd soft, nt, nd  Skin: no lesions, no rash  Neuro: cn " 2-12 in tact, no focal deficits, no asterixis  Psych: regular rate speech, normal affect    Labs:  Recent Results (from the past 2688 hour(s))   Comprehensive metabolic panel    Collection Time: 06/18/20 12:28 PM   Result Value Ref Range    Sodium 139 136 - 145 mmol/L    Potassium 4.1 3.5 - 5.1 mmol/L    Chloride 100 95 - 110 mmol/L    CO2 27 23 - 29 mmol/L    Glucose 198 (H) 70 - 110 mg/dL    BUN, Bld 26 (H) 6 - 20 mg/dL    Creatinine 1.1 0.5 - 1.4 mg/dL    Calcium 9.5 8.7 - 10.5 mg/dL    Total Protein 7.6 6.0 - 8.4 g/dL    Albumin 3.9 3.5 - 5.2 g/dL    Total Bilirubin 0.3 0.1 - 1.0 mg/dL    Alkaline Phosphatase 87 55 - 135 U/L    AST 14 10 - 40 U/L    ALT 14 10 - 44 U/L    Anion Gap 12 8 - 16 mmol/L    eGFR if African American >60.0 >60 mL/min/1.73 m^2    eGFR if non African American 55.9 (A) >60 mL/min/1.73 m^2   CBC auto differential    Collection Time: 06/18/20 12:28 PM   Result Value Ref Range    WBC 10.32 3.90 - 12.70 K/uL    RBC 4.07 4.00 - 5.40 M/uL    Hemoglobin 8.7 (L) 12.0 - 16.0 g/dL    Hematocrit 31.5 (L) 37.0 - 48.5 %    Mean Corpuscular Volume 77 (L) 82 - 98 fL    Mean Corpuscular Hemoglobin 21.4 (L) 27.0 - 31.0 pg    Mean Corpuscular Hemoglobin Conc 27.6 (L) 32.0 - 36.0 g/dL    RDW 18.1 (H) 11.5 - 14.5 %    Platelets 316 150 - 350 K/uL    MPV 10.3 9.2 - 12.9 fL    Immature Granulocytes 0.5 0.0 - 0.5 %    Gran # (ANC) 8.0 (H) 1.8 - 7.7 K/uL    Immature Grans (Abs) 0.05 (H) 0.00 - 0.04 K/uL    Lymph # 1.5 1.0 - 4.8 K/uL    Mono # 0.5 0.3 - 1.0 K/uL    Eos # 0.3 0.0 - 0.5 K/uL    Baso # 0.04 0.00 - 0.20 K/uL    nRBC 0 0 /100 WBC    Gran% 77.5 (H) 38.0 - 73.0 %    Lymph% 14.2 (L) 18.0 - 48.0 %    Mono% 4.7 4.0 - 15.0 %    Eosinophil% 2.7 0.0 - 8.0 %    Basophil% 0.4 0.0 - 1.9 %    Differential Method Automated    TSH    Collection Time: 06/18/20 12:28 PM   Result Value Ref Range    TSH 2.900 0.340 - 5.600 uIU/mL   COVID-19 Routine Screening    Collection Time: 07/10/20  1:42 PM   Result Value Ref Range     SARS-CoV2 (COVID-19) Qualitative PCR Not Detected Not Detected   CBC auto differential    Collection Time: 07/28/20  9:55 AM   Result Value Ref Range    WBC 9.09 3.90 - 12.70 K/uL    RBC 3.67 (L) 4.00 - 5.40 M/uL    Hemoglobin 7.4 (L) 12.0 - 16.0 g/dL    Hematocrit 27.9 (L) 37.0 - 48.5 %    Mean Corpuscular Volume 76 (L) 82 - 98 fL    Mean Corpuscular Hemoglobin 20.2 (L) 27.0 - 31.0 pg    Mean Corpuscular Hemoglobin Conc 26.5 (L) 32.0 - 36.0 g/dL    RDW 18.4 (H) 11.5 - 14.5 %    Platelets 278 150 - 350 K/uL    MPV 10.0 9.2 - 12.9 fL    Immature Granulocytes 0.4 0.0 - 0.5 %    Gran # (ANC) 7.1 1.8 - 7.7 K/uL    Immature Grans (Abs) 0.04 0.00 - 0.04 K/uL    Lymph # 1.3 1.0 - 4.8 K/uL    Mono # 0.5 0.3 - 1.0 K/uL    Eos # 0.2 0.0 - 0.5 K/uL    Baso # 0.02 0.00 - 0.20 K/uL    nRBC 0 0 /100 WBC    Gran% 77.7 (H) 38.0 - 73.0 %    Lymph% 14.0 (L) 18.0 - 48.0 %    Mono% 5.8 4.0 - 15.0 %    Eosinophil% 1.9 0.0 - 8.0 %    Basophil% 0.2 0.0 - 1.9 %    Differential Method Automated    Comprehensive metabolic panel    Collection Time: 07/28/20  9:55 AM   Result Value Ref Range    Sodium 142 136 - 145 mmol/L    Potassium 4.0 3.5 - 5.1 mmol/L    Chloride 101 95 - 110 mmol/L    CO2 31 (H) 23 - 29 mmol/L    Glucose 132 (H) 70 - 110 mg/dL    BUN, Bld 25 (H) 6 - 20 mg/dL    Creatinine 1.1 0.5 - 1.4 mg/dL    Calcium 9.5 8.7 - 10.5 mg/dL    Total Protein 7.7 6.0 - 8.4 g/dL    Albumin 4.0 3.5 - 5.2 g/dL    Total Bilirubin 0.4 0.1 - 1.0 mg/dL    Alkaline Phosphatase 80 55 - 135 U/L    AST 12 10 - 40 U/L    ALT 12 10 - 44 U/L    Anion Gap 10 8 - 16 mmol/L    eGFR if African American >60.0 >60 mL/min/1.73 m^2    eGFR if non African American 55.9 (A) >60 mL/min/1.73 m^2   Troponin I    Collection Time: 07/28/20  9:55 AM   Result Value Ref Range    Troponin I <0.030 <=0.040 ng/mL   Brain natriuretic peptide    Collection Time: 07/28/20  9:55 AM   Result Value Ref Range    BNP 50 0 - 99 pg/mL   Protime-INR    Collection Time: 07/28/20  9:55  AM   Result Value Ref Range    PT 14.9 (H) 10.6 - 14.8 sec    INR 1.2    COVID-19 Rapid Screening    Collection Time: 07/28/20 10:45 AM   Result Value Ref Range    SARS-CoV-2 RNA, Amplification, Qual Negative Negative   Occult blood x 1, stool    Collection Time: 07/28/20 10:45 AM   Result Value Ref Range    Occult Blood Positive (A) Negative   Prepare RBC 2 Units; anemia    Collection Time: 07/28/20 11:22 AM   Result Value Ref Range    UNIT NUMBER [id number]     Product Code N9910C57     DISPENSE STATUS ISSUED     CODING SYSTEM CSBA548     Unit Blood Type Code 6200     Unit Blood Type A POS     Unit Expiration [id number]     UNIT NUMBER [id number]     Product Code [id number]     DISPENSE STATUS CROSSMATCHED     CODING SYSTEM KGIC387     Unit Blood Type Code 6200     Unit Blood Type A POS     Unit Expiration [id number]    POCT glucose    Collection Time: 07/28/20 11:25 AM   Result Value Ref Range    POC Glucose 159 (H) 70 - 110   Type & Screen    Collection Time: 07/28/20 12:05 PM   Result Value Ref Range    Group & Rh A POS     Indirect Cristina NEG    Echo Color Flow Doppler? Yes    Collection Time: 07/28/20  2:59 PM   Result Value Ref Range    BSA 2.34 m2    Right Atrial Pressure (from IVC) 3 mmHg    TDI SEPTAL 0.11 m/s    LV LATERAL E/E' RATIO 12.70 m/s    LV SEPTAL E/E' RATIO 11.55 m/s    AORTIC VALVE CUSP SEPERATION 2.10 cm    TDI LATERAL 0.10 m/s    PV PEAK VELOCITY 128.33 cm/s    LVIDD 5.27 3.5 - 6.0 cm    IVS 0.97 0.6 - 1.1 cm    PW 0.97 0.6 - 1.1 cm    Ao root annulus 3.69 cm    LVIDS 4.43 (A) 2.1 - 4.0 cm    FS 16 28 - 44 %    LV mass 190.66 g    LA size 3.40 cm    RVDD 255.00 cm    Left Ventricle Relative Wall Thickness 0.37 cm    AV mean gradient 13 mmHg    AV valve area 1.81 cm2    AV Velocity Ratio 39.41     AV index (prosthetic) 0.51     E/A ratio 1.40     Mean e' 0.11 m/s    E wave decelartion time 215.48 msec    IVRT 53.87 msec    LVOT diameter 2.13 cm    LVOT area 3.6 cm2    LVOT peak alfredo  91.42 m/s    LVOT peak VTI 19.44 cm    Ao peak mariano 2.32 m/s    Ao VTI 38.26 cm    LVOT stroke volume 69.23 cm3    AV peak gradient 22 mmHg    E/E' ratio 12.10 m/s    MV Peak E Mariano 1.27 m/s    MV Peak A Mariano 0.91 m/s    LV Mass Index 87 g/m2   POCT glucose    Collection Time: 07/28/20  5:22 PM   Result Value Ref Range    POC Glucose 170 (H) 70 - 110   POCT glucose    Collection Time: 07/28/20  8:52 PM   Result Value Ref Range    POC Glucose 222 (H) 70 - 110            Assessment:  Chronic iron deficiency anemia    Plan:  EGD/Colonoscopy Friday MAC

## 2020-07-29 NOTE — RESPIRATORY THERAPY
07/29/20 0055   Patient Assessment/Suction   Level of Consciousness (AVPU) alert   PRE-TX-O2   O2 Device (Oxygen Therapy) nasal cannula   Flow (L/min) 3   Pulse Oximetry Type Intermittent   Pulse 77   Resp (!) 22   Respiratory Interventions   Cough And Deep Breathing done with encouragement   Breathing Techniques/Airway Clearance deep/controlled cough encouraged;diaphragmatic breathing promoted   Preset CPAP/BiPAP Settings   Mode Of Delivery CPAP   $ Initial CPAP/BiPAP Setup? Yes   $ Is patient using? Yes   Size of Mask   (Pt own nasal mask)   Education   $ Education Other (see comment);15 min  (CPAP set up,deep diaphragmatic breathing and cough exercises)   Respiratory Evaluation   $ Care Plan Tech Time 15 min   Evaluation For   (care plan)   Pt needed a CPAP set up. She was unable to have hers from home. Set up CPAP at 16 cmh2o per home setting. Pt using her own nasal cpap mask.

## 2020-07-29 NOTE — CARE UPDATE
07/28/20 2236   Patient Assessment/Suction   Level of Consciousness (AVPU) alert   Respiratory Effort Unlabored   Expansion/Accessory Muscles/Retractions no use of accessory muscles   All Lung Fields Breath Sounds clear;diminished   Rhythm/Pattern, Respiratory no shortness of breath reported   Cough Frequency no cough   PRE-TX-O2   O2 Device (Oxygen Therapy) nasal cannula   $ Is the patient on Low Flow Oxygen? Yes   Flow (L/min) 2   SpO2 (!) 93 %   Pulse Oximetry Type Intermittent   $ Pulse Oximetry - Multiple Charge Pulse Oximetry - Multiple   Pulse 80   Resp 15   Positioning   Head of Bed (HOB) HOB elevated   Aerosol Therapy   $ Aerosol Therapy Charges PRN treatment not required   Respiratory Evaluation   $ Care Plan Tech Time 15 min

## 2020-07-30 ENCOUNTER — ANESTHESIA EVENT (OUTPATIENT)
Dept: SURGERY | Facility: HOSPITAL | Age: 60
DRG: 811 | End: 2020-07-30
Payer: COMMERCIAL

## 2020-07-30 LAB
ALBUMIN SERPL BCP-MCNC: 3.8 G/DL (ref 3.5–5.2)
ALP SERPL-CCNC: 79 U/L (ref 55–135)
ALT SERPL W/O P-5'-P-CCNC: 13 U/L (ref 10–44)
ANION GAP SERPL CALC-SCNC: 11 MMOL/L (ref 8–16)
AST SERPL-CCNC: 11 U/L (ref 10–40)
BASOPHILS # BLD AUTO: 0.03 K/UL (ref 0–0.2)
BASOPHILS NFR BLD: 0.4 % (ref 0–1.9)
BILIRUB SERPL-MCNC: 0.7 MG/DL (ref 0.1–1)
BUN SERPL-MCNC: 20 MG/DL (ref 6–20)
CALCIUM SERPL-MCNC: 9.3 MG/DL (ref 8.7–10.5)
CHLORIDE SERPL-SCNC: 94 MMOL/L (ref 95–110)
CO2 SERPL-SCNC: 34 MMOL/L (ref 23–29)
CREAT SERPL-MCNC: 0.7 MG/DL (ref 0.5–1.4)
DIFFERENTIAL METHOD: ABNORMAL
EOSINOPHIL # BLD AUTO: 0.2 K/UL (ref 0–0.5)
EOSINOPHIL NFR BLD: 2.8 % (ref 0–8)
ERYTHROCYTE [DISTWIDTH] IN BLOOD BY AUTOMATED COUNT: 19.2 % (ref 11.5–14.5)
EST. GFR  (AFRICAN AMERICAN): >60 ML/MIN/1.73 M^2
EST. GFR  (NON AFRICAN AMERICAN): >60 ML/MIN/1.73 M^2
GLUCOSE SERPL-MCNC: 111 MG/DL (ref 70–110)
GLUCOSE SERPL-MCNC: 123 MG/DL (ref 70–110)
GLUCOSE SERPL-MCNC: 203 MG/DL (ref 70–110)
GLUCOSE SERPL-MCNC: 237 MG/DL (ref 70–110)
GLUCOSE SERPL-MCNC: 277 MG/DL (ref 70–110)
GLUCOSE SERPL-MCNC: 279 MG/DL (ref 70–110)
HCT VFR BLD AUTO: 31.3 % (ref 37–48.5)
HGB BLD-MCNC: 9 G/DL (ref 12–16)
IMM GRANULOCYTES # BLD AUTO: 0.04 K/UL (ref 0–0.04)
IMM GRANULOCYTES NFR BLD AUTO: 0.5 % (ref 0–0.5)
LYMPHOCYTES # BLD AUTO: 1.2 K/UL (ref 1–4.8)
LYMPHOCYTES NFR BLD: 15.5 % (ref 18–48)
MAGNESIUM SERPL-MCNC: 1.9 MG/DL (ref 1.6–2.6)
MCH RBC QN AUTO: 21.7 PG (ref 27–31)
MCHC RBC AUTO-ENTMCNC: 28.8 G/DL (ref 32–36)
MCV RBC AUTO: 75 FL (ref 82–98)
MONOCYTES # BLD AUTO: 0.5 K/UL (ref 0.3–1)
MONOCYTES NFR BLD: 6.2 % (ref 4–15)
NEUTROPHILS # BLD AUTO: 5.7 K/UL (ref 1.8–7.7)
NEUTROPHILS NFR BLD: 74.6 % (ref 38–73)
NRBC BLD-RTO: 1 /100 WBC
PLATELET # BLD AUTO: 244 K/UL (ref 150–350)
PMV BLD AUTO: 9.4 FL (ref 9.2–12.9)
POTASSIUM SERPL-SCNC: 3.8 MMOL/L (ref 3.5–5.1)
PROT SERPL-MCNC: 7.3 G/DL (ref 6–8.4)
RBC # BLD AUTO: 4.15 M/UL (ref 4–5.4)
SODIUM SERPL-SCNC: 139 MMOL/L (ref 136–145)
WBC # BLD AUTO: 7.6 K/UL (ref 3.9–12.7)

## 2020-07-30 PROCEDURE — 27000221 HC OXYGEN, UP TO 24 HOURS

## 2020-07-30 PROCEDURE — 83735 ASSAY OF MAGNESIUM: CPT

## 2020-07-30 PROCEDURE — 94660 CPAP INITIATION&MGMT: CPT

## 2020-07-30 PROCEDURE — 94640 AIRWAY INHALATION TREATMENT: CPT

## 2020-07-30 PROCEDURE — 80053 COMPREHEN METABOLIC PANEL: CPT

## 2020-07-30 PROCEDURE — 99232 SBSQ HOSP IP/OBS MODERATE 35: CPT | Mod: ,,, | Performed by: INTERNAL MEDICINE

## 2020-07-30 PROCEDURE — 99232 PR SUBSEQUENT HOSPITAL CARE,LEVL II: ICD-10-PCS | Mod: ,,, | Performed by: INTERNAL MEDICINE

## 2020-07-30 PROCEDURE — 25000003 PHARM REV CODE 250: Performed by: INTERNAL MEDICINE

## 2020-07-30 PROCEDURE — 94761 N-INVAS EAR/PLS OXIMETRY MLT: CPT

## 2020-07-30 PROCEDURE — 25000242 PHARM REV CODE 250 ALT 637 W/ HCPCS: Performed by: INTERNAL MEDICINE

## 2020-07-30 PROCEDURE — 85025 COMPLETE CBC W/AUTO DIFF WBC: CPT

## 2020-07-30 PROCEDURE — 99900035 HC TECH TIME PER 15 MIN (STAT)

## 2020-07-30 PROCEDURE — 63700000 PHARM REV CODE 250 ALT 637 W/O HCPCS: Performed by: INTERNAL MEDICINE

## 2020-07-30 PROCEDURE — 36415 COLL VENOUS BLD VENIPUNCTURE: CPT

## 2020-07-30 PROCEDURE — 21400001 HC TELEMETRY ROOM

## 2020-07-30 RX ORDER — DILTIAZEM HYDROCHLORIDE 180 MG/1
180 CAPSULE, COATED, EXTENDED RELEASE ORAL DAILY
Status: DISCONTINUED | OUTPATIENT
Start: 2020-07-30 | End: 2020-08-01 | Stop reason: HOSPADM

## 2020-07-30 RX ORDER — FLUTICASONE PROPIONATE 50 MCG
2 SPRAY, SUSPENSION (ML) NASAL DAILY
Status: DISCONTINUED | OUTPATIENT
Start: 2020-07-30 | End: 2020-08-01 | Stop reason: HOSPADM

## 2020-07-30 RX ORDER — POLYETHYLENE GLYCOL 3350, SODIUM CHLORIDE, SODIUM BICARBONATE, POTASSIUM CHLORIDE 420; 11.2; 5.72; 1.48 G/4L; G/4L; G/4L; G/4L
4000 POWDER, FOR SOLUTION ORAL ONCE
Status: COMPLETED | OUTPATIENT
Start: 2020-07-30 | End: 2020-07-30

## 2020-07-30 RX ADMIN — LEVOTHYROXINE SODIUM 25 MCG: 25 TABLET ORAL at 06:07

## 2020-07-30 RX ADMIN — FLUTICASONE FUROATE AND VILANTEROL TRIFENATATE 1 PUFF: 100; 25 POWDER RESPIRATORY (INHALATION) at 07:07

## 2020-07-30 RX ADMIN — INSULIN ASPART 10 UNITS: 100 INJECTION, SOLUTION INTRAVENOUS; SUBCUTANEOUS at 06:07

## 2020-07-30 RX ADMIN — ATORVASTATIN CALCIUM 20 MG: 20 TABLET, FILM COATED ORAL at 08:07

## 2020-07-30 RX ADMIN — POTASSIUM CHLORIDE 40 MEQ: 20 SOLUTION ORAL at 06:07

## 2020-07-30 RX ADMIN — POLYETHYLENE GLYCOL 3350, SODIUM CHLORIDE, SODIUM BICARBONATE AND POTASSIUM CHLORIDE 4000 ML: KIT ORAL at 05:07

## 2020-07-30 RX ADMIN — HYDROCHLOROTHIAZIDE 25 MG: 25 TABLET ORAL at 10:07

## 2020-07-30 RX ADMIN — DILTIAZEM HYDROCHLORIDE 180 MG: 180 CAPSULE, COATED, EXTENDED RELEASE ORAL at 10:07

## 2020-07-30 RX ADMIN — INSULIN ASPART 3 UNITS: 100 INJECTION, SOLUTION INTRAVENOUS; SUBCUTANEOUS at 08:07

## 2020-07-30 RX ADMIN — FLUTICASONE PROPIONATE 100 MCG: 50 SPRAY, METERED NASAL at 10:07

## 2020-07-30 RX ADMIN — MAGNESIUM OXIDE 400 MG: 400 TABLET ORAL at 10:07

## 2020-07-30 RX ADMIN — PANTOPRAZOLE SODIUM 40 MG: 40 TABLET, DELAYED RELEASE ORAL at 06:07

## 2020-07-30 NOTE — ASSESSMENT & PLAN NOTE
Acute on chronic resp failure due to acute anemia  Had 2 U pRBC s  Will Transfuse 1 more Unit of pRBC  Continue lasix

## 2020-07-30 NOTE — SUBJECTIVE & OBJECTIVE
Interval History:     Review of Systems   Constitutional: Negative for activity change and appetite change.   HENT: Negative for congestion and dental problem.    Eyes: Negative for discharge and itching.   Respiratory: Negative for shortness of breath.    Cardiovascular: Negative for chest pain.   Gastrointestinal: Negative for abdominal distention and abdominal pain.   Endocrine: Negative for cold intolerance.   Genitourinary: Negative for difficulty urinating and dysuria.   Musculoskeletal: Negative for arthralgias and back pain.   Skin: Negative for color change.   Neurological: Negative for dizziness and facial asymmetry.   Hematological: Negative for adenopathy.   Psychiatric/Behavioral: Negative for agitation and behavioral problems.     Objective:     Vital Signs (Most Recent):  Temp: 98.5 °F (36.9 °C) (07/29/20 1901)  Pulse: 82 (07/29/20 1901)  Resp: 20 (07/29/20 1901)  BP: (!) 141/68 (07/29/20 1901)  SpO2: 97 % (07/29/20 1901) Vital Signs (24h Range):  Temp:  [97.6 °F (36.4 °C)-98.7 °F (37.1 °C)] 98.5 °F (36.9 °C)  Pulse:  [64-85] 82  Resp:  [15-22] 20  SpO2:  [91 %-97 %] 97 %  BP: (121-160)/(58-85) 141/68     Weight: 128.5 kg (283 lb 4.7 oz)  Body mass index is 51.81 kg/m².    Intake/Output Summary (Last 24 hours) at 7/29/2020 2159  Last data filed at 7/29/2020 1720  Gross per 24 hour   Intake 1110 ml   Output 1350 ml   Net -240 ml      Physical Exam  Vitals signs and nursing note reviewed.   Constitutional:       General: She is not in acute distress.  HENT:      Head: Normocephalic.      Right Ear: External ear normal.      Left Ear: External ear normal.      Nose: Nose normal.      Mouth/Throat:      Mouth: Mucous membranes are moist.   Eyes:      Conjunctiva/sclera: Conjunctivae normal.   Neck:      Musculoskeletal: Neck supple.   Cardiovascular:      Rate and Rhythm: Normal rate.   Pulmonary:      Effort: Pulmonary effort is normal. No respiratory distress.   Abdominal:      General: There is no  distension.   Musculoskeletal: Normal range of motion.   Skin:     General: Skin is warm.   Neurological:      Mental Status: She is alert and oriented to person, place, and time.   Psychiatric:         Behavior: Behavior normal.         Significant Labs:   CBC:   Recent Labs   Lab 07/28/20  0955 07/29/20  0456   WBC 9.09 7.55   HGB 7.4* 8.1*   HCT 27.9* 29.1*    234     CMP:   Recent Labs   Lab 07/28/20 0955 07/29/20 0456    139   K 4.0 3.8    96   CO2 31* 32*   * 158*   BUN 25* 25*   CREATININE 1.1 0.8   CALCIUM 9.5 8.8   PROT 7.7 7.1   ALBUMIN 4.0 3.6   BILITOT 0.4 0.9   ALKPHOS 80 75   AST 12 10   ALT 12 11   ANIONGAP 10 11   EGFRNONAA 55.9* >60.0       Significant Imaging: I have reviewed all pertinent imaging results/findings within the past 24 hours.

## 2020-07-30 NOTE — PROGRESS NOTES
"Pulmonary/Critical Care Progress Note      Patient name: Liset Bailon  MRN: [id number]  Date: 07/30/2020    Admit Date: 7/28/2020  Consult Requested By: Solomon Sethi MD    Reason for Consult: Dyspnea, chronic hypoxemic respiratory failure, JASKARAN, OHV, anemia    HPI:    7/28/2020 - 58 yo female well known to me with JASKARAN (recently got new CPAP with new settings and has been wearing), OHV, "mild asthma".  For the last few weeks she has felt more dyspneic and was to see me tomorrow but symptoms worsened and she came to ER and was found to be anemic and is admitted for further evaluation.  No fever, chills, sweats, cough, sputum, wheezing.  Has had some mild peripheral edema (better today).  No other new symptoms, no GI bleeding reported.  She states that she has been iron deficient in the past but has been off of iron for " a while".    7/29/2020 - Stable overnight, got 2 U PRBC but hgb only increased 7.4 - 8.1.  She has not been very active yet so difficult to assess her dyspnea.  She denies any melena, BRBPR, hematochezia.  She states that she was seen by GI and they are considering upper and lower endoscopy.  No new complaints today.    7/30/2020 - Stable overnight still has dyspnea (this is a chronic complaint for her), no worsening of this symptom.  She ahs some issues with AM headaches which she relates to her CPAP.  She feels that she has some nasal congestion and may be coming down with a "cold".  H/H better after her 3rd unit PRBC    Review of Systems    Review of Systems   Constitutional: Positive for malaise/fatigue. Negative for chills, diaphoresis, fever and weight loss.   HENT: Positive for hearing loss. Negative for congestion, ear discharge, ear pain, nosebleeds, sinus pain, sore throat and tinnitus.    Eyes: Negative for pain.   Respiratory: Positive for cough and shortness of breath. Negative for hemoptysis, sputum production, wheezing and stridor.    Cardiovascular: Positive for leg swelling. Negative for " chest pain, palpitations, orthopnea, claudication and PND.   Gastrointestinal: Negative for abdominal pain, blood in stool, constipation, diarrhea, heartburn, nausea and vomiting.   Genitourinary: Negative for dysuria, frequency, hematuria and urgency.   Musculoskeletal: Negative for falls and myalgias.   Skin: Negative for itching and rash.   Neurological: Negative for dizziness, tingling, tremors, sensory change, speech change, focal weakness, seizures, loss of consciousness, weakness and headaches.   Psychiatric/Behavioral: Negative for depression, substance abuse and suicidal ideas. The patient is not nervous/anxious.        Past Medical History    Past Medical History:   Diagnosis Date    Acute on chronic respiratory failure 2019    Arthritis     Asthma     Chronic bronchitis     Diabetes mellitus     Hyperlipidemia     Hypertension     Pain in joint of left shoulder region 2018    Sleep apnea     Thyroid disease        Past Surgical History    Past Surgical History:   Procedure Laterality Date    APPENDECTOMY       SECTION      x3    HYSTERECTOMY      OOPHORECTOMY Bilateral     age 42       Medications (scheduled):      atorvastatin  20 mg Oral QHS    diltiaZEM  120 mg Oral Daily    fluticasone furoate-vilanteroL  1 puff Inhalation Daily    hydroCHLOROthiazide  25 mg Oral Daily    insulin aspart U-100  10 Units Subcutaneous TIDWM    insulin detemir U-100  30 Units Subcutaneous QHS    levothyroxine  25 mcg Oral Daily    magnesium oxide  400 mg Oral Daily    pantoprazole  40 mg Oral Daily    peg-electrolyte soln  4,000 mL Oral Once    senna-docusate 8.6-50 mg  1 tablet Oral BID       Medications (infusions):         Medications (prn):     sodium chloride, sodium chloride, acetaminophen, albuterol, dextrose 50%, dextrose 50%, glucagon (human recombinant), glucose, glucose, insulin aspart U-100, levalbuterol, magnesium oxide, magnesium oxide, ondansetron, potassium  "chloride 10%, potassium chloride 10%    Family History:   Family History   Problem Relation Age of Onset    Pancreatic cancer Mother     Breast cancer Neg Hx     Colon cancer Neg Hx     Ovarian cancer Neg Hx        Social History: Tobacco:   Social History     Tobacco Use   Smoking Status Never Smoker   Smokeless Tobacco Never Used                                EtOH:   Social History     Substance and Sexual Activity   Alcohol Use Yes    Comment: occasional                                Drugs:   Social History     Substance and Sexual Activity   Drug Use No                                Occupation: not working                             Asbestos exposure: no    Physical Exam    Vital signs:  Temp:  [97.9 °F (36.6 °C)-99 °F (37.2 °C)]   Pulse:  [64-85]   Resp:  [14-20]   BP: (123-166)/(60-85)   SpO2:  [92 %-97 %]     Intake/Output:     Intake/Output Summary (Last 24 hours) at 7/30/2020 0835  Last data filed at 7/29/2020 1720  Gross per 24 hour   Intake 795 ml   Output 950 ml   Net -155 ml        BMI: Estimated body mass index is 51.29 kg/m² as calculated from the following:    Height as of an earlier encounter on 7/28/20: 5' 2" (1.575 m).    Weight as of this encounter: 127.2 kg (280 lb 6.8 oz).    Physical Exam   Constitutional: She is oriented to person, place, and time. No distress.   Overweight female, nad, aao x 3   HENT:   Head: Normocephalic and atraumatic.   Right Ear: External ear normal.   Left Ear: External ear normal.   Eyes: Pupils are equal, round, and reactive to light. Conjunctivae and EOM are normal. Right eye exhibits no discharge. Left eye exhibits no discharge. No scleral icterus.   Neck: Normal range of motion. Neck supple. No JVD present. No tracheal deviation present. No thyromegaly present.   Cardiovascular: Normal rate, regular rhythm and intact distal pulses. Exam reveals no gallop and no friction rub.   Murmur heard.  Pulmonary/Chest: Effort normal and breath sounds normal. No " stridor. No respiratory distress. She has no wheezes. She has no rales.   Decreased at bases   Abdominal: Soft. Bowel sounds are normal. She exhibits no distension. There is no abdominal tenderness. There is no rebound and no guarding.   obese   Musculoskeletal: Normal range of motion.         General: No tenderness or edema.      Comments: Wearing compression hose   Lymphadenopathy:     She has no cervical adenopathy.   Neurological: She is alert and oriented to person, place, and time.   Skin: Skin is warm and dry. She is not diaphoretic.   Psychiatric: Mood, memory, affect and judgment normal.   Nursing note and vitals reviewed.      Laboratory    Recent Labs   Lab 07/30/20  0458   WBC 7.60   RBC 4.15   HGB 9.0*   HCT 31.3*      MCV 75*   MCH 21.7*   MCHC 28.8*       Recent Labs   Lab 07/30/20  0458   CALCIUM 9.3   PROT 7.3      K 3.8   CO2 34*   CL 94*   BUN 20   CREATININE 0.7   ALKPHOS 79   ALT 13   AST 11   BILITOT 0.7       No results for input(s): PT, INR, APTT in the last 24 hours.    No results for input(s): CPK, CPKMB, TROPONINI, MB in the last 24 hours.    Additional labs:     reviewed    Microbiology:       Microbiology Results (last 7 days)     ** No results found for the last 168 hours. **          Radiology        Additional Studies    EKG (7/27)  Vent. Rate : 087 BPM     Atrial Rate : 087 BPM      P-R Int : 164 ms          QRS Dur : 096 ms       QT Int : 364 ms       P-R-T Axes : 023 -44 079 degrees      QTc Int : 438 ms     Normal sinus rhythm   Left axis deviation   LVH with repolarization abnormality   Abnormal ECG   When compared with ECG of 27-NOV-2019 12:33,   Sinus rhythm has replaced Atrial fibrillation   Questionable change in QRS duration   (no S1Q3T3)    ECHO (7/28)  · Normal left ventricular systolic function. The estimated ejection fraction is 55%.  · Normal LV diastolic function.  · No wall motion abnormalities.  · Normal right ventricular systolic function.  · Mild  tricuspid regurgitation.  · No pulmonary hypertension present.  · Normal central venous pressure (3 mmHg).    Ventilator Information              No results for input(s): PH, PCO2, PO2, HCO3, POCSATURATED, BE in the last 72 hours.      Impression    Active Hospital Problems    Diagnosis  POA    *Acute anemia [D64.9]  Unknown    Paroxysmal atrial fibrillation [I48.0]  Yes     Priority: 5     Mild intermittent asthma without complication [J45.20]  Yes    Obesity hypoventilation syndrome [E66.2]  Yes    Respiratory failure with hypoxia and hypercapnia [J96.91, J96.92]  Yes    Type 2 diabetes mellitus, with long-term current use of insulin [E11.9, Z79.4]  Not Applicable    Morbid obesity [E66.01]  Yes      Resolved Hospital Problems   No resolved problems to display.       Plan    · Dyspnea probably back at her baseline and is multifactorial  · Await GI workup - for endoscopies tomorrow  · Follow H/H  · Better BP control  · Asthma is mild by history and stable with no exacerbation  · Continue with CPAP  · Needs weight loss/exercise  · ECHO noted  · Increase activity as able    Thank you for this consult.  I will follow with you while the patient is hospitalized.  Please call [phone]) if you have any questions.    Percy Guzmán MD

## 2020-07-30 NOTE — ANESTHESIA PREPROCEDURE EVALUATION
2020  Liset Bailon is a 59 y.o., female.      Patient Active Problem List   Diagnosis    Obstructive sleep apnea syndrome    Never smoker    Morbid obesity    Type 2 diabetes mellitus, with long-term current use of insulin    Dyspnea    Respiratory failure with hypoxia and hypercapnia    Paroxysmal atrial fibrillation    Obesity hypoventilation syndrome    Acute anemia    Acute on chronic anemia    Mild intermittent asthma without complication       Past Surgical History:   Procedure Laterality Date    APPENDECTOMY       SECTION      x3    HYSTERECTOMY      OOPHORECTOMY Bilateral     age 42        Tobacco Use:  The patient  reports that she has never smoked. She has never used smokeless tobacco.     Results for orders placed or performed during the hospital encounter of 20   EKG 12-lead    Collection Time: 20  9:39 AM    Narrative    Test Reason : R06.02,    Vent. Rate : 087 BPM     Atrial Rate : 087 BPM     P-R Int : 164 ms          QRS Dur : 096 ms      QT Int : 364 ms       P-R-T Axes : 023 -44 079 degrees     QTc Int : 438 ms    Normal sinus rhythm  Left axis deviation  LVH with repolarization abnormality  Abnormal ECG  When compared with ECG of 2019 12:33,  Sinus rhythm has replaced Atrial fibrillation  Questionable change in QRS duration  Confirmed by Burke SOLIMAN, Cuco MADDEN ([id number]) on 2020 9:14:43 PM    Referred By:  FRACISCO           Confirmed By:Cuco Turk MD        Imaging Results          X-Ray Chest AP Portable (Final result)  Result time 20 10:45:41    Final result by Cruzito Cesar MD (20 10:45:41)                 Narrative:    HISTORY: Dyspnea, acute on chronic respiratory failure.    FINDINGS: Portable chest radiograph at 1042 hours compared to  2020 shows the cardiac silhouette is enlarged, stable in size,  with the  pulmonary vasculature stable and within normal limits.    The lungs are normally expanded, with no consolidation, large pleural  effusion, or evidence of pulmonary edema. No confluent infiltrates or  pneumothorax. No acute osseous abnormalities.    IMPRESSION: Stable cardiomegaly, with no evidence of acute  cardiopulmonary disease.    Electronically Signed by Cruzito CORTÉS on 7/28/2020 10:47 AM                               Lab Results   Component Value Date    WBC 7.60 07/30/2020    HGB 9.0 (L) 07/30/2020    HCT 31.3 (L) 07/30/2020    MCV 75 (L) 07/30/2020     07/30/2020     BMP  Lab Results   Component Value Date     07/30/2020    K 3.8 07/30/2020    CL 94 (L) 07/30/2020    CO2 34 (H) 07/30/2020    BUN 20 07/30/2020    CREATININE 0.7 07/30/2020    CALCIUM 9.3 07/30/2020    ANIONGAP 11 07/30/2020     (H) 07/30/2020     (H) 07/29/2020     (H) 07/28/2020       Results for orders placed during the hospital encounter of 07/28/20   Echo Color Flow Doppler? Yes    Narrative · Normal left ventricular systolic function. The estimated ejection   fraction is 55%.  · Normal LV diastolic function.  · No wall motion abnormalities.  · Normal right ventricular systolic function.  · Mild tricuspid regurgitation.  · No pulmonary hypertension present.  · Normal central venous pressure (3 mmHg).     Morphologic left ventricle appears to contract normally in is not dilated  Aortic valve is not well recorded a suggestion of very mild aortic valve   stenosis at most is noted by Doppler  Of poor acoustic window.  Definity contrast was not utilized to visualize   left ventricle               Anesthesia Evaluation    I have reviewed the Patient Summary Reports.    I have reviewed the Nursing Notes. I have reviewed the NPO Status.   I have reviewed the Medications.     Review of Systems  Anesthesia Hx:  No problems with previous Anesthesia Denies Hx of Anesthetic complications  Denies Family Hx of  Anesthesia complications.   Denies Personal Hx of Anesthesia complications.   Social:  Non-Smoker    Hematology/Oncology:     Oncology Normal    -- Anemia:   EENT/Dental:EENT/Dental Normal   Cardiovascular:   Exercise tolerance: poor Hypertension Dysrhythmias (paroxysmal) atrial fibrillation ECG has been reviewed.    Pulmonary:   Asthma asymptomatic and mild Shortness of breath (2L NC at night ) Sleep Apnea, CPAP    Education provided regarding risk of obstructive sleep apnea     Renal/:  Renal/ Normal     Hepatic/GI:  Hepatic/GI Normal    Musculoskeletal:  Musculoskeletal Normal    Neurological:  Neurology Normal    Endocrine:   Diabetes    Psych:  Psychiatric Normal           Physical Exam  General:  Well nourished, Morbid Obesity    Airway/Jaw/Neck:  Airway Findings: Mouth Opening: Normal Tongue: Normal  General Airway Assessment: Adult  Mallampati: II  TM Distance: Normal, at least 6 cm  Jaw/Neck Findings:  Neck ROM: Normal ROM      Dental:  Dental Findings: Upper partial dentures   Chest/Lungs:  Chest/Lungs Findings: Clear to auscultation, Normal Respiratory Rate     Heart/Vascular:  Heart Findings: Rate: Normal  Rhythm: Regular Rhythm  Sounds: Normal        Mental Status:  Mental Status Findings:  Alert and Oriented         Anesthesia Plan  Type of Anesthesia, risks & benefits discussed:  Anesthesia Type:  MAC  Patient's Preference:   Intra-op Monitoring Plan: standard ASA monitors  Intra-op Monitoring Plan Comments:   Post Op Pain Control Plan:   Post Op Pain Control Plan Comments:   Induction:    Beta Blocker:         Informed Consent: Patient understands risks and agrees with Anesthesia plan.  Questions answered. Anesthesia consent signed with patient.  ASA Score: 3     Day of Surgery Review of History & Physical:        Anesthesia Plan Notes: POM mask        Ready For Surgery From Anesthesia Perspective.

## 2020-07-30 NOTE — PLAN OF CARE
07/30/20 0744   Patient Assessment/Suction   Level of Consciousness (AVPU) alert   Respiratory Effort Normal;Unlabored   Expansion/Accessory Muscles/Retractions expansion symmetric;no retractions;no use of accessory muscles   All Lung Fields Breath Sounds clear;diminished   PRE-TX-O2   O2 Device (Oxygen Therapy) nasal cannula   $ Is the patient on Low Flow Oxygen? Yes   Flow (L/min) 3   SpO2 96 %   Pulse Oximetry Type Intermittent   $ Pulse Oximetry - Multiple Charge Pulse Oximetry - Multiple   Pulse 77   Resp 20   Aerosol Therapy   $ Aerosol Therapy Charges PRN treatment not required   Inhaler   $ Inhaler Charges MDI (Metered Dose Inahler) Treatment;Mouth rinsed post treatment   Daily Review of Necessity (Inhaler) completed   Respiratory Treatment Status (Inhaler) given   Treatment Route (Inhaler) mouthpiece   Patient Position (Inhaler) sitting on edge of bed   Post Treatment Assessment (Inhaler) breath sounds unchanged   Signs of Intolerance (Inhaler) none   Respiratory Evaluation   $ Care Plan Tech Time 15 min

## 2020-07-30 NOTE — HOSPITAL COURSE
Patient admitted with acute on chronic respiratory failure and was found to be anemic  Patient had EGD which was normal  She had 3 units of pRBC transfusion  Colonoscopy showed lipoma and pedunculated polyp (awaiting biopsy results)  Later pt was discharged to home  She will see Pulmonology and GI MD as an OP

## 2020-07-30 NOTE — PROGRESS NOTES
[facility] Medicine  Progress Note    Patient Name: Liset Bailon  MRN: [id number]  Patient Class: IP- Inpatient   Admission Date: 7/28/2020  Length of Stay: 1 days  Attending Physician: Solomon Sethi MD  Primary Care Provider: Christine Atkins NP        Subjective:     Principal Problem:Acute anemia        HPI:  Patient getting admitted with acute on chronic resp failure  Patient is on Home O2 and CPAP at night  For past few days she has been experiencing dyspnea on exertion  Later symptoms went worse and she came to Hospital and got admitted  FOBT was positive and pt on NOAC  She denies any juan miguel GI bleed . Hb levels found to be on lower range     Overview/Hospital Course:  07/29  Pt back to baseline  GI Team planning EGD/Colonoscopy on Friday    Interval History:     Review of Systems   Constitutional: Negative for activity change and appetite change.   HENT: Negative for congestion and dental problem.    Eyes: Negative for discharge and itching.   Respiratory: Negative for shortness of breath.    Cardiovascular: Negative for chest pain.   Gastrointestinal: Negative for abdominal distention and abdominal pain.   Endocrine: Negative for cold intolerance.   Genitourinary: Negative for difficulty urinating and dysuria.   Musculoskeletal: Negative for arthralgias and back pain.   Skin: Negative for color change.   Neurological: Negative for dizziness and facial asymmetry.   Hematological: Negative for adenopathy.   Psychiatric/Behavioral: Negative for agitation and behavioral problems.     Objective:     Vital Signs (Most Recent):  Temp: 98.5 °F (36.9 °C) (07/29/20 1901)  Pulse: 82 (07/29/20 1901)  Resp: 20 (07/29/20 1901)  BP: (!) 141/68 (07/29/20 1901)  SpO2: 97 % (07/29/20 1901) Vital Signs (24h Range):  Temp:  [97.6 °F (36.4 °C)-98.7 °F (37.1 °C)] 98.5 °F (36.9 °C)  Pulse:  [64-85] 82  Resp:  [15-22] 20  SpO2:  [91 %-97 %] 97 %  BP: (121-160)/(58-85) 141/68     Weight: 128.5 kg (283 lb 4.7  oz)  Body mass index is 51.81 kg/m².    Intake/Output Summary (Last 24 hours) at 7/29/2020 2159  Last data filed at 7/29/2020 1720  Gross per 24 hour   Intake 1110 ml   Output 1350 ml   Net -240 ml      Physical Exam  Vitals signs and nursing note reviewed.   Constitutional:       General: She is not in acute distress.  HENT:      Head: Normocephalic.      Right Ear: External ear normal.      Left Ear: External ear normal.      Nose: Nose normal.      Mouth/Throat:      Mouth: Mucous membranes are moist.   Eyes:      Conjunctiva/sclera: Conjunctivae normal.   Neck:      Musculoskeletal: Neck supple.   Cardiovascular:      Rate and Rhythm: Normal rate.   Pulmonary:      Effort: Pulmonary effort is normal. No respiratory distress.   Abdominal:      General: There is no distension.   Musculoskeletal: Normal range of motion.   Skin:     General: Skin is warm.   Neurological:      Mental Status: She is alert and oriented to person, place, and time.   Psychiatric:         Behavior: Behavior normal.         Significant Labs:   CBC:   Recent Labs   Lab 07/28/20  0955 07/29/20  0456   WBC 9.09 7.55   HGB 7.4* 8.1*   HCT 27.9* 29.1*    234     CMP:   Recent Labs   Lab 07/28/20  0955 07/29/20  0456    139   K 4.0 3.8    96   CO2 31* 32*   * 158*   BUN 25* 25*   CREATININE 1.1 0.8   CALCIUM 9.5 8.8   PROT 7.7 7.1   ALBUMIN 4.0 3.6   BILITOT 0.4 0.9   ALKPHOS 80 75   AST 12 10   ALT 12 11   ANIONGAP 10 11   EGFRNONAA 55.9* >60.0       Significant Imaging: I have reviewed all pertinent imaging results/findings within the past 24 hours.      Assessment/Plan:      * Acute anemia  Hold NOAC  FOBT positive  Pt Had pRBC  EGD/Colonoscopy on Friday  Pt Stable      Paroxysmal atrial fibrillation  Hold NOAC  Continue other management      Respiratory failure with hypoxia and hypercapnia  Acute on chronic resp failure due to acute anemia  Had 2 U pRBC s  Will Transfuse 1 more Unit of pRBC  Continue  lasix      Morbid obesity  Need Therapeutic life style changes      Mild intermittent asthma without complication  Stable issue      Obesity hypoventilation syndrome  Along with JASKARAN  On CPAP      Type 2 diabetes mellitus, with long-term current use of insulin  Continue insulin regime        VTE Risk Mitigation (From admission, onward)         Ordered     IP VTE HIGH RISK PATIENT  Once      07/28/20 1118     Place sequential compression device  Until discontinued      07/28/20 1118                      Solomon Sethi MD  Department of Hospital Medicine   [facility]

## 2020-07-31 ENCOUNTER — ANESTHESIA (OUTPATIENT)
Dept: SURGERY | Facility: HOSPITAL | Age: 60
DRG: 811 | End: 2020-07-31
Payer: COMMERCIAL

## 2020-07-31 LAB
ALBUMIN SERPL BCP-MCNC: 3.7 G/DL (ref 3.5–5.2)
ALP SERPL-CCNC: 73 U/L (ref 55–135)
ALT SERPL W/O P-5'-P-CCNC: 12 U/L (ref 10–44)
ANION GAP SERPL CALC-SCNC: 8 MMOL/L (ref 8–16)
AST SERPL-CCNC: 12 U/L (ref 10–40)
BASOPHILS # BLD AUTO: 0.02 K/UL (ref 0–0.2)
BASOPHILS NFR BLD: 0.2 % (ref 0–1.9)
BILIRUB SERPL-MCNC: 0.9 MG/DL (ref 0.1–1)
BUN SERPL-MCNC: 20 MG/DL (ref 6–20)
CALCIUM SERPL-MCNC: 8.8 MG/DL (ref 8.7–10.5)
CHLORIDE SERPL-SCNC: 96 MMOL/L (ref 95–110)
CO2 SERPL-SCNC: 35 MMOL/L (ref 23–29)
CREAT SERPL-MCNC: 0.7 MG/DL (ref 0.5–1.4)
DIFFERENTIAL METHOD: ABNORMAL
EOSINOPHIL # BLD AUTO: 0.2 K/UL (ref 0–0.5)
EOSINOPHIL NFR BLD: 2.8 % (ref 0–8)
ERYTHROCYTE [DISTWIDTH] IN BLOOD BY AUTOMATED COUNT: 19.8 % (ref 11.5–14.5)
EST. GFR  (AFRICAN AMERICAN): >60 ML/MIN/1.73 M^2
EST. GFR  (NON AFRICAN AMERICAN): >60 ML/MIN/1.73 M^2
GLUCOSE SERPL-MCNC: 133 MG/DL (ref 70–110)
GLUCOSE SERPL-MCNC: 137 MG/DL (ref 70–110)
GLUCOSE SERPL-MCNC: 145 MG/DL (ref 70–110)
GLUCOSE SERPL-MCNC: 194 MG/DL (ref 70–110)
GLUCOSE SERPL-MCNC: 202 MG/DL (ref 70–110)
HCT VFR BLD AUTO: 31.5 % (ref 37–48.5)
HGB BLD-MCNC: 8.9 G/DL (ref 12–16)
IMM GRANULOCYTES # BLD AUTO: 0.04 K/UL (ref 0–0.04)
IMM GRANULOCYTES NFR BLD AUTO: 0.5 % (ref 0–0.5)
LYMPHOCYTES # BLD AUTO: 1.4 K/UL (ref 1–4.8)
LYMPHOCYTES NFR BLD: 17.1 % (ref 18–48)
MAGNESIUM SERPL-MCNC: 1.8 MG/DL (ref 1.6–2.6)
MCH RBC QN AUTO: 21.4 PG (ref 27–31)
MCHC RBC AUTO-ENTMCNC: 28.3 G/DL (ref 32–36)
MCV RBC AUTO: 76 FL (ref 82–98)
MONOCYTES # BLD AUTO: 0.5 K/UL (ref 0.3–1)
MONOCYTES NFR BLD: 6.5 % (ref 4–15)
NEUTROPHILS # BLD AUTO: 6 K/UL (ref 1.8–7.7)
NEUTROPHILS NFR BLD: 72.9 % (ref 38–73)
NRBC BLD-RTO: 0 /100 WBC
PLATELET # BLD AUTO: 251 K/UL (ref 150–350)
PMV BLD AUTO: 9.5 FL (ref 9.2–12.9)
POTASSIUM SERPL-SCNC: 4 MMOL/L (ref 3.5–5.1)
PROT SERPL-MCNC: 7 G/DL (ref 6–8.4)
RBC # BLD AUTO: 4.15 M/UL (ref 4–5.4)
SODIUM SERPL-SCNC: 139 MMOL/L (ref 136–145)
WBC # BLD AUTO: 8.19 K/UL (ref 3.9–12.7)

## 2020-07-31 PROCEDURE — 99900035 HC TECH TIME PER 15 MIN (STAT)

## 2020-07-31 PROCEDURE — 94761 N-INVAS EAR/PLS OXIMETRY MLT: CPT

## 2020-07-31 PROCEDURE — 25000003 PHARM REV CODE 250: Performed by: INTERNAL MEDICINE

## 2020-07-31 PROCEDURE — 43235 EGD DIAGNOSTIC BRUSH WASH: CPT | Performed by: INTERNAL MEDICINE

## 2020-07-31 PROCEDURE — 94640 AIRWAY INHALATION TREATMENT: CPT

## 2020-07-31 PROCEDURE — 27000671 HC TUBING MICROBORE EXT: Performed by: ANESTHESIOLOGY

## 2020-07-31 PROCEDURE — 63600175 PHARM REV CODE 636 W HCPCS: Performed by: NURSE ANESTHETIST, CERTIFIED REGISTERED

## 2020-07-31 PROCEDURE — 27000284 HC CANNULA NASAL: Performed by: ANESTHESIOLOGY

## 2020-07-31 PROCEDURE — 27000221 HC OXYGEN, UP TO 24 HOURS

## 2020-07-31 PROCEDURE — 36415 COLL VENOUS BLD VENIPUNCTURE: CPT

## 2020-07-31 PROCEDURE — 85025 COMPLETE CBC W/AUTO DIFF WBC: CPT

## 2020-07-31 PROCEDURE — 21400001 HC TELEMETRY ROOM

## 2020-07-31 PROCEDURE — 37000009 HC ANESTHESIA EA ADD 15 MINS: Performed by: INTERNAL MEDICINE

## 2020-07-31 PROCEDURE — 25000003 PHARM REV CODE 250: Performed by: NURSE ANESTHETIST, CERTIFIED REGISTERED

## 2020-07-31 PROCEDURE — 37000008 HC ANESTHESIA 1ST 15 MINUTES: Performed by: INTERNAL MEDICINE

## 2020-07-31 PROCEDURE — 27000675 HC TUBING MICRODRIP: Performed by: ANESTHESIOLOGY

## 2020-07-31 PROCEDURE — 45378 DIAGNOSTIC COLONOSCOPY: CPT | Performed by: INTERNAL MEDICINE

## 2020-07-31 PROCEDURE — 80053 COMPREHEN METABOLIC PANEL: CPT

## 2020-07-31 PROCEDURE — 63600175 PHARM REV CODE 636 W HCPCS: Performed by: INTERNAL MEDICINE

## 2020-07-31 PROCEDURE — 83735 ASSAY OF MAGNESIUM: CPT

## 2020-07-31 RX ORDER — POLYETHYLENE GLYCOL 3350, SODIUM CHLORIDE, SODIUM BICARBONATE, POTASSIUM CHLORIDE 420; 11.2; 5.72; 1.48 G/4L; G/4L; G/4L; G/4L
4000 POWDER, FOR SOLUTION ORAL ONCE
Status: DISCONTINUED | OUTPATIENT
Start: 2020-07-31 | End: 2020-07-31

## 2020-07-31 RX ORDER — SODIUM CHLORIDE 9 MG/ML
INJECTION, SOLUTION INTRAVENOUS CONTINUOUS
Status: DISCONTINUED | OUTPATIENT
Start: 2020-07-31 | End: 2020-08-01

## 2020-07-31 RX ORDER — FUROSEMIDE 10 MG/ML
20 INJECTION INTRAMUSCULAR; INTRAVENOUS ONCE
Status: COMPLETED | OUTPATIENT
Start: 2020-07-31 | End: 2020-07-31

## 2020-07-31 RX ORDER — SODIUM CHLORIDE 0.9 % (FLUSH) 0.9 %
2 SYRINGE (ML) INJECTION
Status: DISCONTINUED | OUTPATIENT
Start: 2020-07-31 | End: 2020-08-01

## 2020-07-31 RX ORDER — SODIUM CHLORIDE 9 MG/ML
INJECTION, SOLUTION INTRAVENOUS CONTINUOUS PRN
Status: DISCONTINUED | OUTPATIENT
Start: 2020-07-31 | End: 2020-07-31

## 2020-07-31 RX ORDER — POLYETHYLENE GLYCOL 3350 17 G/17G
170 POWDER, FOR SOLUTION ORAL ONCE
Status: COMPLETED | OUTPATIENT
Start: 2020-07-31 | End: 2020-07-31

## 2020-07-31 RX ORDER — PROPOFOL 10 MG/ML
VIAL (ML) INTRAVENOUS
Status: DISCONTINUED | OUTPATIENT
Start: 2020-07-31 | End: 2020-07-31

## 2020-07-31 RX ADMIN — INSULIN ASPART 4 UNITS: 100 INJECTION, SOLUTION INTRAVENOUS; SUBCUTANEOUS at 12:07

## 2020-07-31 RX ADMIN — PROPOFOL 50 MG: 10 INJECTION, EMULSION INTRAVENOUS at 09:07

## 2020-07-31 RX ADMIN — PROPOFOL 50 MG: 10 INJECTION, EMULSION INTRAVENOUS at 08:07

## 2020-07-31 RX ADMIN — ACETAMINOPHEN 650 MG: 325 TABLET, FILM COATED ORAL at 10:07

## 2020-07-31 RX ADMIN — MAGNESIUM OXIDE 400 MG: 400 TABLET ORAL at 10:07

## 2020-07-31 RX ADMIN — HYDROCHLOROTHIAZIDE 25 MG: 25 TABLET ORAL at 10:07

## 2020-07-31 RX ADMIN — FUROSEMIDE 20 MG: 10 INJECTION, SOLUTION INTRAMUSCULAR; INTRAVENOUS at 02:07

## 2020-07-31 RX ADMIN — DILTIAZEM HYDROCHLORIDE 180 MG: 180 CAPSULE, COATED, EXTENDED RELEASE ORAL at 10:07

## 2020-07-31 RX ADMIN — INSULIN ASPART 10 UNITS: 100 INJECTION, SOLUTION INTRAVENOUS; SUBCUTANEOUS at 05:07

## 2020-07-31 RX ADMIN — MAGNESIUM OXIDE 800 MG: 400 TABLET ORAL at 05:07

## 2020-07-31 RX ADMIN — FLUTICASONE FUROATE AND VILANTEROL TRIFENATATE 1 PUFF: 100; 25 POWDER RESPIRATORY (INHALATION) at 07:07

## 2020-07-31 RX ADMIN — MAGNESIUM OXIDE 800 MG: 400 TABLET ORAL at 12:07

## 2020-07-31 RX ADMIN — INSULIN ASPART 10 UNITS: 100 INJECTION, SOLUTION INTRAVENOUS; SUBCUTANEOUS at 12:07

## 2020-07-31 RX ADMIN — ATORVASTATIN CALCIUM 20 MG: 20 TABLET, FILM COATED ORAL at 09:07

## 2020-07-31 RX ADMIN — POLYETHYLENE GLYCOL 3350 170 G: 17 POWDER, FOR SOLUTION ORAL at 05:07

## 2020-07-31 RX ADMIN — SODIUM CHLORIDE: 0.9 INJECTION, SOLUTION INTRAVENOUS at 08:07

## 2020-07-31 RX ADMIN — PANTOPRAZOLE SODIUM 40 MG: 40 TABLET, DELAYED RELEASE ORAL at 05:07

## 2020-07-31 RX ADMIN — FLUTICASONE PROPIONATE 100 MCG: 50 SPRAY, METERED NASAL at 10:07

## 2020-07-31 RX ADMIN — LEVOTHYROXINE SODIUM 25 MCG: 25 TABLET ORAL at 05:07

## 2020-07-31 NOTE — PROGRESS NOTES
[facility] Medicine  Progress Note    Patient Name: Liset Bailon  MRN: [id number]  Patient Class: IP- Inpatient   Admission Date: 7/28/2020  Length of Stay: 2 days  Attending Physician: Solomon Sethi MD  Primary Care Provider: Christine Atkins NP        Subjective:     Principal Problem:Acute anemia        HPI:  Patient getting admitted with acute on chronic resp failure  Patient is on Home O2 and CPAP at night  For past few days she has been experiencing dyspnea on exertion  Later symptoms went worse and she came to Hospital and got admitted  FOBT was positive and pt on NOAC  She denies any juan miguel GI bleed . Hb levels found to be on lower range     Overview/Hospital Course:  07/29  Pt back to baseline  GI Team planning EGD/Colonoscopy on Friday 07/30  No new issues  Says she is tired today  Wants to eat food before GI procedures    Interval History:     Review of Systems   Constitutional: Negative for activity change and appetite change.   HENT: Negative for congestion and dental problem.    Eyes: Negative for discharge and itching.   Respiratory: Negative for shortness of breath.    Cardiovascular: Negative for chest pain.   Gastrointestinal: Negative for abdominal distention and abdominal pain.   Endocrine: Negative for cold intolerance.   Genitourinary: Negative for difficulty urinating and dysuria.   Musculoskeletal: Negative for arthralgias and back pain.   Skin: Negative for color change.   Neurological: Negative for dizziness and facial asymmetry.   Hematological: Negative for adenopathy.   Psychiatric/Behavioral: Negative for agitation and behavioral problems.     Objective:     Vital Signs (Most Recent):  Temp: 97.9 °F (36.6 °C) (07/30/20 1901)  Pulse: 82 (07/30/20 1901)  Resp: 20 (07/30/20 1901)  BP: 125/63 (07/30/20 1901)  SpO2: 95 % (07/30/20 1901) Vital Signs (24h Range):  Temp:  [97.3 °F (36.3 °C)-99 °F (37.2 °C)] 97.9 °F (36.6 °C)  Pulse:  [73-82] 82  Resp:  [14-20] 20  SpO2:  [92  %-99 %] 95 %  BP: (125-166)/(63-77) 125/63     Weight: 127.2 kg (280 lb 6.8 oz)  Body mass index is 51.29 kg/m².    Intake/Output Summary (Last 24 hours) at 7/30/2020 2128  Last data filed at 7/30/2020 1542  Gross per 24 hour   Intake 640 ml   Output 850 ml   Net -210 ml      Physical Exam  Vitals signs and nursing note reviewed.   Constitutional:       General: She is not in acute distress.  HENT:      Head: Normocephalic.      Right Ear: External ear normal.      Left Ear: External ear normal.      Nose: Nose normal.      Mouth/Throat:      Mouth: Mucous membranes are moist.   Eyes:      Conjunctiva/sclera: Conjunctivae normal.   Neck:      Musculoskeletal: Neck supple.   Cardiovascular:      Rate and Rhythm: Normal rate.   Pulmonary:      Effort: Pulmonary effort is normal.      Breath sounds: Normal breath sounds.   Abdominal:      General: Bowel sounds are normal.      Palpations: Abdomen is soft.   Musculoskeletal: Normal range of motion.   Skin:     General: Skin is warm.   Neurological:      Mental Status: She is alert and oriented to person, place, and time.   Psychiatric:         Mood and Affect: Mood normal.         Significant Labs:   CBC:   Recent Labs   Lab 07/29/20  0456 07/30/20  0458   WBC 7.55 7.60   HGB 8.1* 9.0*   HCT 29.1* 31.3*    244     CMP:   Recent Labs   Lab 07/29/20  0456 07/30/20  0458    139   K 3.8 3.8   CL 96 94*   CO2 32* 34*   * 111*   BUN 25* 20   CREATININE 0.8 0.7   CALCIUM 8.8 9.3   PROT 7.1 7.3   ALBUMIN 3.6 3.8   BILITOT 0.9 0.7   ALKPHOS 75 79   AST 10 11   ALT 11 13   ANIONGAP 11 11   EGFRNONAA >60.0 >60.0       Significant Imaging: I have reviewed all pertinent imaging results/findings within the past 24 hours.      Assessment/Plan:      * Acute anemia  Hold NOAC  FOBT positive  Pt Had pRBC  EGD/Colonoscopy on Friday  Pt Stable      Paroxysmal atrial fibrillation  Hold NOAC  Continue other management      Respiratory failure with hypoxia and  hypercapnia  Acute on chronic resp failure due to acute anemia  Had 3 U pRBC s        Morbid obesity  Need Therapeutic life style changes      Mild intermittent asthma without complication  Stable issue      Obesity hypoventilation syndrome  Along with JASKARAN  On CPAP      Type 2 diabetes mellitus, with long-term current use of insulin  Continue insulin regime        VTE Risk Mitigation (From admission, onward)         Ordered     IP VTE HIGH RISK PATIENT  Once      07/28/20 1118     Place sequential compression device  Until discontinued      07/28/20 1118                      Solomon Sethi MD  Department of Hospital Medicine   [facility]

## 2020-07-31 NOTE — ASSESSMENT & PLAN NOTE
NOAC on Hold  Patient today had EGD which was normal  Because of poor prep colonoscopy was unsuccessful  Pt scheduled for colonoscopy again Tmrw AM

## 2020-07-31 NOTE — PROGRESS NOTES
"Pulmonary/Critical Care Progress Note      Patient name: Liset Bailon  MRN: [id number]  Date: 07/31/2020    Admit Date: 7/28/2020  Consult Requested By: Solomon Sethi MD    Reason for Consult: Dyspnea, chronic hypoxemic respiratory failure, JASKARAN, OHV, anemia    HPI:    7/28/2020 - 58 yo female well known to me with JASKARAN (recently got new CPAP with new settings and has been wearing), OHV, "mild asthma".  For the last few weeks she has felt more dyspneic and was to see me tomorrow but symptoms worsened and she came to ER and was found to be anemic and is admitted for further evaluation.  No fever, chills, sweats, cough, sputum, wheezing.  Has had some mild peripheral edema (better today).  No other new symptoms, no GI bleeding reported.  She states that she has been iron deficient in the past but has been off of iron for " a while".    7/29/2020 - Stable overnight, got 2 U PRBC but hgb only increased 7.4 - 8.1.  She has not been very active yet so difficult to assess her dyspnea.  She denies any melena, BRBPR, hematochezia.  She states that she was seen by GI and they are considering upper and lower endoscopy.  No new complaints today.    7/30/2020 - Stable overnight still has dyspnea (this is a chronic complaint for her), no worsening of this symptom.  She ahs some issues with AM headaches which she relates to her CPAP.  She feels that she has some nasal congestion and may be coming down with a "cold".  H/H better after her 3rd unit PRBC    7/31/2020 - Stable overnight, feels about the same - to have endoscopy today.  She seems a little depressed today.    Review of Systems    Review of Systems   Constitutional: Positive for malaise/fatigue. Negative for chills, diaphoresis, fever and weight loss.   HENT: Positive for hearing loss. Negative for congestion, ear discharge, ear pain, nosebleeds, sinus pain, sore throat and tinnitus.    Eyes: Negative for pain.   Respiratory: Positive for cough and shortness of breath. Negative " for hemoptysis, sputum production, wheezing and stridor.    Cardiovascular: Positive for leg swelling. Negative for chest pain, palpitations, orthopnea, claudication and PND.   Gastrointestinal: Negative for abdominal pain, blood in stool, constipation, diarrhea, heartburn, nausea and vomiting.   Genitourinary: Negative for dysuria, frequency, hematuria and urgency.   Musculoskeletal: Negative for falls and myalgias.   Skin: Negative for itching and rash.   Neurological: Negative for dizziness, tingling, tremors, sensory change, speech change, focal weakness, seizures, loss of consciousness, weakness and headaches.   Psychiatric/Behavioral: Negative for depression, substance abuse and suicidal ideas. The patient is not nervous/anxious.        Past Medical History    Past Medical History:   Diagnosis Date    Acute on chronic respiratory failure 2019    Anemia 2020    Arthritis     Asthma     Chronic bronchitis     Diabetes mellitus     Hyperlipidemia     Hypertension     Pain in joint of left shoulder region 2018    Sleep apnea     Thyroid disease        Past Surgical History    Past Surgical History:   Procedure Laterality Date    APPENDECTOMY       SECTION      x3    HYSTERECTOMY      OOPHORECTOMY Bilateral     age 42       Medications (scheduled):      atorvastatin  20 mg Oral QHS    diltiaZEM  180 mg Oral Daily    fluticasone furoate-vilanteroL  1 puff Inhalation Daily    fluticasone propionate  2 spray Each Nostril Daily    furosemide (LASIX) IV  20 mg Intravenous Once    hydroCHLOROthiazide  25 mg Oral Daily    insulin aspart U-100  10 Units Subcutaneous TIDWM    insulin detemir U-100  30 Units Subcutaneous QHS    levothyroxine  25 mcg Oral Daily    magnesium oxide  400 mg Oral Daily    pantoprazole  40 mg Oral Daily    peg-electrolyte soln  4,000 mL Oral Once    senna-docusate 8.6-50 mg  1 tablet Oral BID       Medications (infusions):      sodium chloride  "0.9%      sodium chloride 0.9%         Medications (prn):     sodium chloride, sodium chloride, acetaminophen, albuterol, dextrose 50%, dextrose 50%, glucagon (human recombinant), glucose, glucose, insulin aspart U-100, levalbuterol, magnesium oxide, magnesium oxide, ondansetron, potassium chloride 10%, potassium chloride 10%, sodium chloride 0.9%    Family History:   Family History   Problem Relation Age of Onset    Pancreatic cancer Mother     Breast cancer Neg Hx     Colon cancer Neg Hx     Ovarian cancer Neg Hx        Social History: Tobacco:   Social History     Tobacco Use   Smoking Status Never Smoker   Smokeless Tobacco Never Used                                EtOH:   Social History     Substance and Sexual Activity   Alcohol Use Yes    Comment: occasional                                Drugs:   Social History     Substance and Sexual Activity   Drug Use No                                Occupation: not working                             Asbestos exposure: no    Physical Exam    Vital signs:  Temp:  [96.8 °F (36 °C)-98.7 °F (37.1 °C)]   Pulse:  []   Resp:  [17-28]   BP: (125-176)/(63-82)   SpO2:  [95 %-100 %]     Intake/Output:     Intake/Output Summary (Last 24 hours) at 7/31/2020 1311  Last data filed at 7/31/2020 0928  Gross per 24 hour   Intake 540 ml   Output --   Net 540 ml        BMI: Estimated body mass index is 51.01 kg/m² as calculated from the following:    Height as of an earlier encounter on 7/28/20: 5' 2" (1.575 m).    Weight as of this encounter: 126.5 kg (278 lb 14.1 oz).    Physical Exam   Constitutional: She is oriented to person, place, and time. No distress.   Overweight female, nad, aao x 3   HENT:   Head: Normocephalic and atraumatic.   Right Ear: External ear normal.   Left Ear: External ear normal.   Eyes: Pupils are equal, round, and reactive to light. Conjunctivae and EOM are normal. Right eye exhibits no discharge. Left eye exhibits no discharge. No scleral icterus. "   Neck: Normal range of motion. Neck supple. No JVD present. No tracheal deviation present. No thyromegaly present.   Cardiovascular: Normal rate, regular rhythm and intact distal pulses. Exam reveals no gallop and no friction rub.   Murmur heard.  Pulmonary/Chest: Effort normal and breath sounds normal. No stridor. No respiratory distress. She has no wheezes. She has no rales.   Decreased at bases   Abdominal: Soft. Bowel sounds are normal. She exhibits no distension. There is no abdominal tenderness. There is no rebound and no guarding.   obese   Musculoskeletal: Normal range of motion.         General: No tenderness or edema.      Comments: Wearing compression hose   Lymphadenopathy:     She has no cervical adenopathy.   Neurological: She is alert and oriented to person, place, and time.   Skin: Skin is warm and dry. She is not diaphoretic.   Psychiatric: Mood, memory, affect and judgment normal.   Nursing note and vitals reviewed.      Laboratory    Recent Labs   Lab 07/31/20  0434   WBC 8.19   RBC 4.15   HGB 8.9*   HCT 31.5*      MCV 76*   MCH 21.4*   MCHC 28.3*       Recent Labs   Lab 07/31/20  0434   CALCIUM 8.8   PROT 7.0      K 4.0   CO2 35*   CL 96   BUN 20   CREATININE 0.7   ALKPHOS 73   ALT 12   AST 12   BILITOT 0.9       No results for input(s): PT, INR, APTT in the last 24 hours.    No results for input(s): CPK, CPKMB, TROPONINI, MB in the last 24 hours.    Additional labs:     reviewed    Microbiology:       Microbiology Results (last 7 days)     ** No results found for the last 168 hours. **          Radiology        Additional Studies    EKG (7/27)  Vent. Rate : 087 BPM     Atrial Rate : 087 BPM      P-R Int : 164 ms          QRS Dur : 096 ms       QT Int : 364 ms       P-R-T Axes : 023 -44 079 degrees      QTc Int : 438 ms     Normal sinus rhythm   Left axis deviation   LVH with repolarization abnormality   Abnormal ECG   When compared with ECG of 27-NOV-2019 12:33,   Sinus rhythm has  replaced Atrial fibrillation   Questionable change in QRS duration   (no S1Q3T3)    ECHO (7/28)  · Normal left ventricular systolic function. The estimated ejection fraction is 55%.  · Normal LV diastolic function.  · No wall motion abnormalities.  · Normal right ventricular systolic function.  · Mild tricuspid regurgitation.  · No pulmonary hypertension present.  · Normal central venous pressure (3 mmHg).    Ventilator Information              No results for input(s): PH, PCO2, PO2, HCO3, POCSATURATED, BE in the last 72 hours.      Impression    Active Hospital Problems    Diagnosis  POA    *Acute anemia [D64.9]  Unknown    Paroxysmal atrial fibrillation [I48.0]  Yes     Priority: 5     Mild intermittent asthma without complication [J45.20]  Yes    Obesity hypoventilation syndrome [E66.2]  Yes    Respiratory failure with hypoxia and hypercapnia [J96.91, J96.92]  Yes    Type 2 diabetes mellitus, with long-term current use of insulin [E11.9, Z79.4]  Not Applicable    Morbid obesity [E66.01]  Yes      Resolved Hospital Problems   No resolved problems to display.       Plan    · Dyspnea probably back at her baseline and is multifactorial  · Await GI workup - for endoscopies   · Follow H/H  · Better BP control  · Asthma is mild by history and stable with no exacerbation  · Continue with CPAP  · Needs weight loss/exercise  · ECHO noted  · Increase activity as able    Respiratory status is stable, I am off weekend and Dr Page is covering please call if needed    Thank you for this consult.  I will follow with you while the patient is hospitalized.  Please call ([phone]) if you have any questions.    Percy Guzmán MD

## 2020-07-31 NOTE — PLAN OF CARE
07/31/20 0733   Patient Assessment/Suction   Level of Consciousness (AVPU) alert   Respiratory Effort Unlabored   All Lung Fields Breath Sounds diminished;clear   PRE-TX-O2   O2 Device (Oxygen Therapy) CPAP   $ Is the patient on Low Flow Oxygen? Yes   Flow (L/min) 2   SpO2 95 %   Pulse Oximetry Type Intermittent   $ Pulse Oximetry - Multiple Charge Pulse Oximetry - Multiple   Pulse 76   Resp 17   Inhaler   $ Inhaler Charges MDI (Metered Dose Inahler) Treatment   Daily Review of Necessity (Inhaler) completed   Respiratory Treatment Status (Inhaler) given   Treatment Route (Inhaler) mouthpiece   Patient Position (Inhaler) HOB elevated   Post Treatment Assessment (Inhaler) breath sounds unchanged   Signs of Intolerance (Inhaler) none

## 2020-07-31 NOTE — RESPIRATORY THERAPY
07/30/20 2004   Patient Assessment/Suction   Respiratory Effort Unlabored   All Lung Fields Breath Sounds wheezes, expiratory   Rhythm/Pattern, Respiratory pattern regular   PRE-TX-O2   O2 Device (Oxygen Therapy) nasal cannula   Flow (L/min) 2   SpO2 96 %   Pulse 79   Resp 20   Aerosol Therapy   $ Aerosol Therapy Charges Refused   Respiratory Evaluation   $ Care Plan Tech Time 15 min   Admitting Diagnosis acute anemia

## 2020-07-31 NOTE — SUBJECTIVE & OBJECTIVE
Interval History:     Review of Systems   Constitutional: Negative for activity change and appetite change.   HENT: Negative for congestion and dental problem.    Eyes: Negative for discharge and itching.   Respiratory: Negative for shortness of breath.    Cardiovascular: Negative for chest pain.   Gastrointestinal: Negative for abdominal distention and abdominal pain.   Endocrine: Negative for cold intolerance.   Genitourinary: Negative for difficulty urinating and dysuria.   Musculoskeletal: Negative for arthralgias and back pain.   Skin: Negative for color change.   Neurological: Negative for dizziness and facial asymmetry.   Hematological: Negative for adenopathy.   Psychiatric/Behavioral: Negative for agitation and behavioral problems.     Objective:     Vital Signs (Most Recent):  Temp: 97.7 °F (36.5 °C) (07/31/20 1500)  Pulse: 61 (07/31/20 1500)  Resp: 20 (07/31/20 1500)  BP: 127/60 (07/31/20 1500)  SpO2: (!) 93 % (07/31/20 1500) Vital Signs (24h Range):  Temp:  [96.8 °F (36 °C)-98.7 °F (37.1 °C)] 97.7 °F (36.5 °C)  Pulse:  [] 61  Resp:  [17-28] 20  SpO2:  [93 %-100 %] 93 %  BP: (125-176)/(60-82) 127/60     Weight: 126.5 kg (278 lb 14.1 oz)  Body mass index is 51.01 kg/m².    Intake/Output Summary (Last 24 hours) at 7/31/2020 1811  Last data filed at 7/31/2020 0928  Gross per 24 hour   Intake 300 ml   Output --   Net 300 ml      Physical Exam  Vitals signs and nursing note reviewed.   Constitutional:       General: She is not in acute distress.  HENT:      Head: Atraumatic.      Right Ear: External ear normal.      Left Ear: External ear normal.      Nose: Nose normal.      Mouth/Throat:      Mouth: Mucous membranes are moist.   Eyes:      Conjunctiva/sclera: Conjunctivae normal.   Neck:      Musculoskeletal: Neck supple.   Cardiovascular:      Rate and Rhythm: Normal rate.   Pulmonary:      Effort: Pulmonary effort is normal.   Abdominal:      General: There is no distension.   Musculoskeletal: Normal  range of motion.   Skin:     General: Skin is warm.   Neurological:      Mental Status: She is alert and oriented to person, place, and time.   Psychiatric:         Behavior: Behavior normal.         Significant Labs:   CBC:   Recent Labs   Lab 07/30/20 0458 07/31/20 0434   WBC 7.60 8.19   HGB 9.0* 8.9*   HCT 31.3* 31.5*    251     CMP:   Recent Labs   Lab 07/30/20 0458 07/31/20 0434    139   K 3.8 4.0   CL 94* 96   CO2 34* 35*   * 133*   BUN 20 20   CREATININE 0.7 0.7   CALCIUM 9.3 8.8   PROT 7.3 7.0   ALBUMIN 3.8 3.7   BILITOT 0.7 0.9   ALKPHOS 79 73   AST 11 12   ALT 13 12   ANIONGAP 11 8   EGFRNONAA >60.0 >60.0       Significant Imaging: I have reviewed all pertinent imaging results/findings within the past 24 hours.

## 2020-07-31 NOTE — TRANSFER OF CARE
"Anesthesia Transfer of Care Note    Patient: Liset Bailon    Procedure(s) Performed: Procedure(s) (LRB):  EGD (ESOPHAGOGASTRODUODENOSCOPY) (Left)  COLONOSCOPY (N/A)    Patient location: GI    Anesthesia Type: MAC    Transport from OR: Transported from OR on room air with adequate spontaneous ventilation    Post pain: adequate analgesia    Post assessment: no apparent anesthetic complications    Post vital signs: stable    Level of consciousness: awake and alert    Nausea/Vomiting: no nausea/vomiting    Complications: none    Transfer of care protocol was followed      Last vitals:   Visit Vitals  BP (!) 142/78 (BP Location: Left arm, Patient Position: Lying)   Pulse 100   Temp 36 °C (96.8 °F) (Axillary)   Resp 20   Ht 5' 2" (1.575 m)   Wt 126.5 kg (278 lb 14.1 oz)   LMP  (LMP Unknown)   SpO2 97%   Breastfeeding No   BMI 51.01 kg/m²     "

## 2020-07-31 NOTE — [employer] PATIENT INSTRUCTIONS
Discharge Summary/Instructions after an Endoscopic Procedure  Patient Name: Liset Bailon  Patient MRN: [id number]  Patient YOB: 1960 Friday, July 31, 2020  Maicol Ortiz MD  RESTRICTIONS:  During your procedure today, you received medications for sedation.  These   medications may affect your judgment, balance and coordination.  Therefore,   for 24 hours, you have the following restrictions:   - DO NOT drive a car, operate machinery, make legal/financial decisions,   sign important papers or drink alcohol.    ACTIVITY:  Today: no heavy lifting, straining or running due to procedural   sedation/anesthesia.  The following day: return to full activity including work.  DIET:  Eat and drink normally unless instructed otherwise.     TREATMENT FOR COMMON SIDE EFFECTS:  - Mild abdominal pain, nausea, belching, bloating or excessive gas:  rest,   eat lightly and use a heating pad.  - Sore Throat: treat with throat lozenges and/or gargle with warm salt   water.  - Because air was used during the procedure, expelling large amounts of air   from your rectum or belching is normal.  - If a bowel prep was taken, you may not have a bowel movement for 1-3 days.    This is normal.  SYMPTOMS TO WATCH FOR AND REPORT TO YOUR PHYSICIAN:  1. Abdominal pain or bloating, other than gas cramps.  2. Chest pain.  3. Back pain.  4. Signs of infection such as: chills or fever occurring within 24 hours   after the procedure.  5. Rectal bleeding, which would show as bright red, maroon, or black stools.   (A tablespoon of blood from the rectum is not serious, especially if   hemorrhoids are present.)  6. Vomiting.  7. Weakness or dizziness.  GO DIRECTLY TO THE NEAREST EMERGENCY ROOM IF YOU HAVE ANY OF THE FOLLOWING:      Difficulty breathing              Chills and/or fever over 101 F   Persistent vomiting and/or vomiting blood   Severe abdominal pain   Severe chest pain   Black, tarry stools   Bleeding- more than one tablespoon   Any  other symptom or condition that you feel may need urgent attention  Your doctor recommends these additional instructions:  If any biopsies were taken, your doctors clinic will contact you in 1 to 2   weeks with any results.  - Patient has a contact number available for emergencies.  The signs and   symptoms of potential delayed complications were discussed with the   patient.  Return to normal activities tomorrow.  Written discharge   instructions were provided to the patient.   - Resume previous diet.   - Continue present medications.   - Return patient to hospital [facility] for ongoing care.  For questions, problems or results please call your physician - Maicol Ortiz MD at Work:  [phone].  Former[facility], EMERGENCY ROOM PHONE NUMBER: [phone]  IF A COMPLICATION OR EMERGENCY SITUATION ARISES AND YOU ARE UNABLE TO REACH   YOUR PHYSICIAN - GO DIRECTLY TO THE EMERGENCY ROOM.  MD Maicol Coker MD  7/31/2020 9:28:14 AM  This report has been verified and signed electronically.  [employer]

## 2020-07-31 NOTE — SUBJECTIVE & OBJECTIVE
Interval History:     Review of Systems   Constitutional: Negative for activity change and appetite change.   HENT: Negative for congestion and dental problem.    Eyes: Negative for discharge and itching.   Respiratory: Negative for shortness of breath.    Cardiovascular: Negative for chest pain.   Gastrointestinal: Negative for abdominal distention and abdominal pain.   Endocrine: Negative for cold intolerance.   Genitourinary: Negative for difficulty urinating and dysuria.   Musculoskeletal: Negative for arthralgias and back pain.   Skin: Negative for color change.   Neurological: Negative for dizziness and facial asymmetry.   Hematological: Negative for adenopathy.   Psychiatric/Behavioral: Negative for agitation and behavioral problems.     Objective:     Vital Signs (Most Recent):  Temp: 97.9 °F (36.6 °C) (07/30/20 1901)  Pulse: 82 (07/30/20 1901)  Resp: 20 (07/30/20 1901)  BP: 125/63 (07/30/20 1901)  SpO2: 95 % (07/30/20 1901) Vital Signs (24h Range):  Temp:  [97.3 °F (36.3 °C)-99 °F (37.2 °C)] 97.9 °F (36.6 °C)  Pulse:  [73-82] 82  Resp:  [14-20] 20  SpO2:  [92 %-99 %] 95 %  BP: (125-166)/(63-77) 125/63     Weight: 127.2 kg (280 lb 6.8 oz)  Body mass index is 51.29 kg/m².    Intake/Output Summary (Last 24 hours) at 7/30/2020 2128  Last data filed at 7/30/2020 1542  Gross per 24 hour   Intake 640 ml   Output 850 ml   Net -210 ml      Physical Exam  Vitals signs and nursing note reviewed.   Constitutional:       General: She is not in acute distress.  HENT:      Head: Normocephalic.      Right Ear: External ear normal.      Left Ear: External ear normal.      Nose: Nose normal.      Mouth/Throat:      Mouth: Mucous membranes are moist.   Eyes:      Conjunctiva/sclera: Conjunctivae normal.   Neck:      Musculoskeletal: Neck supple.   Cardiovascular:      Rate and Rhythm: Normal rate.   Pulmonary:      Effort: Pulmonary effort is normal.      Breath sounds: Normal breath sounds.   Abdominal:      General: Bowel  sounds are normal.      Palpations: Abdomen is soft.   Musculoskeletal: Normal range of motion.   Skin:     General: Skin is warm.   Neurological:      Mental Status: She is alert and oriented to person, place, and time.   Psychiatric:         Mood and Affect: Mood normal.         Significant Labs:   CBC:   Recent Labs   Lab 07/29/20 0456 07/30/20 0458   WBC 7.55 7.60   HGB 8.1* 9.0*   HCT 29.1* 31.3*    244     CMP:   Recent Labs   Lab 07/29/20 0456 07/30/20 0458    139   K 3.8 3.8   CL 96 94*   CO2 32* 34*   * 111*   BUN 25* 20   CREATININE 0.8 0.7   CALCIUM 8.8 9.3   PROT 7.1 7.3   ALBUMIN 3.6 3.8   BILITOT 0.9 0.7   ALKPHOS 75 79   AST 10 11   ALT 11 13   ANIONGAP 11 11   EGFRNONAA >60.0 >60.0       Significant Imaging: I have reviewed all pertinent imaging results/findings within the past 24 hours.

## 2020-07-31 NOTE — PROGRESS NOTES
"[facility]  Adult Nutrition   Progress Note (Initial Assessment)     SUMMARY     Recommendations/Interventions:    Recommendation/Intervention: 1. Advance diet as medically able per MD, add DM/Cardiac Diet restrictions. 2. RD to monitor # of days NPO and make recommendations accordingly.  Goals: 1. Diet to advance and patient to meet at least 75% of estimated needs via PO intake of meals.  Nutrition Goal Status: new    Dietitian Rounds Brief:  · Seen 2' LOS. Presented to ED with worsening dyspnea for further workup per pulmonology. S/p 3 PRBC-H/H better. Endoscopy scheduled for today-off floor during rounds. BMI 51-will provide diet education prior to discharge. Will continue to monitor NPO status,  intake, labs, and plan of care.  Reason for Assessment  Reason For Assessment: length of stay  Diagnosis: (acute anemia)  Relevant Medical History: Acute on chronic respiratory failure, chronic bronchitis, DM, HLD, HTN, thyroid disease  Interdisciplinary Rounds: attended    Nutrition Risk Screen  Nutrition Risk Screen: no indicators present    Nutrition/Diet History  Food Allergies: NKFA  Factors Affecting Nutritional Intake: NPO    Anthropometrics  Temp: 98.2 °F (36.8 °C)  Height Method: Stated  Height: 5' 2" (157.5 cm)  Height (inches): 62 in  Weight Method: Bed Scale  Weight: 126.5 kg (278 lb 14.1 oz)  Weight (lb): 278.88 lb  Ideal Body Weight (IBW), Female: 110 lb  % Ideal Body Weight, Female (lb): 252.13 %  BMI (Calculated): 51  BMI Grade: greater than 40 - morbid obesity     Weight History:  Wt Readings from Last 10 Encounters:   07/31/20 126.5 kg (278 lb 14.1 oz)   07/28/20 125.6 kg (277 lb)   06/18/20 (P) 124.7 kg (275 lb)   11/27/19 127.5 kg (281 lb 1.4 oz)   11/27/19 123.4 kg (272 lb)   08/13/19 122 kg (269 lb)   08/13/19 122 kg (269 lb)   06/20/19 122 kg (269 lb)   01/11/19 120 kg (264 lb 7.1 oz)   10/02/18 113.4 kg (250 lb)     Lab/Procedures/Meds: Pertinent Labs Reviewed  Clinical " Chemistry:  Recent Labs   Lab 07/31/20  0434      K 4.0   CL 96   CO2 35*   *   BUN 20   CREATININE 0.7   CALCIUM 8.8   PROT 7.0   ALBUMIN 3.7   BILITOT 0.9   ALKPHOS 73   AST 12   ALT 12   ANIONGAP 8   ESTGFRAFRICA >60.0   EGFRNONAA >60.0   MG 1.8     CBC:   Recent Labs   Lab 07/31/20  0434   WBC 8.19   RBC 4.15   HGB 8.9*   HCT 31.5*      MCV 76*   MCH 21.4*   MCHC 28.3*   Cardiac Profile:  Recent Labs   Lab 07/28/20  0955   BNP 50   TROPONINI <0.030     Lab Results   Component Value Date    HGBA1C 8.0 (H) 11/28/2019     Medications: Pertinent Medications reviewed  Scheduled Meds:   atorvastatin  20 mg Oral QHS    diltiaZEM  180 mg Oral Daily    fluticasone furoate-vilanteroL  1 puff Inhalation Daily    fluticasone propionate  2 spray Each Nostril Daily    hydroCHLOROthiazide  25 mg Oral Daily    insulin aspart U-100  10 Units Subcutaneous TIDWM    insulin detemir U-100  30 Units Subcutaneous QHS    levothyroxine  25 mcg Oral Daily    magnesium oxide  400 mg Oral Daily    pantoprazole  40 mg Oral Daily    senna-docusate 8.6-50 mg  1 tablet Oral BID     Continuous Infusions:  PRN Meds:.sodium chloride, sodium chloride, acetaminophen, albuterol, dextrose 50%, dextrose 50%, glucagon (human recombinant), glucose, glucose, insulin aspart U-100, levalbuterol, magnesium oxide, magnesium oxide, ondansetron, potassium chloride 10%, potassium chloride 10%    Estimated/Assessed Needs    Weight Used For Calorie Calculations: 126.5 kg (278 lb 14.1 oz)  Energy Calorie Requirements (kcal): 8633-0828 kcals/day (15-20 kcals/kg)  Energy Need Method: Kcal/kg  Protein Requirements:  g/day (1.5-2.0 g/kg IBW)  Weight Used For Protein Calculations: 49.9 kg (110 lb)    Nutrition Prescription Ordered    Current Diet Order: NPO    Evaluation of Received Nutrient/Fluid Intake    Energy Calories Required: not meeting needs  Protein Required: not meeting needs  Fluid Required: meeting needs  Tolerance:  (NPO)  % Intake of Estimated Energy Needs: 0%  % Meal Intake: NPO    Intake/Output Summary (Last 24 hours) at 7/31/2020 0803  Last data filed at 7/30/2020 1542  Gross per 24 hour   Intake 240 ml   Output 850 ml   Net -610 ml      Nutrition Risk    Level of Risk/Frequency of Follow-up: high   Monitor and Evaluation    Food and Nutrient Adminstration: diet order  Knowledge/Beliefs/Attitudes: beliefs and attitudes, food and nutrition knowledge/skill  Physical Activity and Function: nutrition-related ADLs and IADLs, factors affecting access to physical activity  Anthropometric Measurements: weight, weight change, body mass index  Biochemical Data, Medical Tests and Procedures: electrolyte and renal panel, lipid profile, gastrointestinal profile, glucose/endocrine profile, inflammatory profile  Nutrition-Focused Physical Findings: overall appearance     Nutrition Follow-Up    RD Follow-up?: Yes  Dione Cabral RD 07/31/2020 9:20 AM

## 2020-07-31 NOTE — ANESTHESIA POSTPROCEDURE EVALUATION
Anesthesia Post Evaluation    Patient: Liset Bailon    Procedure(s) Performed: Procedure(s) (LRB):  EGD (ESOPHAGOGASTRODUODENOSCOPY) (Left)  COLONOSCOPY (N/A)    Final Anesthesia Type: MAC    Patient location during evaluation: GI PACU  Patient participation: Yes- Able to Participate  Level of consciousness: awake and alert  Post-procedure vital signs: reviewed and stable  Pain management: adequate  Airway patency: patent    PONV status at discharge: No PONV  Anesthetic complications: no      Cardiovascular status: stable  Respiratory status: unassisted  Hydration status: euvolemic  Follow-up not needed.          Vitals Value Taken Time   /67 07/31/20 1029   Temp  07/31/20 1031   Pulse 92 07/31/20 0946   Resp 28 07/31/20 0946   SpO2 96 % 07/31/20 0946         Event Time   Out of Recovery 07/31/2020 10:00:00         Pain/Sierra Score: No data recorded

## 2020-07-31 NOTE — PLAN OF CARE
Plan of care reviewed with the patient. Cardiac monitoring. Vital signs and lab monitoring. Increase activity as tolerated. Safety precautions in place. Bed alarm refused. High fall risk.  Accu-checks as ordered. Breathing treatments and oxygen therapy as ordered/needed. Strict I&O's and daily weights.

## 2020-07-31 NOTE — PROGRESS NOTES
[facility]  Progress Note    Patient Name: Liset Bailon  MRN: [id number]  Patient Class: IP- Inpatient   Admission Date: 7/28/2020  Length of Stay: 3 days  Attending Physician: Solomon Sethi MD  Primary Care Provider: Christine Atkins NP        Subjective:     Principal Problem:Acute anemia        HPI:  Patient getting admitted with acute on chronic resp failure  Patient is on Home O2 and CPAP at night  For past few days she has been experiencing dyspnea on exertion  Later symptoms went worse and she came to Hospital and got admitted  FOBT was positive and pt on NOAC  She denies any juan miguel GI bleed . Hb levels found to be on lower range     Overview/Hospital Course:  07/29  Pt back to baseline  GI Team planning EGD/Colonoscopy on Friday 07/30  No new issues  Says she is tired today  Wants to eat food before GI procedures    07/31  Patient today had EGD which was normal  Because of poor prep colonoscopy was unsuccessful  Pt scheduled for colonoscopy again Tmrw AM  She denies any other issues    Interval History:     Review of Systems   Constitutional: Negative for activity change and appetite change.   HENT: Negative for congestion and dental problem.    Eyes: Negative for discharge and itching.   Respiratory: Negative for shortness of breath.    Cardiovascular: Negative for chest pain.   Gastrointestinal: Negative for abdominal distention and abdominal pain.   Endocrine: Negative for cold intolerance.   Genitourinary: Negative for difficulty urinating and dysuria.   Musculoskeletal: Negative for arthralgias and back pain.   Skin: Negative for color change.   Neurological: Negative for dizziness and facial asymmetry.   Hematological: Negative for adenopathy.   Psychiatric/Behavioral: Negative for agitation and behavioral problems.     Objective:     Vital Signs (Most Recent):  Temp: 97.7 °F (36.5 °C) (07/31/20 1500)  Pulse: 61 (07/31/20 1500)  Resp: 20 (07/31/20 1500)  BP: 127/60 (07/31/20  1500)  SpO2: (!) 93 % (07/31/20 1500) Vital Signs (24h Range):  Temp:  [96.8 °F (36 °C)-98.7 °F (37.1 °C)] 97.7 °F (36.5 °C)  Pulse:  [] 61  Resp:  [17-28] 20  SpO2:  [93 %-100 %] 93 %  BP: (125-176)/(60-82) 127/60     Weight: 126.5 kg (278 lb 14.1 oz)  Body mass index is 51.01 kg/m².    Intake/Output Summary (Last 24 hours) at 7/31/2020 1819  Last data filed at 7/31/2020 0928  Gross per 24 hour   Intake 300 ml   Output --   Net 300 ml      Physical Exam  Vitals signs and nursing note reviewed.   Constitutional:       General: She is not in acute distress.  HENT:      Head: Atraumatic.      Right Ear: External ear normal.      Left Ear: External ear normal.      Nose: Nose normal.      Mouth/Throat:      Mouth: Mucous membranes are moist.   Eyes:      Conjunctiva/sclera: Conjunctivae normal.   Neck:      Musculoskeletal: Neck supple.   Cardiovascular:      Rate and Rhythm: Normal rate.   Pulmonary:      Effort: Pulmonary effort is normal.   Abdominal:      General: There is no distension.   Musculoskeletal: Normal range of motion.   Skin:     General: Skin is warm.   Neurological:      Mental Status: She is alert and oriented to person, place, and time.   Psychiatric:         Behavior: Behavior normal.         Significant Labs:   CBC:   Recent Labs   Lab 07/30/20  0458 07/31/20  0434   WBC 7.60 8.19   HGB 9.0* 8.9*   HCT 31.3* 31.5*    251     CMP:   Recent Labs   Lab 07/30/20  0458 07/31/20  0434    139   K 3.8 4.0   CL 94* 96   CO2 34* 35*   * 133*   BUN 20 20   CREATININE 0.7 0.7   CALCIUM 9.3 8.8   PROT 7.3 7.0   ALBUMIN 3.8 3.7   BILITOT 0.7 0.9   ALKPHOS 79 73   AST 11 12   ALT 13 12   ANIONGAP 11 8   EGFRNONAA >60.0 >60.0       Significant Imaging: I have reviewed all pertinent imaging results/findings within the past 24 hours.      Assessment/Plan:      * Acute anemia  NOAC on Hold  Patient today had EGD which was normal  Because of poor prep colonoscopy was unsuccessful  Pt  scheduled for colonoscopy again Tmrw AM    Paroxysmal atrial fibrillation  Hold NOAC  Continue other management      Respiratory failure with hypoxia and hypercapnia  Acute on chronic resp failure due to acute anemia  Had 3 U pRBC s        Morbid obesity  Need Therapeutic life style changes      Mild intermittent asthma without complication  Stable issue      Obesity hypoventilation syndrome  Along with JASKARAN  On CPAP      Type 2 diabetes mellitus, with long-term current use of insulin  Continue insulin regime        VTE Risk Mitigation (From admission, onward)         Ordered     IP VTE HIGH RISK PATIENT  Once      07/28/20 1118     Place sequential compression device  Until discontinued      07/28/20 1118                      Solomon Sethi MD  Department of Hospital Medicine   [facility]

## 2020-07-31 NOTE — PLAN OF CARE
Problem: Fall Injury Risk  Goal: Absence of Fall and Fall-Related Injury  Outcome: Ongoing, Progressing     Problem: Adult Inpatient Plan of Care  Goal: Plan of Care Review  Outcome: Ongoing, Progressing  Goal: Patient-Specific Goal (Individualization)  Outcome: Ongoing, Progressing  Goal: Absence of Hospital-Acquired Illness or Injury  Outcome: Ongoing, Progressing  Goal: Optimal Comfort and Wellbeing  Outcome: Ongoing, Progressing  Goal: Readiness for Transition of Care  Outcome: Ongoing, Progressing  Goal: Rounds/Family Conference  Outcome: Ongoing, Progressing     Problem: Bariatric Environmental Safety  Goal: Safety Maintained with Care  Outcome: Ongoing, Progressing     Problem: Diabetes Comorbidity  Goal: Blood Glucose Level Within Desired Range  Outcome: Ongoing, Progressing     Problem: Skin Injury Risk Increased  Goal: Skin Health and Integrity  Outcome: Ongoing, Progressing

## 2020-07-31 NOTE — NURSING
Patient refuses the bed alarm after being educated on how they work and the importance of them decreasing the risk of falls. Patient is high risk fall. Fall risk wristband on, nonskid socks on, bed in lowest position, wheels locked, and call light in reach at this time. Will continue to monitor and reeducate patient frequently.

## 2020-07-31 NOTE — [employer] PATIENT INSTRUCTIONS
Discharge Summary/Instructions after an Endoscopic Procedure  Patient Name: Liset Bailon  Patient MRN: [id number]  Patient YOB: 1960 Friday, July 31, 2020  Maicol Ortiz MD  RESTRICTIONS:  During your procedure today, you received medications for sedation.  These   medications may affect your judgment, balance and coordination.  Therefore,   for 24 hours, you have the following restrictions:   - DO NOT drive a car, operate machinery, make legal/financial decisions,   sign important papers or drink alcohol.    ACTIVITY:  Today: no heavy lifting, straining or running due to procedural   sedation/anesthesia.  The following day: return to full activity including work.  DIET:  Eat and drink normally unless instructed otherwise.     TREATMENT FOR COMMON SIDE EFFECTS:  - Mild abdominal pain, nausea, belching, bloating or excessive gas:  rest,   eat lightly and use a heating pad.  - Sore Throat: treat with throat lozenges and/or gargle with warm salt   water.  - Because air was used during the procedure, expelling large amounts of air   from your rectum or belching is normal.  - If a bowel prep was taken, you may not have a bowel movement for 1-3 days.    This is normal.  SYMPTOMS TO WATCH FOR AND REPORT TO YOUR PHYSICIAN:  1. Abdominal pain or bloating, other than gas cramps.  2. Chest pain.  3. Back pain.  4. Signs of infection such as: chills or fever occurring within 24 hours   after the procedure.  5. Rectal bleeding, which would show as bright red, maroon, or black stools.   (A tablespoon of blood from the rectum is not serious, especially if   hemorrhoids are present.)  6. Vomiting.  7. Weakness or dizziness.  GO DIRECTLY TO THE NEAREST EMERGENCY ROOM IF YOU HAVE ANY OF THE FOLLOWING:      Difficulty breathing              Chills and/or fever over 101 F   Persistent vomiting and/or vomiting blood   Severe abdominal pain   Severe chest pain   Black, tarry stools   Bleeding- more than one tablespoon   Any  other symptom or condition that you feel may need urgent attention  Your doctor recommends these additional instructions:  If any biopsies were taken, your doctors clinic will contact you in 1 to 2   weeks with any results.  - Patient has a contact number available for emergencies.  The signs and   symptoms of potential delayed complications were discussed with the   patient.  Return to normal activities tomorrow.  Written discharge   instructions were provided to the patient.   - Return patient to hospital [facility] for ongoing care.  For questions, problems or results please call your physician - Maicol Ortiz MD at Work:  [phone].  [facility], EMERGENCY ROOM PHONE NUMBER: [phone]  IF A COMPLICATION OR EMERGENCY SITUATION ARISES AND YOU ARE UNABLE TO REACH   YOUR PHYSICIAN - GO DIRECTLY TO THE EMERGENCY ROOM.  MD Maicol Coker MD  7/31/2020 9:36:18 AM  This report has been verified and signed electronically.  [employer]

## 2020-08-01 ENCOUNTER — ANESTHESIA (OUTPATIENT)
Dept: SURGERY | Facility: HOSPITAL | Age: 60
DRG: 811 | End: 2020-08-01
Payer: COMMERCIAL

## 2020-08-01 ENCOUNTER — ANESTHESIA EVENT (OUTPATIENT)
Dept: SURGERY | Facility: HOSPITAL | Age: 60
DRG: 811 | End: 2020-08-01
Payer: COMMERCIAL

## 2020-08-01 VITALS
SYSTOLIC BLOOD PRESSURE: 123 MMHG | HEART RATE: 78 BPM | OXYGEN SATURATION: 95 % | BODY MASS INDEX: 49.86 KG/M2 | DIASTOLIC BLOOD PRESSURE: 64 MMHG | WEIGHT: 270.94 LBS | TEMPERATURE: 98 F | HEIGHT: 62 IN | RESPIRATION RATE: 17 BRPM

## 2020-08-01 PROBLEM — J96.92 RESPIRATORY FAILURE WITH HYPOXIA AND HYPERCAPNIA: Status: RESOLVED | Noted: 2019-11-27 | Resolved: 2020-08-01

## 2020-08-01 PROBLEM — J96.91 RESPIRATORY FAILURE WITH HYPOXIA AND HYPERCAPNIA: Status: RESOLVED | Noted: 2019-11-27 | Resolved: 2020-08-01

## 2020-08-01 PROBLEM — R09.02 HYPOXIA: Status: RESOLVED | Noted: 2020-08-01 | Resolved: 2020-08-01

## 2020-08-01 PROBLEM — E11.9 TYPE 2 DIABETES MELLITUS, WITH LONG-TERM CURRENT USE OF INSULIN: Status: RESOLVED | Noted: 2019-08-13 | Resolved: 2020-08-01

## 2020-08-01 PROBLEM — J45.20 MILD INTERMITTENT ASTHMA WITHOUT COMPLICATION: Status: RESOLVED | Noted: 2020-07-28 | Resolved: 2020-08-01

## 2020-08-01 PROBLEM — R09.02 HYPOXIA: Status: ACTIVE | Noted: 2020-08-01

## 2020-08-01 PROBLEM — Z79.4 TYPE 2 DIABETES MELLITUS, WITH LONG-TERM CURRENT USE OF INSULIN: Status: RESOLVED | Noted: 2019-08-13 | Resolved: 2020-08-01

## 2020-08-01 PROBLEM — D64.9 ACUTE ANEMIA: Status: RESOLVED | Noted: 2020-07-28 | Resolved: 2020-08-01

## 2020-08-01 PROBLEM — I48.0 PAROXYSMAL ATRIAL FIBRILLATION: Status: RESOLVED | Noted: 2019-11-27 | Resolved: 2020-08-01

## 2020-08-01 PROBLEM — E66.01 MORBID OBESITY: Status: RESOLVED | Noted: 2017-07-25 | Resolved: 2020-08-01

## 2020-08-01 LAB
ALBUMIN SERPL BCP-MCNC: 3.7 G/DL (ref 3.5–5.2)
ALP SERPL-CCNC: 83 U/L (ref 55–135)
ALT SERPL W/O P-5'-P-CCNC: 14 U/L (ref 10–44)
ANION GAP SERPL CALC-SCNC: 16 MMOL/L (ref 8–16)
AST SERPL-CCNC: 13 U/L (ref 10–40)
BASOPHILS # BLD AUTO: 0.03 K/UL (ref 0–0.2)
BASOPHILS NFR BLD: 0.4 % (ref 0–1.9)
BILIRUB SERPL-MCNC: 0.7 MG/DL (ref 0.1–1)
BUN SERPL-MCNC: 15 MG/DL (ref 6–20)
CALCIUM SERPL-MCNC: 9.3 MG/DL (ref 8.7–10.5)
CHLORIDE SERPL-SCNC: 94 MMOL/L (ref 95–110)
CO2 SERPL-SCNC: 29 MMOL/L (ref 23–29)
CREAT SERPL-MCNC: 0.7 MG/DL (ref 0.5–1.4)
DIFFERENTIAL METHOD: ABNORMAL
EOSINOPHIL # BLD AUTO: 0.2 K/UL (ref 0–0.5)
EOSINOPHIL NFR BLD: 2.8 % (ref 0–8)
ERYTHROCYTE [DISTWIDTH] IN BLOOD BY AUTOMATED COUNT: 20.2 % (ref 11.5–14.5)
EST. GFR  (AFRICAN AMERICAN): >60 ML/MIN/1.73 M^2
EST. GFR  (NON AFRICAN AMERICAN): >60 ML/MIN/1.73 M^2
GLUCOSE SERPL-MCNC: 128 MG/DL (ref 70–110)
GLUCOSE SERPL-MCNC: 139 MG/DL (ref 70–110)
GLUCOSE SERPL-MCNC: 163 MG/DL (ref 70–110)
GLUCOSE SERPL-MCNC: 251 MG/DL (ref 70–110)
HCT VFR BLD AUTO: 32 % (ref 37–48.5)
HGB BLD-MCNC: 9.1 G/DL (ref 12–16)
IMM GRANULOCYTES # BLD AUTO: 0.03 K/UL (ref 0–0.04)
IMM GRANULOCYTES NFR BLD AUTO: 0.4 % (ref 0–0.5)
LYMPHOCYTES # BLD AUTO: 1.4 K/UL (ref 1–4.8)
LYMPHOCYTES NFR BLD: 17.2 % (ref 18–48)
MAGNESIUM SERPL-MCNC: 1.9 MG/DL (ref 1.6–2.6)
MCH RBC QN AUTO: 21.9 PG (ref 27–31)
MCHC RBC AUTO-ENTMCNC: 28.4 G/DL (ref 32–36)
MCV RBC AUTO: 77 FL (ref 82–98)
MONOCYTES # BLD AUTO: 0.5 K/UL (ref 0.3–1)
MONOCYTES NFR BLD: 5.7 % (ref 4–15)
NEUTROPHILS # BLD AUTO: 5.8 K/UL (ref 1.8–7.7)
NEUTROPHILS NFR BLD: 73.5 % (ref 38–73)
NRBC BLD-RTO: 0 /100 WBC
PLATELET # BLD AUTO: 235 K/UL (ref 150–350)
PMV BLD AUTO: 9.3 FL (ref 9.2–12.9)
POTASSIUM SERPL-SCNC: 3.8 MMOL/L (ref 3.5–5.1)
PROT SERPL-MCNC: 7.1 G/DL (ref 6–8.4)
RBC # BLD AUTO: 4.15 M/UL (ref 4–5.4)
SODIUM SERPL-SCNC: 139 MMOL/L (ref 136–145)
WBC # BLD AUTO: 7.9 K/UL (ref 3.9–12.7)

## 2020-08-01 PROCEDURE — 80053 COMPREHEN METABOLIC PANEL: CPT

## 2020-08-01 PROCEDURE — 63600175 PHARM REV CODE 636 W HCPCS: Performed by: NURSE ANESTHETIST, CERTIFIED REGISTERED

## 2020-08-01 PROCEDURE — 94640 AIRWAY INHALATION TREATMENT: CPT

## 2020-08-01 PROCEDURE — 27201089 HC SNARE, DISP (ANY): Performed by: INTERNAL MEDICINE

## 2020-08-01 PROCEDURE — 27000221 HC OXYGEN, UP TO 24 HOURS

## 2020-08-01 PROCEDURE — 45380 COLONOSCOPY AND BIOPSY: CPT | Performed by: INTERNAL MEDICINE

## 2020-08-01 PROCEDURE — 94660 CPAP INITIATION&MGMT: CPT

## 2020-08-01 PROCEDURE — 45385 COLONOSCOPY W/LESION REMOVAL: CPT | Performed by: INTERNAL MEDICINE

## 2020-08-01 PROCEDURE — 85025 COMPLETE CBC W/AUTO DIFF WBC: CPT

## 2020-08-01 PROCEDURE — 36415 COLL VENOUS BLD VENIPUNCTURE: CPT

## 2020-08-01 PROCEDURE — 25000003 PHARM REV CODE 250: Performed by: NURSE ANESTHETIST, CERTIFIED REGISTERED

## 2020-08-01 PROCEDURE — 25000003 PHARM REV CODE 250: Performed by: INTERNAL MEDICINE

## 2020-08-01 PROCEDURE — 27201114 HC TRAP (ANY): Performed by: INTERNAL MEDICINE

## 2020-08-01 PROCEDURE — 63600175 PHARM REV CODE 636 W HCPCS: Performed by: INTERNAL MEDICINE

## 2020-08-01 PROCEDURE — 27200043 HC FORCEPS, BIOPSY: Performed by: INTERNAL MEDICINE

## 2020-08-01 PROCEDURE — 94761 N-INVAS EAR/PLS OXIMETRY MLT: CPT

## 2020-08-01 PROCEDURE — 99900035 HC TECH TIME PER 15 MIN (STAT)

## 2020-08-01 PROCEDURE — 37000009 HC ANESTHESIA EA ADD 15 MINS: Performed by: INTERNAL MEDICINE

## 2020-08-01 PROCEDURE — 63700000 PHARM REV CODE 250 ALT 637 W/O HCPCS: Performed by: INTERNAL MEDICINE

## 2020-08-01 PROCEDURE — 37000008 HC ANESTHESIA 1ST 15 MINUTES: Performed by: INTERNAL MEDICINE

## 2020-08-01 PROCEDURE — 83735 ASSAY OF MAGNESIUM: CPT

## 2020-08-01 PROCEDURE — 27000284 HC CANNULA NASAL: Performed by: ANESTHESIOLOGY

## 2020-08-01 PROCEDURE — 99900031 HC PATIENT EDUCATION (STAT)

## 2020-08-01 RX ORDER — SODIUM CHLORIDE 9 MG/ML
INJECTION, SOLUTION INTRAVENOUS CONTINUOUS PRN
Status: DISCONTINUED | OUTPATIENT
Start: 2020-08-01 | End: 2020-08-01

## 2020-08-01 RX ORDER — PROPOFOL 10 MG/ML
VIAL (ML) INTRAVENOUS
Status: DISCONTINUED | OUTPATIENT
Start: 2020-08-01 | End: 2020-08-01

## 2020-08-01 RX ORDER — FUROSEMIDE 10 MG/ML
20 INJECTION INTRAMUSCULAR; INTRAVENOUS ONCE
Status: COMPLETED | OUTPATIENT
Start: 2020-08-01 | End: 2020-08-01

## 2020-08-01 RX ORDER — FERROUS SULFATE 325(65) MG
325 TABLET ORAL 2 TIMES DAILY
Qty: 30 TABLET | Refills: 1 | Status: SHIPPED | OUTPATIENT
Start: 2020-08-01 | End: 2020-08-31

## 2020-08-01 RX ADMIN — INSULIN ASPART 10 UNITS: 100 INJECTION, SOLUTION INTRAVENOUS; SUBCUTANEOUS at 05:08

## 2020-08-01 RX ADMIN — PROPOFOL 500 MG: 10 INJECTION, EMULSION INTRAVENOUS at 02:08

## 2020-08-01 RX ADMIN — FUROSEMIDE 20 MG: 10 INJECTION, SOLUTION INTRAMUSCULAR; INTRAVENOUS at 03:08

## 2020-08-01 RX ADMIN — POTASSIUM CHLORIDE 40 MEQ: 20 SOLUTION ORAL at 06:08

## 2020-08-01 RX ADMIN — INSULIN ASPART 6 UNITS: 100 INJECTION, SOLUTION INTRAVENOUS; SUBCUTANEOUS at 05:08

## 2020-08-01 RX ADMIN — HYDROCHLOROTHIAZIDE 25 MG: 25 TABLET ORAL at 03:08

## 2020-08-01 RX ADMIN — LEVOTHYROXINE SODIUM 25 MCG: 25 TABLET ORAL at 06:08

## 2020-08-01 RX ADMIN — FLUTICASONE FUROATE AND VILANTEROL TRIFENATATE 1 PUFF: 100; 25 POWDER RESPIRATORY (INHALATION) at 08:08

## 2020-08-01 RX ADMIN — DILTIAZEM HYDROCHLORIDE 180 MG: 180 CAPSULE, COATED, EXTENDED RELEASE ORAL at 03:08

## 2020-08-01 RX ADMIN — PANTOPRAZOLE SODIUM 40 MG: 40 TABLET, DELAYED RELEASE ORAL at 06:08

## 2020-08-01 RX ADMIN — SODIUM CHLORIDE: 0.9 INJECTION, SOLUTION INTRAVENOUS at 01:08

## 2020-08-01 NOTE — [employer] PATIENT INSTRUCTIONS
Discharge Summary/Instructions after an Endoscopic Procedure  Patient Name: Liset Bailon  Patient MRN: [id number]  Patient YOB: 1960 Saturday, August 1, 2020  MARIA EUGENIA Barone MD  RESTRICTIONS:  During your procedure today, you received medications for sedation.  These   medications may affect your judgment, balance and coordination.  Therefore,   for 24 hours, you have the following restrictions:   - DO NOT drive a car, operate machinery, make legal/financial decisions,   sign important papers or drink alcohol.    ACTIVITY:  Today: no heavy lifting, straining or running due to procedural   sedation/anesthesia.  The following day: return to full activity including work.  DIET:  Eat and drink normally unless instructed otherwise.     TREATMENT FOR COMMON SIDE EFFECTS:  - Mild abdominal pain, nausea, belching, bloating or excessive gas:  rest,   eat lightly and use a heating pad.  - Sore Throat: treat with throat lozenges and/or gargle with warm salt   water.  - Because air was used during the procedure, expelling large amounts of air   from your rectum or belching is normal.  - If a bowel prep was taken, you may not have a bowel movement for 1-3 days.    This is normal.  SYMPTOMS TO WATCH FOR AND REPORT TO YOUR PHYSICIAN:  1. Abdominal pain or bloating, other than gas cramps.  2. Chest pain.  3. Back pain.  4. Signs of infection such as: chills or fever occurring within 24 hours   after the procedure.  5. Rectal bleeding, which would show as bright red, maroon, or black stools.   (A tablespoon of blood from the rectum is not serious, especially if   hemorrhoids are present.)  6. Vomiting.  7. Weakness or dizziness.  GO DIRECTLY TO THE NEAREST EMERGENCY ROOM IF YOU HAVE ANY OF THE FOLLOWING:      Difficulty breathing              Chills and/or fever over 101 F   Persistent vomiting and/or vomiting blood   Severe abdominal pain   Severe chest pain   Black, tarry stools   Bleeding- more than one  tablespoon   Any other symptom or condition that you feel may need urgent attention  Your doctor recommends these additional instructions:  If any biopsies were taken, your doctors clinic will contact you in 1 to 2   weeks with any results.  - Await pathology results.   - Repeat colonoscopy in 3 years for surveillance.   - Avoid NSAIDs for 2 weeks. OK to take aspirin 81mg daily for cardiovascular   purposes  - Resume eliquis in 3 days  - Return patient to hospital [facility] for ongoing care.  For questions, problems or results please call your physician - MARIA EUGENIA Barone MD at Work:  [phone].  [facility], EMERGENCY ROOM PHONE NUMBER: [phone]  IF A COMPLICATION OR EMERGENCY SITUATION ARISES AND YOU ARE UNABLE TO REACH   YOUR PHYSICIAN - GO DIRECTLY TO THE EMERGENCY ROOM.  MARIA EUGENIA Barone MD  8/1/2020 2:27:04 PM  This report has been verified and signed electronically.  [employer]

## 2020-08-01 NOTE — ANESTHESIA POSTPROCEDURE EVALUATION
Anesthesia Post Evaluation    Patient: Liset Bailon    Procedure(s) Performed: Procedure(s) (LRB):  COLONOSCOPY (Left)    Final Anesthesia Type: MAC    Patient location during evaluation: GI PACU  Patient participation: Yes- Able to Participate  Level of consciousness: awake and alert  Post-procedure vital signs: reviewed and stable  Pain management: adequate  Airway patency: patent    PONV status at discharge: No PONV  Anesthetic complications: no      Cardiovascular status: stable  Respiratory status: unassisted  Hydration status: euvolemic  Follow-up not needed.      VSS with O2 per NC    No case tracking events are documented in the log.      Pain/Sierra Score: Pain Rating Prior to Med Admin: 7 (7/31/2020 10:33 AM)

## 2020-08-01 NOTE — ANESTHESIA PREPROCEDURE EVALUATION
2020  Liset Bailon is a 59 y.o., female.      Patient Active Problem List   Diagnosis    Obstructive sleep apnea syndrome    Never smoker    Morbid obesity    Type 2 diabetes mellitus, with long-term current use of insulin    Dyspnea    Respiratory failure with hypoxia and hypercapnia    Paroxysmal atrial fibrillation    Obesity hypoventilation syndrome    Acute anemia    Acute on chronic anemia    Mild intermittent asthma without complication       Past Surgical History:   Procedure Laterality Date    APPENDECTOMY       SECTION      x3    HYSTERECTOMY      OOPHORECTOMY Bilateral     age 42        Tobacco Use:  The patient  reports that she has never smoked. She has never used smokeless tobacco.     Results for orders placed or performed during the hospital encounter of 20   EKG 12-lead    Collection Time: 20  9:39 AM    Narrative    Test Reason : R06.02,    Vent. Rate : 087 BPM     Atrial Rate : 087 BPM     P-R Int : 164 ms          QRS Dur : 096 ms      QT Int : 364 ms       P-R-T Axes : 023 -44 079 degrees     QTc Int : 438 ms    Normal sinus rhythm  Left axis deviation  LVH with repolarization abnormality  Abnormal ECG  When compared with ECG of 2019 12:33,  Sinus rhythm has replaced Atrial fibrillation  Questionable change in QRS duration  Confirmed by Burke SOLIMAN, Cuco MADDEN ([id number]) on 2020 9:14:43 PM    Referred By:  FRACISCO           Confirmed By:Cuco Turk MD             Lab Results   Component Value Date    WBC 7.90 2020    HGB 9.1 (L) 2020    HCT 32.0 (L) 2020    MCV 77 (L) 2020     2020     BMP  Lab Results   Component Value Date     2020    K 3.8 2020    CL 94 (L) 2020    CO2 29 2020    BUN 15 2020    CREATININE 0.7 2020    CALCIUM 9.3 2020    ANIONGAP 16  08/01/2020     (H) 08/01/2020     (H) 07/31/2020     (H) 07/30/2020       Results for orders placed during the hospital encounter of 07/28/20   Echo Color Flow Doppler? Yes    Narrative · Normal left ventricular systolic function. The estimated ejection   fraction is 55%.  · Normal LV diastolic function.  · No wall motion abnormalities.  · Normal right ventricular systolic function.  · Mild tricuspid regurgitation.  · No pulmonary hypertension present.  · Normal central venous pressure (3 mmHg).     Morphologic left ventricle appears to contract normally in is not dilated  Aortic valve is not well recorded a suggestion of very mild aortic valve   stenosis at most is noted by Doppler  Of poor acoustic window.  Definity contrast was not utilized to visualize   left ventricle               Pre-op Assessment    I have reviewed the Patient Summary Reports.     I have reviewed the Nursing Notes. I have reviewed the NPO Status.   I have reviewed the Medications.     Review of Systems  Anesthesia Hx:  Denies Family Hx of Anesthesia complications.   Denies Personal Hx of Anesthesia complications.   Social:  Non-Smoker    Hematology/Oncology:     Oncology Normal    -- Anemia:   EENT/Dental:EENT/Dental Normal   Cardiovascular:   Exercise tolerance: poor Hypertension Dysrhythmias (paroxysmal) atrial fibrillation ECG has been reviewed.    Pulmonary:   Asthma asymptomatic and mild Shortness of breath (2L NC at night ) Sleep Apnea, CPAP    Education provided regarding risk of obstructive sleep apnea     Renal/:  Renal/ Normal     Hepatic/GI:  Hepatic/GI Normal    Musculoskeletal:  Musculoskeletal Normal    Neurological:  Neurology Normal    Endocrine:   Diabetes    Psych:  Psychiatric Normal           Physical Exam  General:  Well nourished, Morbid Obesity    Airway/Jaw/Neck:  Airway Findings: Mouth Opening: Normal Tongue: Normal  General Airway Assessment: Adult  Mallampati: II  TM Distance: Normal, at  least 6 cm  Jaw/Neck Findings:  Neck ROM: Normal ROM      Dental:  Dental Findings: Upper partial dentures   Chest/Lungs:  Chest/Lungs Findings: Clear to auscultation, Normal Respiratory Rate     Heart/Vascular:  Heart Findings: Rate: Normal  Rhythm: Regular Rhythm  Sounds: Normal        Mental Status:  Mental Status Findings:  Alert and Oriented         Anesthesia Plan  Type of Anesthesia, risks & benefits discussed:  Anesthesia Type:  MAC  Patient's Preference:   Intra-op Monitoring Plan: standard ASA monitors  Intra-op Monitoring Plan Comments:   Post Op Pain Control Plan:   Post Op Pain Control Plan Comments:   Induction:    Beta Blocker:         Informed Consent: Patient understands risks and agrees with Anesthesia plan.  Questions answered. Anesthesia consent signed with patient.  ASA Score: 3     Day of Surgery Review of History & Physical:        Anesthesia Plan Notes: Propofol        Ready For Surgery From Anesthesia Perspective.

## 2020-08-01 NOTE — TRANSFER OF CARE
"Anesthesia Transfer of Care Note    Patient: Liset Bailon    Procedure(s) Performed: Procedure(s) (LRB):  COLONOSCOPY (Left)    Patient location: PACU    Anesthesia Type: MAC    Transport from OR: Transported from OR on 2-3 L/min O2 by NC with adequate spontaneous ventilation    Post pain: adequate analgesia    Post assessment: no apparent anesthetic complications    Post vital signs: stable    Level of consciousness: awake    Nausea/Vomiting: no nausea/vomiting    Complications: none    Transfer of care protocol was followed      Last vitals:   Visit Vitals  BP (!) 180/92   Pulse 75   Temp 36.7 °C (98.1 °F)   Resp (!) 23   Ht 5' 2" (1.575 m)   Wt 122.9 kg (270 lb 15.1 oz)   LMP  (LMP Unknown)   SpO2 99%   Breastfeeding No   BMI 49.56 kg/m²     "

## 2020-08-01 NOTE — NURSING
Pt verbalized understanding of discharge education and instructions, made aware of prescription sent to pharmacy. Wheeled off unit with no signs of distress.

## 2020-08-01 NOTE — CARE UPDATE
08/01/20 0824   Patient Assessment/Suction   Level of Consciousness (AVPU) alert   Respiratory Effort Normal;Unlabored   Expansion/Accessory Muscles/Retractions no use of accessory muscles   All Lung Fields Breath Sounds diminished   PRE-TX-O2   O2 Device (Oxygen Therapy) nasal cannula   $ Is the patient on Low Flow Oxygen? Yes   Flow (L/min) 2   SpO2 97 %   Pulse Oximetry Type Intermittent   $ Pulse Oximetry - Multiple Charge Pulse Oximetry - Multiple   Pulse 94   Resp 20   Positioning Sitting in chair   Aerosol Therapy   $ Aerosol Therapy Charges PRN treatment not required   Inhaler   $ Inhaler Charges MDI (Metered Dose Inahler) Treatment   Daily Review of Necessity (Inhaler) completed   Respiratory Treatment Status (Inhaler) given;mouth rinsed post treatment   Treatment Route (Inhaler) mouthpiece   Patient Position (Inhaler) sitting in chair   Post Treatment Assessment (Inhaler) breath sounds unchanged;vital signs unchanged   Signs of Intolerance (Inhaler) none   Preset CPAP/BiPAP Settings   Mode Of Delivery Standby   $ CPAP/BiPAP Daily Charge BiPAP/CPAP Daily   $ Initial CPAP/BiPAP Setup? No   $ Is patient using? Yes   Equipment Type DeVilbiss   Education   $ Education Bronchodilator;15 min   Respiratory Evaluation   $ Care Plan Tech Time 15 min

## 2020-08-01 NOTE — PLAN OF CARE
07/31/20 2001   Patient Assessment/Suction   Level of Consciousness (AVPU) alert   Respiratory Effort Normal;Unlabored   Expansion/Accessory Muscles/Retractions no use of accessory muscles   All Lung Fields Breath Sounds   (decreased on the left)   PRE-TX-O2   O2 Device (Oxygen Therapy) nasal cannula   Flow (L/min) 2   SpO2 98 %   Pulse Oximetry Type Intermittent   $ Pulse Oximetry - Multiple Charge Pulse Oximetry - Multiple   Pulse 75   Resp 18   Aerosol Therapy   $ Aerosol Therapy Charges PRN treatment not required   Daily Review of Necessity (SVN) completed   Respiratory Treatment Status (SVN) PRN treatment not required   continue tx. As ordered/pt is compliant with cpap

## 2020-08-02 NOTE — DISCHARGE SUMMARY
[facility] Medicine  Discharge Summary      Patient Name: Liset Bailon  MRN: [id number]  Admission Date: 7/28/2020  Hospital Length of Stay: 4 days  Discharge Date and Time:  08/01/2020 9:01 PM  Attending Physician: No att. providers found   Discharging Provider: Solomon Sethi MD  Primary Care Provider: Christine Atkins NP      HPI:   Patient getting admitted with acute on chronic resp failure  Patient is on Home O2 and CPAP at night  For past few days she has been experiencing dyspnea on exertion  Later symptoms went worse and she came to Hospital and got admitted  FOBT was positive and pt on NOAC  She denies any juan miguel GI bleed . Hb levels found to be on lower range     Procedure(s) (LRB):  COLONOSCOPY (Left)      Hospital Course:   Patient admitted with acute on chronic respiratory failure and was found to be anemic  Patient had EGD which was normal  She had 3 units of pRBC transfusion  Colonoscopy showed lipoma and pedunculated polyp (awaiting biopsy results)  Later pt was discharged to home  She will see Pulmonology and GI MD as an OP        Consults:   Consults (From admission, onward)        Status Ordering Provider     Inpatient consult to Gastroenterology  Once     Provider:  Jewel Howard III, MD    Completed SOLOMON SETHI     Inpatient consult to Pulmonology  Once     Provider:  Skye Frey MD    Completed SOLOMON SETHI          No new Assessment & Plan notes have been filed under this hospital service since the last note was generated.  Service: Hospital Medicine    Final Active Diagnoses:      Problems Resolved During this Admission:    Diagnosis Date Noted Date Resolved POA    PRINCIPAL PROBLEM:  Acute anemia [D64.9] 07/28/2020 08/01/2020 Unknown    Respiratory failure with hypoxia and hypercapnia [J96.91, J96.92] 11/27/2019 08/01/2020 Yes    Paroxysmal atrial fibrillation [I48.0] 11/27/2019 08/01/2020 Yes    Morbid obesity [E66.01] 07/25/2017 08/01/2020 Yes    Hypoxia  [R09.02] 08/01/2020 08/01/2020 Yes    Mild intermittent asthma without complication [J45.20] 07/28/2020 08/01/2020 Yes    Obesity hypoventilation syndrome [E66.2]  08/01/2020 Yes    Type 2 diabetes mellitus, with long-term current use of insulin [E11.9, Z79.4] 08/13/2019 08/01/2020 Not Applicable       Discharged Condition: good    Disposition: Home or Self Care    Follow Up:  Follow-up Information     Maicol Ortiz MD.    Specialty: Gastroenterology  Contact information:  [address] [facility] [address]  [phone]                 Patient Instructions:      Diet diabetic     Activity as tolerated       Significant Diagnostic Studies: Labs:   CMP   Recent Labs   Lab 07/31/20  0434 08/01/20  0517    139   K 4.0 3.8   CL 96 94*   CO2 35* 29   * 128*   BUN 20 15   CREATININE 0.7 0.7   CALCIUM 8.8 9.3   PROT 7.0 7.1   ALBUMIN 3.7 3.7   BILITOT 0.9 0.7   ALKPHOS 73 83   AST 12 13   ALT 12 14   ANIONGAP 8 16   ESTGFRAFRICA >60.0 >60.0   EGFRNONAA >60.0 >60.0    and CBC   Recent Labs   Lab 07/31/20  0434 08/01/20  0517   WBC 8.19 7.90   HGB 8.9* 9.1*   HCT 31.5* 32.0*    235       Pending Diagnostic Studies:     Procedure Component Value Units Date/Time    Specimen to Pathology - Surgery [[id number]] Collected: 08/01/20 1413    Order Status: Sent Lab Status: No result     Specimen: Tissue          Medications:  Reconciled Home Medications:      Medication List      START taking these medications    ferrous sulfate 325 mg (65 mg iron) Tab tablet  Commonly known as: FEOSOL  Take 1 tablet (325 mg total) by mouth 2 (two) times daily.        CONTINUE taking these medications    albuterol 90 mcg/actuation inhaler  Commonly known as: PROAIR HFA  Inhale 2 puffs into the lungs every 6 (six) hours as needed for Wheezing. Rescue     apixaban 5 mg Tab  Commonly known as: ELIQUIS  Take 1 tablet (5 mg total) by mouth 2 (two) times daily.     ASPIR-81 ORAL  Take 1 tablet by mouth once daily.     atorvastatin  "20 MG tablet  Commonly known as: LIPITOR  Take 20 mg by mouth once daily.     BASAGLAR KWIKPEN U-100 INSULIN glargine 100 units/mL (3mL) SubQ pen  Generic drug: insulin  Inject 35 Units into the skin once daily.     * BD INSULIN SYRINGE ULTRA-FINE 0.5 mL 31 gauge x 5/16" Syrg  Generic drug: insulin syringe-needle U-100  BD Insulin Syringe Ultra-Fine 0.5 mL 31 gauge x 5/16"     * BD INSULIN SYRINGE ULTRA-FINE 0.5 mL 31 gauge x 5/16" Syrg  Generic drug: insulin syringe-needle U-100  USE TO INJECT 4X DAILY     BD ULTRA-FINE SUSHANT PEN NEEDLE 32 gauge x 5/32" Ndle  Generic drug: pen needle, diabetic  BD Ultra-Fine Sushant Pen Needle 32 gauge x 5/32"     cholecalciferol (vitamin D3) 1,250 mcg (50,000 unit) capsule  Take 1 each by mouth twice a week. Monday and Tuesdays     diltiaZEM 120 MG Cp24  Commonly known as: CARDIZEM CD  Take 1 capsule (120 mg total) by mouth once daily.     fluticasone-salmeterol 250-50 mcg/dose 250-50 mcg/dose diskus inhaler  Commonly known as: ADVAIR DISKUS  INHALE ONE PUFF INTO THE LUNGS TWICE DAILY     * FREESTYLE LITE STRIPS MISC  FreeStyle Lite Strips   USE UTD QID     * FREESTYLE INSULINX TEST STRIPS Strp  Generic drug: blood sugar diagnostic  TEST BLOOD SUGAR QID     * FREESTYLE LANCETS 28 gauge Misc  Generic drug: lancets  FreeStyle Lancets 28 gauge     * FREESTYLE LANCETS 28 gauge Misc  Generic drug: lancets  TEST QID     gabapentin 300 MG capsule  Commonly known as: NEURONTIN  Take 600 mg by mouth 2 (two) times daily.     GEL-ONE 30 mg/3 mL  Generic drug: hyaluronate sod, cross-linked  3 mLs.     glimepiride 4 MG tablet  Commonly known as: AMARYL  Take 4 mg by mouth before breakfast.     hydroCHLOROthiazide 25 MG tablet  Commonly known as: HYDRODIURIL  Take 25 mg by mouth once daily.     irbesartan 300 MG tablet  Commonly known as: AVAPRO  Take 300 mg by mouth once daily.     levalbuterol 0.63 mg/3 mL nebulizer solution  Commonly known as: XOPENEX  Take 1 ampule by nebulization 3 (three) " times daily as needed for Wheezing. Rescue     levothyroxine 25 MCG tablet  Commonly known as: SYNTHROID  Take 25 mcg by mouth once daily.     magnesium oxide 400 mg (241.3 mg magnesium) tablet  Commonly known as: MAG-OX  Take 400 mg by mouth once daily.     metFORMIN 500 MG tablet  Commonly known as: GLUCOPHAGE  Take 500 mg by mouth 2 (two) times daily with meals.     NovoLOG Flexpen U-100 Insulin 100 unit/mL (3 mL) Inpn pen  Generic drug: insulin aspart U-100  Inject 25 Units into the skin 3 (three) times daily. Pt states sliding scale     omeprazole 20 MG capsule  Commonly known as: PRILOSEC  Take 20 mg by mouth once daily.     ORTHOVISC 30 mg/2 mL Syrg  Generic drug: sodium hyaluronate (orthovisc)  ORTHOVISC 30 mg/2 mL intra-articular syringe     SYNVISC-ONE 48 mg/6 mL Syrg  Generic drug: hylan g-f 20  6 mLs.     VICTOZA 3-KAM 0.6 mg/0.1 mL (18 mg/3 mL) Pnij pen  Generic drug: liraglutide 0.6 mg/0.1 mL (18 mg/3 mL) subq PNIJ  Inject 1.8 mg into the skin once daily.         * This list has 6 medication(s) that are the same as other medications prescribed for you. Read the directions carefully, and ask your doctor or other care provider to review them with you.                Indwelling Lines/Drains at time of discharge:   Lines/Drains/Airways     None                 Time spent on the discharge of patient: 40  minutes  Patient was seen and examined on the date of discharge and determined to be suitable for discharge.         Solomon Sethi MD  Department of Hospital Medicine  [facility]

## 2020-08-20 ENCOUNTER — HOSPITAL ENCOUNTER (OUTPATIENT)
Dept: PULMONOLOGY | Facility: HOSPITAL | Age: 60
Discharge: HOME | End: 2020-08-20
Attending: INTERNAL MEDICINE
Payer: COMMERCIAL

## 2020-08-20 VITALS — HEIGHT: 62 IN | WEIGHT: 270 LBS | BODY MASS INDEX: 49.69 KG/M2

## 2020-08-20 DIAGNOSIS — J44.9 CHRONIC OBSTRUCTIVE PULMONARY DISEASE, UNSPECIFIED COPD TYPE: ICD-10-CM

## 2020-08-20 PROCEDURE — 94618 PULMONARY STRESS TESTING: CPT

## 2020-08-20 PROCEDURE — 99900035 HC TECH TIME PER 15 MIN (STAT)

## 2020-08-20 NOTE — CARE UPDATE
"   08/20/20 1404   6MW Test   Ordering Provider Dr. Percy Guzmán   Diagnosis Qualify for Oxygen   Height 5' 2" (1.575 m)   Weight 122.5 kg (270 lb)   BMI (Calculated) 49.4   Predicted Distance 231.6   Patient Race    6MWT Status completed without stopping   Patient Reported Dyspnea   Was O2 used? Yes   Delivery Method Cannula;Pull Tank;Continuous Flow   6MW Distance walked (feet) 1000 feet   Distance walked (meters) 304.8 meters   Did patient stop? No   Type of assistive device(s) used? a walker   Is extra documentation required for this patient? Yes   Pre-Exercise   Oxygen Saturation 87 %   Supplemental Oxygen Room Air   Heart Rate 104 bpm   Alexis Dyspnea Rating  moderate   Exercise 1 Minute   Oxygen Saturation 88 %   Supplemental Oxygen 2 L/M   Heart Rate 104 bpm   Exercise 2 Minutes   Oxygen Saturation 91 %   Supplemental Oxygen 2 L/M   Heart Rate 119 bpm   Exercise 3 Minutes   Oxygen Saturation 96 %   Supplemental Oxygen 2 L/M   Heart Rate 108 bpm   Exercise 4 Minutes   Oxygen Saturation 95 %   Supplemental Oxygen 2 L/M   Heart Rate 100 bpm   Exercise 5 Minutes   Oxygen Saturation 95 %   Supplemental Oxygen 2 L/M   Heart Rate 101 bpm   Post Exercise   Oxygen Saturation 97 %   Supplemental Oxygen 2 L/M   Heart Rate 91 bpm   Alexis Dyspnea Rating  moderate   Recovery   Oxygen Saturation 96 %   Supplemental Oxygen 2 L/M   Heart Rate 88 bpm   Alexis Dyspnea Rating  moderate   Interpretation   Is procedure ready for interpretation? Yes   Did the patient stop or pause? No   Total Time Walked (Calculated) 360 seconds   Total Laps Walked 5   Final Partial Lap Distance (feet) 0 feet   Total Distance Feet (Calculated) 1000 feet   Total Distance Meters (Calculated) 304.8 meters   Predicted Distance Meters (Calculated) 383.63 meters   Percentage of Predicted (Calculated) 79.45   Peak VO2 (Calculated) 13.12   Mets 3.75   Comments This is a hypoxemic 6 min. walk. Patient requires 2lpm of supplemental oxygen with " ambulation   Oxygen Qualification   Oxygen Qualification? Yes

## 2020-09-02 ENCOUNTER — TELEPHONE (OUTPATIENT)
Dept: PULMONOLOGY | Facility: HOSPITAL | Age: 60
End: 2020-09-02

## 2020-09-02 DIAGNOSIS — J44.9 CHRONIC OBSTRUCTIVE PULMONARY DISEASE, UNSPECIFIED COPD TYPE: Primary | ICD-10-CM

## 2020-10-06 ENCOUNTER — OFFICE VISIT (OUTPATIENT)
Dept: OBSTETRICS AND GYNECOLOGY | Facility: CLINIC | Age: 60
End: 2020-10-06
Payer: COMMERCIAL

## 2020-10-06 VITALS
BODY MASS INDEX: 48.26 KG/M2 | DIASTOLIC BLOOD PRESSURE: 78 MMHG | SYSTOLIC BLOOD PRESSURE: 130 MMHG | HEIGHT: 62 IN | WEIGHT: 262.25 LBS

## 2020-10-06 DIAGNOSIS — L40.9 PSORIASIS: ICD-10-CM

## 2020-10-06 DIAGNOSIS — Z12.31 ENCOUNTER FOR SCREENING MAMMOGRAM FOR MALIGNANT NEOPLASM OF BREAST: ICD-10-CM

## 2020-10-06 DIAGNOSIS — Z01.419 ENCOUNTER FOR ANNUAL ROUTINE GYNECOLOGICAL EXAMINATION: Primary | ICD-10-CM

## 2020-10-06 PROCEDURE — 99396 PR PREVENTIVE VISIT,EST,40-64: ICD-10-PCS | Mod: S$GLB,,, | Performed by: OBSTETRICS & GYNECOLOGY

## 2020-10-06 PROCEDURE — 99999 PR PBB SHADOW E&M-EST. PATIENT-LVL III: CPT | Mod: PBBFAC,,, | Performed by: OBSTETRICS & GYNECOLOGY

## 2020-10-06 PROCEDURE — 99999 PR PBB SHADOW E&M-EST. PATIENT-LVL III: ICD-10-PCS | Mod: PBBFAC,,, | Performed by: OBSTETRICS & GYNECOLOGY

## 2020-10-06 PROCEDURE — 99396 PREV VISIT EST AGE 40-64: CPT | Mod: S$GLB,,, | Performed by: OBSTETRICS & GYNECOLOGY

## 2020-10-06 RX ORDER — FERROUS SULFATE 325(65) MG
TABLET ORAL
COMMUNITY
Start: 2020-09-15

## 2020-10-06 RX ORDER — ERGOCALCIFEROL 1.25 MG/1
CAPSULE ORAL
COMMUNITY
Start: 2020-09-29

## 2020-10-06 RX ORDER — EMPAGLIFLOZIN 10 MG/1
TABLET, FILM COATED ORAL
COMMUNITY
Start: 2020-09-25

## 2020-10-06 NOTE — PROGRESS NOTES
"Chief Complaint: Well Woman Exam     HPI:      Liset Bailon is a 60 y.o.  who presents today for well woman exam.  LMP: No LMP recorded (lmp unknown). Patient has had a hysterectomy.  No issues, problems, or complaints. Specifically, patient denies abnormal vaginal bleeding, discharge, pelvic pain, urinary problems, or changes in appetite. Ms. Bailon is currently sexually active with a single male partner. She declines STD screening today.    Previous Pap:  Hysterectomy for benign indications, has never had an abnormal pap  Previous Mammogram: BiRads: 1 T-C Score: 7.67% (1/15/2019)    Colonoscopy:  20: High grade polyp    Ms. Bailon confirms that she wears her seatbelt when riding in the car and does not text while driving.     OB History        4    Para   3    Term   3            AB   1    Living   3       SAB   1    TAB        Ectopic        Multiple        Live Births   3                 ROS:     GENERAL: Denies unintentional weight gain or weight loss. Feeling well overall.   SKIN: Denies rash or lesions.   HEENT: Denies headaches, or vision changes.   CARDIOVASCULAR: Denies palpitations or chest pain.   RESPIRATORY: Denies shortness of breath or dyspnea on exertion.  BREASTS: Denies pain, lumps, or nipple discharge.   ABDOMEN: Denies abdominal pain, constipation, diarrhea, nausea, vomiting, change in appetite.  URINARY: Denies frequency, dysuria, hematuria.  NEUROLOGIC: Denies syncope or weakness.   PSYCHIATRIC: Denies depression, anxiety or mood swings.    Physical Exam:      PHYSICAL EXAM:  /78   Ht 5' 2" (1.575 m)   Wt 118.9 kg (262 lb 3.8 oz)   LMP  (LMP Unknown)   BMI 47.96 kg/m²   Body mass index is 47.96 kg/m².     APPEARANCE: Well nourished, well developed, in no acute distress.  PSYCH: Appropriate mood and affect.  SKIN: No acne or hirsutism. Severe psoriasis on bilateral elbows and on chest.   NECK: Neck symmetric without masses or thyromegaly  NODES: No " inguinal, axillary, or supraclavicular lymph node enlargement  ABDOMEN: Soft.  No tenderness or masses.    CARDIOVASCULAR: No edema of peripheral extremities  BREASTS: Symmetrical, no skin changes or visible lesions.  No palpable masses or nipple discharge bilaterally.  PELVIC: Normal external genitalia without lesions.  Normal hair distribution.  Adequate perineal body, normal urethral meatus.  Vagina moist and smooth without lesions or discharge. Normal appearing vaginal cuff. No significant cystocele or rectocele.  Bimanual exam shows no midline or adnexal masses or tenderness.      Assessment/Plan:     Encounter for annual routine gynecological examination    Encounter for screening mammogram for malignant neoplasm of breast  -     Cancel: Mammo Digital Screening Bilat; Future; Expected date: 10/06/2020    Psoriasis  -     clobetasoL (TEMOVATE) 0.05 % cream; Apply topically 2 (two) times daily.  Dispense: 60 g; Refill: 3  -     Pt urged to go see her dermatologist for further treatment of severe psoriasis.    Follow up in about 1 year (around 10/6/2021) for Annual Exam.    Counseling:     Patient was counseled today on current ASCCP pap guidelines, the recommendation for yearly pelvic exams, healthy diet and exercise routines, breast self awareness and annual mammograms.She is to see her PCP for other health maintenance.     Use of the [employer] Patient Portal discussed and encouraged during today's visit.

## 2020-10-08 ENCOUNTER — HOSPITAL ENCOUNTER (OUTPATIENT)
Dept: RADIOLOGY | Facility: OTHER | Age: 60
Discharge: HOME | End: 2020-10-08
Attending: OBSTETRICS & GYNECOLOGY
Payer: COMMERCIAL

## 2020-10-08 DIAGNOSIS — Z12.31 ENCOUNTER FOR SCREENING MAMMOGRAM FOR MALIGNANT NEOPLASM OF BREAST: ICD-10-CM

## 2020-10-08 PROCEDURE — 77063 MAMMO DIGITAL SCREENING BILAT WITH TOMO: ICD-10-PCS | Mod: 26,,, | Performed by: RADIOLOGY

## 2020-10-08 PROCEDURE — 77063 BREAST TOMOSYNTHESIS BI: CPT | Mod: 26,,, | Performed by: RADIOLOGY

## 2020-10-08 PROCEDURE — 77067 SCR MAMMO BI INCL CAD: CPT | Mod: TC

## 2020-10-08 PROCEDURE — 77067 MAMMO DIGITAL SCREENING BILAT WITH TOMO: ICD-10-PCS | Mod: 26,,, | Performed by: RADIOLOGY

## 2020-10-08 PROCEDURE — 77067 SCR MAMMO BI INCL CAD: CPT | Mod: 26,,, | Performed by: RADIOLOGY

## 2020-10-09 RX ORDER — CLOBETASOL PROPIONATE 0.5 MG/G
CREAM TOPICAL 2 TIMES DAILY
Qty: 60 G | Refills: 3 | Status: SHIPPED | OUTPATIENT
Start: 2020-10-09 | End: 2023-01-13

## 2020-10-22 ENCOUNTER — TELEPHONE (OUTPATIENT)
Dept: PULMONOLOGY | Facility: CLINIC | Age: 60
End: 2020-10-22

## 2020-10-22 NOTE — TELEPHONE ENCOUNTER
Patient called stating she needs to reschedule her appointment due to an issue that came up. If you will please call the pt to reschedule , thanks!!

## 2021-03-18 ENCOUNTER — OFFICE VISIT (OUTPATIENT)
Dept: PULMONOLOGY | Facility: CLINIC | Age: 61
End: 2021-03-18
Payer: COMMERCIAL

## 2021-03-18 VITALS
SYSTOLIC BLOOD PRESSURE: 116 MMHG | TEMPERATURE: 98 F | BODY MASS INDEX: 52.13 KG/M2 | HEART RATE: 99 BPM | DIASTOLIC BLOOD PRESSURE: 82 MMHG | OXYGEN SATURATION: 92 % | WEIGHT: 285 LBS

## 2021-03-18 DIAGNOSIS — Z78.9 NONSMOKER: ICD-10-CM

## 2021-03-18 DIAGNOSIS — G47.33 OBSTRUCTIVE SLEEP APNEA SYNDROME: Primary | ICD-10-CM

## 2021-03-18 DIAGNOSIS — D64.9 ACUTE ON CHRONIC ANEMIA: ICD-10-CM

## 2021-03-18 DIAGNOSIS — R06.00 DYSPNEA, UNSPECIFIED TYPE: ICD-10-CM

## 2021-03-18 PROCEDURE — 99214 OFFICE O/P EST MOD 30 MIN: CPT | Mod: S$GLB,,, | Performed by: INTERNAL MEDICINE

## 2021-03-18 PROCEDURE — 99214 PR OFFICE/OUTPT VISIT, EST, LEVL IV, 30-39 MIN: ICD-10-PCS | Mod: S$GLB,,, | Performed by: INTERNAL MEDICINE

## 2021-03-30 NOTE — ASSESSMENT & PLAN NOTE
"    17 y.o. MALE WELL CHILD EXAM   [place]    17-Adult MALE WELL CHILD EXAM   Beck Snyder is a 17 y.o. 8 m.o.male     History given by mother     CONCERNS/QUESTIONS: History of being on Metformin . 1000 mg BID 4-5 years ago , was taken off by PMD , Mother unaware of diagnosis , thought to be \"pre diabetes\", was also seen by RD in Cardiac office . No records . Mother is fustrated that she is unable to get child to exercise or eat better . He is here to establish a better relationship with this provider to hopefully prevent diabetes and heart disease .  Diabetes     IMMUNIZATION:Refused flu but left before received HPV     NUTRITION, ELIMINATION, SLEEP, SOCIAL , SCHOOL     5210 Nutrition Screening:  [place] , family  Maternal grand mother cooks   Breakfast Eggs 3 fried , rice , occ meat  Water ( 0800- 0900)   Lunch Not eating .   Small bag of chips / crackers Water   Supper Late Rice and meat , rare vegetables   Rice meat , rare vegativs  Desserts none   Fruits     PHYSICAL ACTIVITY/EXERCISE/SPORTS: None , per mother stays in room and does not have any activity     ELIMINATION:   Has good urine output and BM's are soft? Yes    SLEEP PATTERN:   Easy to fall asleep? Yes  Sleeps through the night? Yes    SOCIAL HISTORY:   The patient lives at home with mother , grand mother     School: [employer] , 0900 to 3 pm M-F [employer] in room , rarely leaves room     HISTORY     Past Medical History:   Diagnosis Date   • Asthma    • Eczema    • Family disruption by separation or divorce 9/1/2011   • Food allergy 9/1/2011     Patient Active Problem List    Diagnosis Date Noted   • Eczema 08/13/2012   • Food allergy 09/01/2011   • Disruption of family by separation and divorce 09/01/2011   • ASTHMA      Past Surgical History:   Procedure Laterality Date   • APPENDECTOMY LAPAROSCOPIC  11/06/12   • APPENDECTOMY LAPAROSCOPIC  11/6/2012    Performed by Nelson Houston M.D. at SURGERY [facility] " Stable issue         Family History   Problem Relation Age of Onset   • Hypertension Father    • Diabetes Maternal Grandfather    • Heart Disease Paternal Grandmother    • Hypertension Paternal Grandmother    • Heart Attack Paternal Grandfather    • Heart Disease Paternal Grandfather    • Hypertension Paternal Grandfather      Current Outpatient Medications   Medication Sig Dispense Refill   • acetaminophen-codeine 120-12 MG/5ML suspension Take 5 mL by mouth every four hours as needed for Mild Pain. 360 mL 1     No current facility-administered medications for this visit.     Allergies   Allergen Reactions   • Cillins [Penicillins]      Rash       REVIEW OF SYSTEMS     Constitutional: Afebrile, good appetite, alert. Denies any fatigue.  HENT: No congestion, no nasal drainage. Denies any headaches or sore throat.   Eyes: Vision appears to be normal.   Respiratory: Negative for any difficulty breathing or chest pain.   Cardiovascular: Negative for changes in color/activity.   Gastrointestinal: Negative for any vomiting, constipation or blood in stool.  Genitourinary: Ample urination, denies dysuria.  Musculoskeletal: Negative for any pain or discomfort with movement of extremities.  Skin: Negative for rash or skin infection.  Neurological: Negative for any weakness or decrease in strength.     Psychiatric/Behavioral: Appropriate for age.     DEVELOPMENTAL SURVEILLANCE :    15-17 yrs  Forms caring and supportive relationships? Yes  Demonstrates physical, cognitive, emotional, social and moral competencies? Yes  Exhibits compassion and empathy? Yes  Uses independent decision-making skills? Yes  Displays self confidence? Yes  Follows rules at home and school? Yes  Takes responsibility for home, chores, belongings? Yes   Takes safety precautions? (Helmet, seat belts etc) Yes    SCREENINGS     Office Visit on 03/30/2021   Component Date Value Ref Range Status   • Right Eye (OD) Spherical Equivalen* 03/30/2021 -0.25   Final   • Right Eye  (OD) Sphere (DS) 03/30/2021 0.00   Final   • Right Eye (OD) Cylinder (DS) 03/30/2021 -0.75   Final   • Right Eye (OD) Axis 03/30/2021 @180   Final   • Left Eye (OS) Spherical Equivalent* 03/30/2021 -0.25   Final   • Left Eye (OS) Sphere (DS) 03/30/2021 0.00   Final   • Left Eye (OS) Cylinder (DS) 03/30/2021 -0.50   Final   • Left Eye (OS) Axis 03/30/2021 @65   Final   • Spot Vision Screening Result 03/30/2021 PASS   Final   • OAE Hearing Screen Selected Protoc* 03/30/2021 DP 4s   Final   • Left Ear OAE Hearing Screen Result 03/30/2021 PASS   Final   • Right Ear OAE Hearing Screen Result 03/30/2021 PASS   Final           Hospital Outpatient Visit on 03/30/2021   Component Date Value Ref Range Status   • C Reactive Protein High Sensitive 03/30/2021 6.4  0.0 - 7.5 mg/L Final    Comment: CDC/AHA Recommendations for Relative Risk Categories for hsCRP  Relative Risk                 Average hs-CRP level  Low                             <1.0 mg/L  Average Risk                    1.0-3.0 mg/L  High Risk                       >3.0 mg/L  Increased hs-CRP values are non-specific and should not be  interpreted without a complete clinical history. hs-CRP  levels should be measured twice (averaging the results),  optimally 2 weeks apart, fasting or non-fasting.  hs-CRP  should be used in conjunction with conventional risk assess-  ment for cardiovascular disease to guide therapy decisions.     • WBC 03/30/2021 8.3  4.8 - 10.8 K/uL Final   • RBC 03/30/2021 5.53  4.70 - 6.10 M/uL Final   • Hemoglobin 03/30/2021 15.9  14.0 - 18.0 g/dL Final   • Hematocrit 03/30/2021 46.0  42.0 - 52.0 % Final   • MCV 03/30/2021 83.2  81.4 - 97.8 fL Final   • MCH 03/30/2021 28.8  27.0 - 33.0 pg Final   • MCHC 03/30/2021 34.6  33.7 - 35.3 g/dL Final   • RDW 03/30/2021 37.9  37.1 - 44.2 fL Final   • Platelet Count 03/30/2021 398  164 - 446 K/uL Final   • MPV 03/30/2021 9.5  9.0 - 12.9 fL Final   • Sodium 03/30/2021 144  135 - 145 mmol/L Final   •  Potassium 03/30/2021 4.2  3.6 - 5.5 mmol/L Final   • Chloride 03/30/2021 106  96 - 112 mmol/L Final   • Co2 03/30/2021 25  20 - 33 mmol/L Final   • Anion Gap 03/30/2021 13.0  7.0 - 16.0 Final   • Glucose 03/30/2021 121* 40 - 99 mg/dL Final   • Bun 03/30/2021 9  8 - 22 mg/dL Final   • Creatinine 03/30/2021 0.62  0.50 - 1.40 mg/dL Final   • Calcium 03/30/2021 9.9  8.5 - 10.5 mg/dL Final   • AST(SGOT) 03/30/2021 125* 12 - 45 U/L Final   • ALT(SGPT) 03/30/2021 131* 2 - 50 U/L Final   • Alkaline Phosphatase 03/30/2021 258  100 - 380 U/L Final   • Total Bilirubin 03/30/2021 0.8  0.1 - 1.2 mg/dL Final   • Albumin 03/30/2021 4.7  3.2 - 4.9 g/dL Final   • Total Protein 03/30/2021 7.7  6.0 - 8.2 g/dL Final   • Globulin 03/30/2021 3.0  1.9 - 3.5 g/dL Final   • A-G Ratio 03/30/2021 1.6  g/dL Final   • Free T-4 03/30/2021 1.55  0.93 - 1.70 ng/dL Final    Please note new FT4 reference range effective 4/29/2020.   • TSH 03/30/2021 3.170  0.680 - 3.350 uIU/mL Final    Comment: Please note new reference ranges effective 12/14/2017 10:00 AM  Pregnant Females, 1st Trimester  0.050-3.700  Pregnant Females, 2nd Trimester  0.310-4.350  Pregnant Females, 3rd Trimester  0.410-5.180     • Cholesterol,Tot 03/30/2021 195* 118 - 191 mg/dL Final   • Triglycerides 03/30/2021 133  38 - 143 mg/dL Final   • HDL 03/30/2021 40  >=40 mg/dL Final   • LDL 03/30/2021 128* <100 mg/dL Final   • Glycohemoglobin 03/30/2021 9.3* 4.0 - 5.6 % Final    Comment: Increased risk for diabetes:  5.7 -6.4%  Diabetes:  >6.4%  Glycemic control for adults with diabetes:  <7.0%  The above interpretations are per ADA guidelines.  Diagnosis  of diabetes mellitus on the basis of elevated Hemoglobin A1c  should be confirmed by repeating the Hb A1c test.     • Est Avg Glucose 03/30/2021 220  mg/dL Final    Comment: The eAG calculation is based on the A1c-Derived Daily Glucose  (ADAG) study.  See the ADA's website for additional information.     • Fasting Status 03/30/2021  "Fasting   Final   ]       SELECTIVE SCREENINGS INDICATED WITH SPECIFIC RISK CONDITIONS:   Dyslipidemia indicated Labs Indicated: Yes . Labs are ordered    (Family Hx, pt has diabetes, HTN, BMI >95%ile. Obtain labs once between the 17 and 21 yr old visit)     STI's: Is child sexually active? No    HIV testing once between year 15 and 18     Depression screen for 12 and older:   Depression:   Depression Screen (PHQ-2/PHQ-9) 3/30/2021   PHQ-2 Total Score 0         OBJECTIVE      PHYSICAL EXAM:   Reviewed vital signs and growth parameters in EMR.     /70   Pulse 68   Temp 36.4 °C (97.6 °F) (Temporal)   Resp 16   Ht 1.656 m (5' 5.2\")   Wt 94 kg (207 lb 3.7 oz)   SpO2 97%   BMI 34.27 kg/m²     Blood pressure reading is in the elevated blood pressure range (BP >= 120/80) based on the 2017 AAP Clinical Practice Guideline.    Height - 18 %ile (Z= -0.91) based on CDC (Boys, 2-20 Years) Stature-for-age data based on Stature recorded on 3/30/2021.  Weight - 99 %ile (Z= 2.20) based on CDC (Boys, 2-20 Years) weight-for-age data using vitals from 3/30/2021.  BMI - >99 %ile (Z= 2.37) based on CDC (Boys, 2-20 Years) BMI-for-age based on BMI available as of 3/30/2021.    General: This is an alert, active child in no distress.   HEAD: Normocephalic, atraumatic.   EYES: PERRL. EOMI. No conjunctival injection or discharge.   EARS: TM’s are transparent with good landmarks. Canals are patent.  NOSE: Nares are patent and free of congestion.  MOUTH:  Dentition appears normal without significant decay  THROAT: Oropharynx has no lesions, moist mucus membranes, without erythema, tonsils normal.   NECK: Supple, no lymphadenopathy or masses.   HEART: Regular rate and rhythm without murmur. Pulses are 2+ and equal.    LUNGS: Clear bilaterally to auscultation, no wheezes or rhonchi. No retractions or distress noted.  ABDOMEN: Normal bowel sounds, soft and non-tender without hepatomegaly or splenomegaly or masses.   GENITALIA: Male: " normal circumcised penis. No hernia. No hydrocele or masses.  Shawn Stage III.  MUSCULOSKELETAL: Spine is straight. Extremities are without abnormalities. Moves all extremities well with full range of motion.    NEURO: Oriented x3. Cranial nerves intact. Reflexes 2+. Strength 5/5.  SKIN: Intact without significant rash. Skin is warm, dry, and pink. Acanthosis nigrans       ASSESSMENT AND PLAN     1. Well Child Exam:  Healthy 17 y.o. 8 m.o. old with  abnormal findings  2. Dietary counseling  Long discussion on serving size , low glycemic diet . Discussed that rice needs to be limited , add more fruits and vegetables , lean meats , growth of child and goal of no weight gain in three months     3 Exercise counseling  Needs daily plan for 20 - 30 minutes of exercise daily     5. Screening for depression  Negative screen     6. Encounter for screening involving social determinants of health (SDoH)  Negative     7. Need for vaccination  8. BMI (body mass index), pediatric, > 99% for age  .Parent & Child counseled on the risks associated with obesity to include diabetes, heart disease, and fatty liver. Encouraged to limit TV to less than 1 hour per day & exercise 20-30 minutes per day. Decrease juice intake to no more than one glass daily (watered down is preferred). Avoid hidden fats in things such as ketchup, sauces, and processed foods. We discussed the importance of healthy sleep habits. RTC in 3 months for weight check.   - Ibuprofen (MOTRIN PO); 200 mg.  - HEMOGLOBIN A1C; Future  - Lipid Profile; Future  - TSH; Future  - FREE THYROXINE; Future  - Comp Metabolic Panel; Future  - CBC WITHOUT DIFFERENTIAL; Future  - CRP HIGH SENSITIVE (CARDIAC); Future    9. Abnormal liver enzymes  Known previously as abnormal   - Comp Metabolic Panel; Future    10. Pre-diabetes  Was on Metformin approximately 4 years ago and stopped ,   - HEMOGLOBIN A1C; Future      1. Anticipatory guidance was reviewed as above, healthy lifestyle  including diet and exercise discussed and Bright Futures handout provided.  2. Return to clinic annually for well child exam or as needed.  3. Immunizations given None   4. Vaccine Information statements given for each vaccine if administered. Discussed benefits and side effects of each vaccine administered with patient/family and answered all patient /family questions.    5. Multivitamin with 400iu of Vitamin D po qd.  6. Dental exams twice yearly at established dental home.  7. Received labs results , no FU is planned . TC to mother , voice mail left indicating labs are abnormal and FU is promptly needed .

## 2021-04-17 ENCOUNTER — IMMUNIZATION (OUTPATIENT)
Dept: PRIMARY CARE CLINIC | Facility: CLINIC | Age: 61
End: 2021-04-17
Payer: COMMERCIAL

## 2021-04-17 DIAGNOSIS — Z23 NEED FOR VACCINATION: Primary | ICD-10-CM

## 2021-04-17 PROCEDURE — 0001A PR IMMUNIZ ADMIN, SARS-COV-2 COVID-19 VACC, 30MCG/0.3ML, 1ST DOSE: ICD-10-PCS | Mod: CV19,S$GLB,, | Performed by: INTERNAL MEDICINE

## 2021-04-17 PROCEDURE — 0001A PR IMMUNIZ ADMIN, SARS-COV-2 COVID-19 VACC, 30MCG/0.3ML, 1ST DOSE: CPT | Mod: CV19,S$GLB,, | Performed by: INTERNAL MEDICINE

## 2021-04-17 PROCEDURE — 91300 PR SARS-COV- 2 COVID-19 VACCINE, NO PRSV, 30MCG/0.3ML, IM: ICD-10-PCS | Mod: S$GLB,,, | Performed by: INTERNAL MEDICINE

## 2021-04-17 PROCEDURE — 91300 PR SARS-COV- 2 COVID-19 VACCINE, NO PRSV, 30MCG/0.3ML, IM: CPT | Mod: S$GLB,,, | Performed by: INTERNAL MEDICINE

## 2021-04-17 RX ADMIN — Medication 0.3 ML: at 04:04

## 2021-05-08 ENCOUNTER — IMMUNIZATION (OUTPATIENT)
Dept: PRIMARY CARE CLINIC | Facility: CLINIC | Age: 61
End: 2021-05-08
Payer: COMMERCIAL

## 2021-05-08 DIAGNOSIS — Z23 NEED FOR VACCINATION: Primary | ICD-10-CM

## 2021-05-08 PROCEDURE — 91300 PR SARS-COV- 2 COVID-19 VACCINE, NO PRSV, 30MCG/0.3ML, IM: CPT | Mod: PBBFAC | Performed by: INTERNAL MEDICINE

## 2021-05-08 PROCEDURE — 0002A PR IMMUNIZ ADMIN, SARS-COV-2 COVID-19 VACC, 30MCG/0.3ML, 2ND DOSE: CPT | Mod: PBBFAC | Performed by: INTERNAL MEDICINE

## 2021-05-08 RX ADMIN — RNA INGREDIENT BNT-162B2 0.3 ML: 0.23 INJECTION, SUSPENSION INTRAMUSCULAR at 12:05

## 2021-05-31 ENCOUNTER — TELEPHONE (OUTPATIENT)
Dept: PULMONOLOGY | Facility: CLINIC | Age: 61
End: 2021-05-31

## 2021-05-31 RX ORDER — AZITHROMYCIN 250 MG/1
TABLET, FILM COATED ORAL
Qty: 6 TABLET | Refills: 0 | Status: SHIPPED | OUTPATIENT
Start: 2021-05-31 | End: 2023-01-13

## 2022-01-12 ENCOUNTER — TELEPHONE (OUTPATIENT)
Dept: OBSTETRICS AND GYNECOLOGY | Facility: CLINIC | Age: 62
End: 2022-01-12
Payer: COMMERCIAL

## 2022-01-12 DIAGNOSIS — Z12.31 BREAST CANCER SCREENING BY MAMMOGRAM: Primary | ICD-10-CM

## 2022-03-03 ENCOUNTER — OFFICE VISIT (OUTPATIENT)
Dept: PULMONOLOGY | Facility: CLINIC | Age: 62
End: 2022-03-03
Payer: COMMERCIAL

## 2022-03-03 VITALS
DIASTOLIC BLOOD PRESSURE: 92 MMHG | HEART RATE: 94 BPM | SYSTOLIC BLOOD PRESSURE: 144 MMHG | OXYGEN SATURATION: 95 % | WEIGHT: 271 LBS | BODY MASS INDEX: 49.57 KG/M2

## 2022-03-03 DIAGNOSIS — G47.33 OBSTRUCTIVE SLEEP APNEA SYNDROME: Primary | ICD-10-CM

## 2022-03-03 DIAGNOSIS — J45.30 MILD PERSISTENT ASTHMA WITHOUT COMPLICATION: ICD-10-CM

## 2022-03-03 DIAGNOSIS — R06.00 DYSPNEA, UNSPECIFIED TYPE: ICD-10-CM

## 2022-03-03 PROCEDURE — 99214 PR OFFICE/OUTPT VISIT, EST, LEVL IV, 30-39 MIN: ICD-10-PCS | Mod: S$GLB,,, | Performed by: INTERNAL MEDICINE

## 2022-03-03 PROCEDURE — 99214 OFFICE O/P EST MOD 30 MIN: CPT | Mod: S$GLB,,, | Performed by: INTERNAL MEDICINE

## 2022-03-03 RX ORDER — ALBUTEROL SULFATE 90 UG/1
2 AEROSOL, METERED RESPIRATORY (INHALATION) EVERY 6 HOURS PRN
Qty: 18 G | Refills: 11 | Status: SHIPPED | OUTPATIENT
Start: 2022-03-03 | End: 2023-09-13 | Stop reason: SDUPTHER

## 2022-03-03 RX ORDER — LEVALBUTEROL INHALATION SOLUTION 0.63 MG/3ML
SOLUTION RESPIRATORY (INHALATION)
Qty: 75 ML | Refills: 11 | Status: SHIPPED | OUTPATIENT
Start: 2022-03-03 | End: 2023-09-13 | Stop reason: SDUPTHER

## 2022-03-03 RX ORDER — FLUTICASONE PROPIONATE AND SALMETEROL 250; 50 UG/1; UG/1
POWDER RESPIRATORY (INHALATION)
Qty: 1 EACH | Refills: 11 | Status: SHIPPED | OUTPATIENT
Start: 2022-03-03

## 2022-03-03 NOTE — PROGRESS NOTES
HPI:    7/25/2017 - Here for follow up, overall doing well, no recent problems with her asthma, reports good compliance and benefit with her CPAP.    Pt asthma is currently stable with no increases in SOB, SOARES or wheezing.  There has been no increase in use of  rescue medications.  Have reviewed medications with pt including the roles of rescue and controlling medications.  All questions answered.  Pt reports no problems with technique with inhalers.  We discussed the possibility of de-escalation of therapy if asthma control remains stable.    ACT - 20    6/20/2019 - Here for follow up, Pt asthma is currently stable with no increases in SOB, SOARES or wheezing.  There has been no increase in use of  rescue medications.  Have reviewed medications with pt including the roles of rescue and controlling medications.  All questions answered.  Pt reports no problems with technique with inhalers.  We discussed the possibility of de-escalation of therapy if asthma control remains stable.  Has not been in hospital since the last time I saw her.  Going on a cruise in August.  Has gained about 21# (d/w her)     ACT - 20    11/27/2019 - Over last 3 weeks has had progressive SOB and having trouble with desaturations while awake.  Came to office for evaluation and sats were 79% on RA, no fever, chills, sweats, has not had much cough or congestion.  She reports that she has been using her CPAP regularly but still has somnolence.  SOB much worse with minimal exertion.  No chest pain of any type.  No increase in fluid retention or change in weight, some swelling of hands.    5/4/2020 - Virtual Visit    The chief complaint leading to consultation is: follow up  The patient location is:  home  Visit type: Virtual visit with synchronous audio and video    Here for follow up - last visit was 6 months ago - she was admitted and treated and went home and was told to increase CPAP to 15 but she may need BiPAP - she needs to have a repeat  titration study.  She is concerned about going in for the study because of the Covid issues.  Her breathing has been a little more labored.  Has some chronic SOB, SOARES and reports using O2 at 2-3 LPM.  No recent fever, chills, sweats, cough, sputum.    I have discussed the limitations of a virtual visit with the patient including the fact that there are no vital signs and no way to fully examine the patient.  This could lead to misdiagnosis and possibly to delays in appropriate care.    The physical exam in this note is pulled forward from prior visits.  It will be updated based on any findings which I can discern based on seeing the patient on a video screen.  I will not eliminate findings from prior exams at this visit.    6/18/2020 - Here for follow up, has had trouble with increased SOB, SOARES.  She was sent home from the hospital ([facility]) in November and was told she needed BiPAP.  We have been working on restudying her - she had home sleep study last night - report not available yet.  No other symptoms but her dyspnea is clearly worse than before.  O2 sats at home have been 88% and above for the most part on room air.  No CHF symptoms.    6/19/2020 - HST shows sleep apnea (KATHY 6) but with severe desaturations (50's sats) - will order titration study.    3/18/2021 - Here for follow up, overall doing well.  She needs to have a concentrator removed from home (has 2 for some reason and doesn't want to keep reserve O2 cylinder).  Breathing has been at her baseline.  Patient has no known corona virus exposures and has been practicing social distancing.  We have discussed the virus and precautions and all questions have been answered.     3/3/2022 - Here for follow up, Patient is here for follow up visit for sleep apnea and therapy.  They report no issues with their machine.  They report good compliance with the therapy and feel that they are benefiting from it.  Discussed alternative therapies/options as appropriate.   Discussed diet, weight and exercise as appropriate.  All questions answered.   Breathing has been OK.  She is planning a trip to [place] for a wedding soon.  Patient has no known corona virus exposures and has been practicing social distancing.  We have discussed the virus and precautions and all questions have been answered.  She has had Covid vaccine (Pfizer) and booster.      CPAP Therapy    CPAP therapy - 16 + 3 LPM    Date of sleep study - 6/2020 RDI - 6 (HST)    Pt reports good compliance and benefit with the therapy.    Face to face visit with pt concerning their CPAP therapy.  I have stressed continued compliance with the treatment and all questions were answered.    Home Oxygen 2 - 3 LPM uses qhs and prn    Face to face visit with pt regarding their home oxygen.  We have discussed the need for oxygen including continuous use, prn use or night time use (as appropriate for the pt).  We discussed the need for compliance with the therapy. We will do recertifications as necessary.     Nebulizer    Face to face visit regarding home nebulizer use.    I have discussed with the pt regarding the need for home nebulizer therapy.  I have stressed the need for compliance with the therapy and the need to let me know if there are any issues with the medication.  All questions answered.      Medications    Current Outpatient Medications:     albuterol (PROAIR HFA) 90 mcg/actuation inhaler, Inhale 2 puffs into the lungs every 6 (six) hours as needed for Wheezing. Rescue, Disp: 18 g, Rfl: 11    apixaban (ELIQUIS) 5 mg Tab, Take 1 tablet (5 mg total) by mouth 2 (two) times daily., Disp: 30 tablet, Rfl: 0    aspirin (ASPIR-81 ORAL), Take 1 tablet by mouth once daily., Disp: , Rfl:     atorvastatin (LIPITOR) 20 MG tablet, Take 20 mg by mouth once daily. , Disp: , Rfl: 3    azithromycin (ZITHROMAX Z-KAM) 250 MG tablet, Take 2 po today then 1 a day for 4 days, Disp: 6 tablet, Rfl: 0    BD INSULIN SYRINGE ULTRA-FINE 0.5 mL  "31 gauge x 5/16 Syrg, USE TO INJECT 4X DAILY, Disp: , Rfl: 1    blood sugar diagnostic (FREESTYLE LITE STRIPS MISC), FreeStyle Lite Strips  USE UTD QID, Disp: , Rfl:     cholecalciferol, vitamin D3, 1,250 mcg (50,000 unit) capsule, Take 1 each by mouth twice a week. Monday and Tuesdays, Disp: , Rfl:     clobetasoL (TEMOVATE) 0.05 % cream, Apply topically 2 (two) times daily., Disp: 60 g, Rfl: 3    ergocalciferol (ERGOCALCIFEROL) 50,000 unit Cap, TK 1 C PO 2 TIMES A WK, Disp: , Rfl:     ferrous sulfate (FEOSOL) 325 mg (65 mg iron) Tab tablet, TK 1 T PO BID, Disp: , Rfl:     fluticasone-salmeterol diskus inhaler 250-50 mcg, INHALE ONE PUFF INTO THE LUNGS TWICE DAILY, Disp: 1 each, Rfl: 11    FREESTYLE INSULINX TEST STRIPS Strp, TEST BLOOD SUGAR QID, Disp: , Rfl: 3    FREESTYLE LANCETS 28 gauge lancets, TEST QID, Disp: , Rfl: 3    gabapentin (NEURONTIN) 300 MG capsule, Take 600 mg by mouth 2 (two) times daily., Disp: , Rfl:     glimepiride (AMARYL) 4 MG tablet, Take 4 mg by mouth before breakfast., Disp: , Rfl:     hyaluronate sod, cross-linked (GEL-ONE) 30 mg/3 mL, 3 mLs., Disp: , Rfl:     hydrochlorothiazide (HYDRODIURIL) 25 MG tablet, Take 25 mg by mouth once daily. , Disp: , Rfl: 3    hylan g-f 20 (SYNVISC ONE) 48 mg/6 mL Syrg, 6 mLs., Disp: , Rfl:     insulin aspart U-100 (NOVOLOG) 100 unit/mL (3 mL) InPn pen, Inject 25 Units into the skin 3 (three) times daily. Pt states sliding scale, Disp: , Rfl:     insulin glargine 100 units/mL (3mL) SubQ pen, Inject 35 Units into the skin once daily. , Disp: , Rfl:     insulin syringe-needle U-100 0.5 mL 31 gauge x 5/16" Syrg, BD Insulin Syringe Ultra-Fine 0.5 mL 31 gauge x 5/16", Disp: , Rfl:     irbesartan (AVAPRO) 300 MG tablet, Take 300 mg by mouth once daily. , Disp: , Rfl: 3    JARDIANCE 10 mg tablet, , Disp: , Rfl:     lancets 28 gauge Misc, FreeStyle Lancets 28 gauge, Disp: , Rfl:     levalbuterol (XOPENEX) 0.63 mg/3 mL nebulizer solution, USE 1 " "VIAL VIA NEBULIZER THREE TIMES DAILY AS NEEDED FOR WHEEZING, Disp: 75 mL, Rfl: 11    levothyroxine (SYNTHROID) 25 MCG tablet, Take 25 mcg by mouth once daily. , Disp: , Rfl: 3    liraglutide 0.6 mg/0.1 mL, 18 mg/3 mL, subq PNIJ (VICTOZA 2-KAM) 0.6 mg/0.1 mL (18 mg/3 mL) PnIj pen, Inject 1.8 mg into the skin once daily. , Disp: , Rfl:     magnesium oxide (MAG-OX) 400 mg (241.3 mg magnesium) tablet, Take 400 mg by mouth once daily., Disp: , Rfl:     metformin (GLUCOPHAGE) 500 MG tablet, Take 500 mg by mouth 2 (two) times daily with meals. , Disp: , Rfl: 3    omeprazole (PRILOSEC) 20 MG capsule, Take 20 mg by mouth once daily. , Disp: , Rfl: 1    pen needle, diabetic 32 gauge x 5/32" Ndle, BD Ultra-Fine Whit Pen Needle 32 gauge x 5/32", Disp: , Rfl:     sodium hyaluronate, orthovisc, 30 mg/2 mL, ORTHOVISC 30 mg/2 mL intra-articular syringe, Disp: , Rfl:     diltiaZEM (CARDIZEM CD) 120 MG Cp24, Take 1 capsule (120 mg total) by mouth once daily., Disp: 30 capsule, Rfl: 0    Allergies  Review of patient's allergies indicates:   Allergen Reactions    Benadryl allergy decongestant     Codeine        Social History    Social History     Tobacco Use   Smoking Status Never Smoker   Smokeless Tobacco Never Used     ETOH - 1-2 drinks per week.  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   HENT: Negative for congestion.    Eyes: Negative for pain.   Respiratory: Negative for cough, hemoptysis, sputum production, shortness of breath, wheezing and stridor.    Cardiovascular: Negative for chest pain, palpitations, orthopnea, claudication, leg swelling and PND. "   Gastrointestinal: Negative for abdominal pain, constipation, diarrhea, heartburn, nausea and vomiting.   Genitourinary: Negative for dysuria, frequency and urgency.   Musculoskeletal: Negative for falls and myalgias.   Neurological: Negative for sensory change, focal weakness and weakness.   Psychiatric/Behavioral: Negative for depression. The patient is not nervous/anxious.        Physical Exam    Vitals:    03/03/22 1107   BP: (!) 144/92   BP Location: Left arm   Patient Position: Sitting   BP Method: X-Large (Manual)   Pulse: 94   SpO2: 95%   Weight: 122.9 kg (271 lb)       Physical Exam  Vitals and nursing note reviewed.   Constitutional:       General: She is not in acute distress.     Appearance: She is well-developed. She is not diaphoretic.      Comments: obese   HENT:      Head: Normocephalic and atraumatic.      Right Ear: External ear normal.      Left Ear: External ear normal.      Nose: Nose normal.   Eyes:      Conjunctiva/sclera: Conjunctivae normal.      Pupils: Pupils are equal, round, and reactive to light.   Neck:      Thyroid: No thyromegaly.      Vascular: No JVD.      Trachea: No tracheal deviation.   Cardiovascular:      Rate and Rhythm: Normal rate and regular rhythm.      Heart sounds: Murmur heard.     No friction rub. No gallop.      Comments: 2/6 REMIGIO  Pulmonary:      Effort: Pulmonary effort is normal. No respiratory distress.      Breath sounds: Normal breath sounds. No stridor. No wheezing or rales.   Chest:      Chest wall: No tenderness.   Abdominal:      General: Bowel sounds are normal. There is no distension.      Palpations: Abdomen is soft.      Tenderness: There is no abdominal tenderness.   Musculoskeletal:         General: No tenderness. Normal range of motion.      Cervical back: Normal range of motion and neck supple.   Lymphadenopathy:      Cervical: No cervical adenopathy.   Skin:     General: Skin is warm and dry.   Neurological:      Mental Status: She is alert and  oriented to person, place, and time.      Cranial Nerves: No cranial nerve deficit.   Psychiatric:         Behavior: Behavior normal.         Lab      Xray      Impression/Plan  Problem List Items Addressed This Visit        Neuro    Obstructive sleep apnea syndrome   Continue present PAP therapy   Pt to call if they have any trouble with their machines   Discussed with pt about signs and symptoms to watch for   Will refill supplies as needed   Have encouraged pt to continue to work on diet, weight loss and exercise         Pulmonary    Chronic respiratory failure with hypoxia  ·  seems to be doing OK at home        Mild persistent asthma without complication   Continue present medications.   Will refill medications as needed.   Instructed patient to contact us with any issues concerning their medications (cost, reactions, etc.).   Have discussed with patient about inciting conditions which may exacerbate their disease.   We did discuss possible new therapies or de-escalation of therapy (if appropriate).   Discussed the possibility of looking into alternate therapies and/or steroid sparing options   Discussed whether further evaluation of allergies would be warranted   Discussed whether reflux could be playing a role in their symptoms   All questions answered   RTC 6 months   Patient instructed that they are to call if symptoms change or new issues develop prior to their next visit.      Dyspnea  ·  improved but still present    DM  ·  follows with her primary MD    HTN  ·  reports good control at home  ·  told to watch closely    Heart Murmur  ·  ECHO 8/2019 was OK  · may need repeat                   Fluids/Electrolytes/Nutrition/GI    Morbid obesity  ·  Discussed with pt at length about the need to work on diet and weight loss.  Have offered suggestions on approaches for this problem.  Also discussed the need to start a regular exercise program.  We discussed at length the importance of regular  exercise and working at getting to a healthier weight.  All questions answered.  Pt voices understanding of these principles and hopefully will take action.       Other    Never smoker      Other Visit Diagnoses    None.         Percy Guzmán MD

## 2022-04-12 ENCOUNTER — HOSPITAL ENCOUNTER (OUTPATIENT)
Dept: RADIOLOGY | Facility: OTHER | Age: 62
Discharge: HOME | End: 2022-04-12
Attending: OBSTETRICS & GYNECOLOGY
Payer: COMMERCIAL

## 2022-04-12 DIAGNOSIS — Z12.31 BREAST CANCER SCREENING BY MAMMOGRAM: ICD-10-CM

## 2022-04-12 PROCEDURE — 77063 MAMMO DIGITAL SCREENING BILAT WITH TOMO: ICD-10-PCS | Mod: 26,,, | Performed by: RADIOLOGY

## 2022-04-12 PROCEDURE — 77063 BREAST TOMOSYNTHESIS BI: CPT | Mod: TC

## 2022-04-12 PROCEDURE — 77067 SCR MAMMO BI INCL CAD: CPT | Mod: TC

## 2022-04-12 PROCEDURE — 77067 MAMMO DIGITAL SCREENING BILAT WITH TOMO: ICD-10-PCS | Mod: 26,,, | Performed by: RADIOLOGY

## 2022-04-12 PROCEDURE — 77067 SCR MAMMO BI INCL CAD: CPT | Mod: 26,,, | Performed by: RADIOLOGY

## 2022-04-12 PROCEDURE — 77063 BREAST TOMOSYNTHESIS BI: CPT | Mod: 26,,, | Performed by: RADIOLOGY

## 2022-04-29 NOTE — ASSESSMENT & PLAN NOTE
On liraglutide already  Dietary consultation   Render Post-Care Instructions In Note?: yes Detail Level: Detailed Aperture Size (Optional): C Duration Of Freeze Thaw-Cycle (Seconds): 3 Render Note In Bullet Format When Appropriate: No Application Tool (Optional): Liquid Nitrogen Sprayer Post-Care Instructions: I reviewed with the patient in detail post-care instructions. Patient is to wear sunprotection, and avoid picking at any of the treated lesions. Pt may apply Vaseline to crusted or scabbing areas. Number Of Freeze-Thaw Cycles: 3 freeze-thaw cycles Consent: The patient's consent was obtained including but not limited to risks of crusting, scabbing, blistering, scarring, darker or lighter pigmentary change, recurrence, incomplete removal and infection.

## 2022-05-17 ENCOUNTER — PATIENT MESSAGE (OUTPATIENT)
Dept: OBSTETRICS AND GYNECOLOGY | Facility: CLINIC | Age: 62
End: 2022-05-17
Payer: COMMERCIAL

## 2022-07-29 ENCOUNTER — TELEPHONE (OUTPATIENT)
Dept: OBSTETRICS AND GYNECOLOGY | Facility: CLINIC | Age: 62
End: 2022-07-29
Payer: COMMERCIAL

## 2022-07-29 NOTE — TELEPHONE ENCOUNTER
"Called pt     Pt answered. Pt answered the phone very upset and yelling stating that "she had been cancelled twice, the lady she spoke with previously was told that she was lying stating that she was never a no show and that it is our fault she has been cancelled without being informed. Stated that she showed up to both appointments and was told there was no MD there to see her".    Advised pt that we did try to reach out via phone call and left VMs as well as the portal messages. Asked pt if she is active, pt stated yes that she gets messages. apologized to pt- pt kept informing me that I was now lying about her appointments and "no showing". Simply apologized and stated that I was not here for the appointments and it must not have been cancelled/rescheduled for when she showed up and that is why previous stated that she was a "no show"  Pt began yelling at me very loudly. I asked pt multiple times for pt to please stop yelling at me and that I cannot help her get things fixed if she does.     Pt was offered multiple appointments in November and December for annual but pt declined until 12/2/2022 at [facility].     Pt once again called me a liar before hanging up the phone and I just simply apologized and told her I would try to fix the "no show"    Phone call ended with pt hanging up   "

## 2023-01-13 ENCOUNTER — OFFICE VISIT (OUTPATIENT)
Dept: OBSTETRICS AND GYNECOLOGY | Facility: CLINIC | Age: 63
End: 2023-01-13
Payer: COMMERCIAL

## 2023-01-13 VITALS
HEIGHT: 62 IN | DIASTOLIC BLOOD PRESSURE: 60 MMHG | BODY MASS INDEX: 44.46 KG/M2 | WEIGHT: 241.63 LBS | SYSTOLIC BLOOD PRESSURE: 110 MMHG

## 2023-01-13 DIAGNOSIS — Z12.31 ENCOUNTER FOR SCREENING MAMMOGRAM FOR BREAST CANCER: ICD-10-CM

## 2023-01-13 DIAGNOSIS — Z01.419 ENCOUNTER FOR ANNUAL ROUTINE GYNECOLOGICAL EXAMINATION: Primary | ICD-10-CM

## 2023-01-13 DIAGNOSIS — Z12.11 COLON CANCER SCREENING: ICD-10-CM

## 2023-01-13 DIAGNOSIS — Z13.820 OSTEOPOROSIS SCREENING: ICD-10-CM

## 2023-01-13 PROCEDURE — 99999 PR PBB SHADOW E&M-EST. PATIENT-LVL V: CPT | Mod: PBBFAC,,, | Performed by: OBSTETRICS & GYNECOLOGY

## 2023-01-13 PROCEDURE — 99396 PREV VISIT EST AGE 40-64: CPT | Mod: S$GLB,,, | Performed by: OBSTETRICS & GYNECOLOGY

## 2023-01-13 PROCEDURE — 99396 PR PREVENTIVE VISIT,EST,40-64: ICD-10-PCS | Mod: S$GLB,,, | Performed by: OBSTETRICS & GYNECOLOGY

## 2023-01-13 PROCEDURE — 99999 PR PBB SHADOW E&M-EST. PATIENT-LVL V: ICD-10-PCS | Mod: PBBFAC,,, | Performed by: OBSTETRICS & GYNECOLOGY

## 2023-01-13 NOTE — PROGRESS NOTES
"Chief Complaint: Well Woman Exam     HPI:      Liset Bailon is a 62 y.o.  who presents today for well woman exam.  LMP: No LMP recorded (lmp unknown). Patient has had a hysterectomy.  No issues, problems, or complaints. Specifically, patient denies abnormal vaginal bleeding, discharge, pelvic pain, urinary problems, or changes in appetite. Ms. Bailon is currently sexually active with a single male partner.     Previous Pap: Hysterectomy for benign indications, has never had an abnormal pap  Previous Mammogram: BiRads: 1 T-C Score: 6.19% (22)   Most Recent Dexa: Has not had one  Most Recent Colonoscopy:  High grade polyp  (), Repeat in     Ms. Bailon confirms that she wears her seatbelt when riding in the car and does not text while driving.     OB History          4    Para   3    Term   3            AB   1    Living   3         SAB   1    IAB        Ectopic        Multiple        Live Births   3          Physical Exam:      PHYSICAL EXAM:  /60   Ht 5' 2" (1.575 m)   Wt 109.6 kg (241 lb 10 oz)   LMP  (LMP Unknown)   BMI 44.19 kg/m²   Body mass index is 44.19 kg/m².     APPEARANCE: Well nourished, well developed, in no acute distress.  PSYCH: Appropriate mood and affect.  SKIN: No acne or hirsutism  NECK: Neck symmetric without masses  NODES: No inguinal, axillary, or supraclavicular lymph node enlargement  ABDOMEN: Soft.  No tenderness or masses.    CARDIOVASCULAR: No edema of peripheral extremities  BREASTS: Symmetrical, no visible skin lesions. No palpable masses. No nipple discharge bilaterally.  PELVIC: Normal external genitalia without lesions.  Normal hair distribution.  Adequate perineal body, normal urethral meatus.  Vagina moist and smooth. Without lesions. Vagina without discharge.  Normal appearing vaginal cuff.  No significant cystocele or rectocele.  Bimanual exam extremely limited 2/2 panus..      Assessment/Plan:     Encounter for annual routine " gynecological examination    Osteoporosis screening  -     DXA Bone Density Spine And Hip; Future; Expected date: 01/13/2023    Encounter for screening mammogram for breast cancer  -     Mammo Digital Screening Bilat w/ Danny; Future; Expected date: 01/13/2023    Colon cancer screening  -     Ambulatory referral/consult to Endo Procedure ; Future; Expected date: 01/14/2023      Follow up in about 1 year (around 1/13/2024) for Annual Exam.    Counseling:     Patient was counseled today on current ASCCP pap guidelines, the recommendation for yearly physical exams, safe driving habits, breast self awareness and annual mammograms. She is to see her PCP for other health maintenance.     Use of the [employer] Patient Portal discussed and encouraged during today's visit.

## 2023-03-15 ENCOUNTER — TELEPHONE (OUTPATIENT)
Dept: PULMONOLOGY | Facility: CLINIC | Age: 63
End: 2023-03-15

## 2023-03-15 NOTE — TELEPHONE ENCOUNTER
----- Message from Whit Min MA sent at 3/14/2023 11:26 AM CDT -----  Regarding: schedule  Griseldaluis a needs to schedule a appointment she wants a new mask but the dme company needs to have a updated visit in order for us to do that . Please call her an schedule

## 2023-04-20 ENCOUNTER — TELEPHONE (OUTPATIENT)
Dept: OBSTETRICS AND GYNECOLOGY | Facility: CLINIC | Age: 63
End: 2023-04-20
Payer: COMMERCIAL

## 2023-04-20 ENCOUNTER — OCCUPATIONAL HEALTH (OUTPATIENT)
Dept: OBSTETRICS AND GYNECOLOGY | Facility: CLINIC | Age: 63
End: 2023-04-20
Payer: COMMERCIAL

## 2023-04-20 DIAGNOSIS — Z13.820 OSTEOPOROSIS SCREENING: Primary | ICD-10-CM

## 2023-05-17 ENCOUNTER — HOSPITAL ENCOUNTER (OUTPATIENT)
Dept: RADIOLOGY | Facility: OTHER | Age: 63
Discharge: HOME | End: 2023-05-17
Attending: OBSTETRICS & GYNECOLOGY
Payer: COMMERCIAL

## 2023-05-17 DIAGNOSIS — Z12.31 ENCOUNTER FOR SCREENING MAMMOGRAM FOR BREAST CANCER: ICD-10-CM

## 2023-05-17 DIAGNOSIS — Z13.820 OSTEOPOROSIS SCREENING: ICD-10-CM

## 2023-05-17 PROCEDURE — 77067 MAMMO DIGITAL SCREENING BILAT WITH TOMO: ICD-10-PCS | Mod: 26,,, | Performed by: RADIOLOGY

## 2023-05-17 PROCEDURE — 77080 DXA BONE DENSITY AXIAL: CPT | Mod: TC

## 2023-05-17 PROCEDURE — 77063 MAMMO DIGITAL SCREENING BILAT WITH TOMO: ICD-10-PCS | Mod: 26,,, | Performed by: RADIOLOGY

## 2023-05-17 PROCEDURE — 77063 BREAST TOMOSYNTHESIS BI: CPT | Mod: 26,,, | Performed by: RADIOLOGY

## 2023-05-17 PROCEDURE — 77067 SCR MAMMO BI INCL CAD: CPT | Mod: TC

## 2023-05-17 PROCEDURE — 77080 DXA BONE DENSITY AXIAL: CPT | Mod: 26,,, | Performed by: RADIOLOGY

## 2023-05-17 PROCEDURE — 77080 DXA BONE DENSITY AXIAL SKELETON 1 OR MORE SITES: ICD-10-PCS | Mod: 26,,, | Performed by: RADIOLOGY

## 2023-05-17 PROCEDURE — 77067 SCR MAMMO BI INCL CAD: CPT | Mod: 26,,, | Performed by: RADIOLOGY

## 2023-05-25 ENCOUNTER — CLINICAL SUPPORT (OUTPATIENT)
Dept: ENDOSCOPY | Facility: HOSPITAL | Age: 63
End: 2023-05-25
Attending: OBSTETRICS & GYNECOLOGY
Payer: COMMERCIAL

## 2023-05-25 ENCOUNTER — PATIENT MESSAGE (OUTPATIENT)
Dept: ENDOSCOPY | Facility: HOSPITAL | Age: 63
End: 2023-05-25

## 2023-05-25 VITALS — BODY MASS INDEX: 44.35 KG/M2 | HEIGHT: 62 IN | WEIGHT: 241 LBS

## 2023-05-25 DIAGNOSIS — Z12.11 COLON CANCER SCREENING: ICD-10-CM

## 2023-05-25 NOTE — PLAN OF CARE
WE ATTEMPTED TO REACH YOU TO SCHEDULE PROCEDURE. Please Call Endoscopy Scheduling Dept at  [phone].to schedule Colonoscopy.

## 2023-06-15 ENCOUNTER — TELEPHONE (OUTPATIENT)
Dept: OBSTETRICS AND GYNECOLOGY | Facility: CLINIC | Age: 63
End: 2023-06-15
Payer: COMMERCIAL

## 2023-06-15 NOTE — TELEPHONE ENCOUNTER
Dr Chang I called pt and gave pt mammo results and Bone density results  Pt has additional questions on the bone density .

## 2023-06-16 NOTE — TELEPHONE ENCOUNTER
Called pt and advised per Dr. Chang.    Pt states she is already taking Vit D Rx.    Advised to add Calcium to diet, whether supplement or through diet.  Pt verbalizes understanding and will reach out to PCP to prescribe Calcium supplements and will try to incorporate more dairy products and green leafy vegetables to diet.

## 2023-09-13 ENCOUNTER — OFFICE VISIT (OUTPATIENT)
Dept: PULMONOLOGY | Facility: CLINIC | Age: 63
End: 2023-09-13
Payer: COMMERCIAL

## 2023-09-13 VITALS
BODY MASS INDEX: 44.45 KG/M2 | OXYGEN SATURATION: 94 % | WEIGHT: 243 LBS | HEART RATE: 90 BPM | DIASTOLIC BLOOD PRESSURE: 80 MMHG | SYSTOLIC BLOOD PRESSURE: 135 MMHG

## 2023-09-13 DIAGNOSIS — G47.33 OBSTRUCTIVE SLEEP APNEA SYNDROME: Primary | ICD-10-CM

## 2023-09-13 DIAGNOSIS — Z78.9 NONSMOKER: ICD-10-CM

## 2023-09-13 DIAGNOSIS — R06.00 DYSPNEA, UNSPECIFIED TYPE: ICD-10-CM

## 2023-09-13 DIAGNOSIS — J45.30 MILD PERSISTENT ASTHMA WITHOUT COMPLICATION: ICD-10-CM

## 2023-09-13 PROCEDURE — 99214 OFFICE O/P EST MOD 30 MIN: CPT | Mod: S$GLB,,, | Performed by: INTERNAL MEDICINE

## 2023-09-13 PROCEDURE — 99214 PR OFFICE/OUTPT VISIT, EST, LEVL IV, 30-39 MIN: ICD-10-PCS | Mod: S$GLB,,, | Performed by: INTERNAL MEDICINE

## 2023-09-13 RX ORDER — LEVALBUTEROL INHALATION SOLUTION 0.63 MG/3ML
SOLUTION RESPIRATORY (INHALATION)
Qty: 75 ML | Refills: 11 | Status: SHIPPED | OUTPATIENT
Start: 2023-09-13

## 2023-09-13 RX ORDER — ALBUTEROL SULFATE 90 UG/1
2 AEROSOL, METERED RESPIRATORY (INHALATION) EVERY 6 HOURS PRN
Qty: 18 G | Refills: 11 | Status: SHIPPED | OUTPATIENT
Start: 2023-09-13

## 2023-09-13 NOTE — PROGRESS NOTES
HPI:    7/25/2017 - Here for follow up, overall doing well, no recent problems with her asthma, reports good compliance and benefit with her CPAP.    Pt asthma is currently stable with no increases in SOB, SOARES or wheezing.  There has been no increase in use of  rescue medications.  Have reviewed medications with pt including the roles of rescue and controlling medications.  All questions answered.  Pt reports no problems with technique with inhalers.  We discussed the possibility of de-escalation of therapy if asthma control remains stable.    ACT - 20    6/20/2019 - Here for follow up, Pt asthma is currently stable with no increases in SOB, SOARES or wheezing.  There has been no increase in use of  rescue medications.  Have reviewed medications with pt including the roles of rescue and controlling medications.  All questions answered.  Pt reports no problems with technique with inhalers.  We discussed the possibility of de-escalation of therapy if asthma control remains stable.  Has not been in hospital since the last time I saw her.  Going on a cruise in August.  Has gained about 21# (d/w her)     ACT - 20    11/27/2019 - Over last 3 weeks has had progressive SOB and having trouble with desaturations while awake.  Came to office for evaluation and sats were 79% on RA, no fever, chills, sweats, has not had much cough or congestion.  She reports that she has been using her CPAP regularly but still has somnolence.  SOB much worse with minimal exertion.  No chest pain of any type.  No increase in fluid retention or change in weight, some swelling of hands.    5/4/2020 - Virtual Visit    The chief complaint leading to consultation is: follow up  The patient location is:  home  Visit type: Virtual visit with synchronous audio and video    Here for follow up - last visit was 6 months ago - she was admitted and treated and went home and was told to increase CPAP to 15 but she may need BiPAP - she needs to have a repeat  titration study.  She is concerned about going in for the study because of the Covid issues.  Her breathing has been a little more labored.  Has some chronic SOB, SOARES and reports using O2 at 2-3 LPM.  No recent fever, chills, sweats, cough, sputum.    I have discussed the limitations of a virtual visit with the patient including the fact that there are no vital signs and no way to fully examine the patient.  This could lead to misdiagnosis and possibly to delays in appropriate care.    The physical exam in this note is pulled forward from prior visits.  It will be updated based on any findings which I can discern based on seeing the patient on a video screen.  I will not eliminate findings from prior exams at this visit.    6/18/2020 - Here for follow up, has had trouble with increased SOB, SOARES.  She was sent home from the hospital ([facility]) in November and was told she needed BiPAP.  We have been working on restudying her - she had home sleep study last night - report not available yet.  No other symptoms but her dyspnea is clearly worse than before.  O2 sats at home have been 88% and above for the most part on room air.  No CHF symptoms.    6/19/2020 - HST shows sleep apnea (KATHY 6) but with severe desaturations (50's sats) - will order titration study.    3/18/2021 - Here for follow up, overall doing well.  She needs to have a concentrator removed from home (has 2 for some reason and doesn't want to keep reserve O2 cylinder).  Breathing has been at her baseline.  Patient has no known corona virus exposures and has been practicing social distancing.  We have discussed the virus and precautions and all questions have been answered.     3/3/2022 - Here for follow up, Patient is here for follow up visit for sleep apnea and therapy.  They report no issues with their machine.  They report good compliance with the therapy and feel that they are benefiting from it.  Discussed alternative therapies/options as appropriate.   Discussed diet, weight and exercise as appropriate.  All questions answered.   Breathing has been OK.  She is planning a trip to [place] for a wedding soon.  Patient has no known corona virus exposures and has been practicing social distancing.  We have discussed the virus and precautions and all questions have been answered.  She has had Covid vaccine (Pfizer) and booster.    9/13/2023 - Here for follow up, Patient is here for follow up visit for sleep apnea and therapy.  They report no issues with their machine.  They report good compliance with the therapy and feel that they are benefiting from it.  Discussed alternative therapies/options as appropriate.  Discussed diet, weight and exercise as appropriate.  All questions answered.  Breathing has been OK.  She did have a fall with wrist injury.  Has lost 28# since her last visit.        CPAP Therapy    CPAP therapy - 16 + 3 LPM    Date of sleep study - 6/2020 RDI - 6 (HST)    Pt reports good compliance and benefit with the therapy.    Face to face visit with pt concerning their CPAP therapy.  I have stressed continued compliance with the treatment and all questions were answered.    Home Oxygen 2 - 3 LPM uses qhs and prn    Face to face visit with pt regarding their home oxygen.  We have discussed the need for oxygen including continuous use, prn use or night time use (as appropriate for the pt).  We discussed the need for compliance with the therapy. We will do recertifications as necessary.     Nebulizer    Face to face visit regarding home nebulizer use.    I have discussed with the pt regarding the need for home nebulizer therapy.  I have stressed the need for compliance with the therapy and the need to let me know if there are any issues with the medication.  All questions answered.      Medications    Current Outpatient Medications:     albuterol (PROAIR HFA) 90 mcg/actuation inhaler, Inhale 2 puffs into the lungs every 6 (six) hours as needed for Wheezing.  Rescue, Disp: 18 g, Rfl: 11    apixaban (ELIQUIS) 5 mg Tab, Take 1 tablet (5 mg total) by mouth 2 (two) times daily., Disp: 30 tablet, Rfl: 0    aspirin (ASPIR-81 ORAL), Take 1 tablet by mouth once daily., Disp: , Rfl:     atorvastatin (LIPITOR) 20 MG tablet, Take 20 mg by mouth once daily. , Disp: , Rfl: 3    BD INSULIN SYRINGE ULTRA-FINE 0.5 mL 31 gauge x 5/16 Syrg, USE TO INJECT 4X DAILY, Disp: , Rfl: 1    blood sugar diagnostic (FREESTYLE LITE STRIPS MISC), FreeStyle Lite Strips  USE UTD QID, Disp: , Rfl:     cholecalciferol, vitamin D3, 1,250 mcg (50,000 unit) capsule, Take 1 each by mouth twice a week. Monday and Tuesdays, Disp: , Rfl:     ergocalciferol (ERGOCALCIFEROL) 50,000 unit Cap, TK 1 C PO 2 TIMES A WK, Disp: , Rfl:     fenofibrate 40 mg Tab, fenofibrate 40 mg tablet  TAKE 1 TABLET BY MOUTH DAILY, Disp: , Rfl:     ferrous sulfate (FEOSOL) 325 mg (65 mg iron) Tab tablet, TK 1 T PO BID, Disp: , Rfl:     fluconazole (DIFLUCAN) 150 MG Tab, fluconazole 150 mg tablet  TAKE 1 TABLET BY MOUTH EVERY DAY, Disp: , Rfl:     fluticasone-salmeterol diskus inhaler 250-50 mcg, INHALE ONE PUFF INTO THE LUNGS TWICE DAILY, Disp: 1 each, Rfl: 11    FREESTYLE INSULINX TEST STRIPS Strp, TEST BLOOD SUGAR QID, Disp: , Rfl: 3    FREESTYLE LANCETS 28 gauge lancets, TEST QID, Disp: , Rfl: 3    gabapentin (NEURONTIN) 300 MG capsule, Take 600 mg by mouth 2 (two) times daily., Disp: , Rfl:     glimepiride (AMARYL) 4 MG tablet, Take 4 mg by mouth before breakfast., Disp: , Rfl:     hyaluronate sod, cross-linked (GEL-ONE) 30 mg/3 mL, 3 mLs., Disp: , Rfl:     hyaluronate sodium, stabilized (DUROLANE) 60 mg/3 mL, Durolane 60 mg/3 mL intra-articular syringe, Disp: , Rfl:     hydrochlorothiazide (HYDRODIURIL) 25 MG tablet, Take 25 mg by mouth once daily. , Disp: , Rfl: 3    hylan g-f 20 (SYNVISC ONE) 48 mg/6 mL Syrg, 6 mLs., Disp: , Rfl:     insulin aspart U-100 (NOVOLOG) 100 unit/mL (3 mL) InPn pen, Inject 25 Units into the skin 3  "(three) times daily. Pt states sliding scale, Disp: , Rfl:     insulin glargine 100 units/mL (3mL) SubQ pen, Inject 35 Units into the skin once daily. , Disp: , Rfl:     insulin syringe-needle U-100 0.5 mL 31 gauge x 5/16" Syrg, BD Insulin Syringe Ultra-Fine 0.5 mL 31 gauge x 5/16", Disp: , Rfl:     irbesartan (AVAPRO) 300 MG tablet, Take 300 mg by mouth once daily. , Disp: , Rfl: 3    JARDIANCE 10 mg tablet, , Disp: , Rfl:     lancets 28 gauge Misc, FreeStyle Lancets 28 gauge, Disp: , Rfl:     levalbuterol (XOPENEX) 0.63 mg/3 mL nebulizer solution, USE 1 VIAL VIA NEBULIZER THREE TIMES DAILY AS NEEDED FOR WHEEZING, Disp: 75 mL, Rfl: 11    levothyroxine (SYNTHROID) 25 MCG tablet, Take 25 mcg by mouth once daily. , Disp: , Rfl: 3    liraglutide 0.6 mg/0.1 mL, 18 mg/3 mL, subq PNIJ (VICTOZA 2-KAM) 0.6 mg/0.1 mL (18 mg/3 mL) PnIj pen, Inject 1.8 mg into the skin once daily. , Disp: , Rfl:     magnesium oxide (MAG-OX) 400 mg (241.3 mg magnesium) tablet, Take 400 mg by mouth once daily., Disp: , Rfl:     meloxicam (MOBIC) 15 MG tablet, meloxicam 15 mg tablet, Disp: , Rfl:     metformin (GLUCOPHAGE) 500 MG tablet, Take 500 mg by mouth 2 (two) times daily with meals. , Disp: , Rfl: 3    methocarbamoL (ROBAXIN) 750 MG Tab, Take 750 mg by mouth 3 (three) times daily., Disp: , Rfl:     omeprazole (PRILOSEC) 20 MG capsule, Take 20 mg by mouth once daily. , Disp: , Rfl: 1    pen needle, diabetic 32 gauge x 5/32" Ndle, BD Ultra-Fine Whit Pen Needle 32 gauge x 5/32", Disp: , Rfl:     sodium hyaluronate, orthovisc, 30 mg/2 mL, ORTHOVISC 30 mg/2 mL intra-articular syringe, Disp: , Rfl:     VASCEPA 1 gram Cap, Take 1 capsule by mouth 2 (two) times daily., Disp: , Rfl:     amoxicillin (AMOXIL) 500 MG capsule, Take 500 mg by mouth 3 (three) times daily., Disp: , Rfl:     diltiaZEM (CARDIZEM CD) 120 MG Cp24, Take 1 capsule (120 mg total) by mouth once daily., Disp: 30 capsule, Rfl: 0    Allergies  Review of patient's allergies " indicates:   Allergen Reactions    Benadryl allergy decongestant     Codeine        Social History    Social History     Tobacco Use   Smoking Status Never   Smokeless Tobacco Never     ETOH - 1-2 drinks per week.  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   HENT:  Negative for congestion.    Eyes:  Negative for pain.   Respiratory:  Negative for cough, hemoptysis, sputum production, shortness of breath, wheezing and stridor.    Cardiovascular:  Negative for chest pain, palpitations, orthopnea, claudication, leg swelling and PND.   Gastrointestinal:  Negative for abdominal pain, constipation, diarrhea, heartburn, nausea and vomiting.   Genitourinary:  Negative for dysuria, frequency and urgency.   Musculoskeletal:  Negative for falls and myalgias.   Neurological:  Negative for sensory change, focal weakness and weakness.   Psychiatric/Behavioral:  Negative for depression. The patient is not nervous/anxious.        Physical Exam    Vitals:    09/13/23 1146   BP: 135/80   BP Location: Left arm   Patient Position: Sitting   BP Method: Medium (Manual)   Pulse: 90   SpO2: (!) 94%   Weight: 110.2 kg (243 lb)       Physical Exam  Vitals and nursing note reviewed.   Constitutional:       General: She is not in acute distress.     Appearance: She is well-developed. She is not diaphoretic.      Comments: obese   HENT:      Head: Normocephalic and atraumatic.      Right Ear: External ear normal.      Left Ear: External ear normal.      Nose: Nose normal.   Eyes:      Conjunctiva/sclera: Conjunctivae normal.      Pupils: Pupils are equal, round, and reactive to  light.   Neck:      Thyroid: No thyromegaly.      Vascular: No JVD.      Trachea: No tracheal deviation.   Cardiovascular:      Rate and Rhythm: Normal rate and regular rhythm.      Heart sounds: Murmur heard.      No friction rub. No gallop.      Comments: 2/6 REMIGIO  Pulmonary:      Effort: Pulmonary effort is normal. No respiratory distress.      Breath sounds: Normal breath sounds. No stridor. No wheezing or rales.   Chest:      Chest wall: No tenderness.   Abdominal:      General: Bowel sounds are normal. There is no distension.      Palpations: Abdomen is soft.      Tenderness: There is no abdominal tenderness.   Musculoskeletal:         General: No tenderness. Normal range of motion.      Cervical back: Normal range of motion and neck supple.   Lymphadenopathy:      Cervical: No cervical adenopathy.   Skin:     General: Skin is warm and dry.   Neurological:      Mental Status: She is alert and oriented to person, place, and time.      Cranial Nerves: No cranial nerve deficit.   Psychiatric:         Behavior: Behavior normal.         Lab      Xray      Impression/Plan  Problem List Items Addressed This Visit          Neuro    Obstructive sleep apnea syndrome  Continue present PAP therapy  Pt to call if they have any trouble with their machines  Discussed with pt about signs and symptoms to watch for  Will refill supplies as needed  Have encouraged pt to continue to work on diet, weight loss and exercise         Pulmonary    Chronic respiratory failure with hypoxia   seems to be doing OK at home        Mild persistent asthma without complication  Continue present medications.  Will refill medications as needed.  Instructed patient to contact us with any issues concerning their medications (cost, reactions, etc.).  Have discussed with patient about inciting conditions which may exacerbate their disease.  We did discuss possible new therapies or de-escalation of therapy (if appropriate).  Discussed the possibility  of looking into alternate therapies and/or steroid sparing options  Discussed whether further evaluation of allergies would be warranted  Discussed whether reflux could be playing a role in their symptoms  All questions answered  RTC 12 months  Patient instructed that they are to call if symptoms change or new issues develop prior to their next visit.      Dyspnea   improved but still present    DM   follows with her primary MD    HTN   reports good control at home   told to watch closely    Heart Murmur   ECHO 8/2019 was OK  may need repeat in the future                   Fluids/Electrolytes/Nutrition/GI    Morbid obesity   Discussed with pt at length about the need to work on diet and weight loss.  Have offered suggestions on approaches for this problem.  Also discussed the need to start a regular exercise program.  We discussed at length the importance of regular exercise and working at getting to a healthier weight.  All questions answered.  Pt voices understanding of these principles and hopefully will take action.       Other    Never smoker          Other Visit Diagnoses    None.         Percy Guzmán MD

## 2023-12-19 ENCOUNTER — TELEPHONE (OUTPATIENT)
Dept: PULMONOLOGY | Facility: CLINIC | Age: 63
End: 2023-12-19

## 2023-12-19 RX ORDER — AZITHROMYCIN 250 MG/1
TABLET, FILM COATED ORAL
Qty: 6 TABLET | Refills: 0 | Status: SHIPPED | OUTPATIENT
Start: 2023-12-19 | End: 2024-01-09 | Stop reason: SDUPTHER

## 2023-12-19 NOTE — TELEPHONE ENCOUNTER
For the past 6 days had some nasal congestion an a cough . She wants to know if she can have a antibiotics she doesn't want to turn into pneumonia .

## 2024-01-08 ENCOUNTER — TELEPHONE (OUTPATIENT)
Dept: PULMONOLOGY | Facility: CLINIC | Age: 64
End: 2024-01-08

## 2024-01-08 NOTE — TELEPHONE ENCOUNTER
Patient called said she has a cough and voice is hoarse, no other symptoms.  She wants a antibiotic called into [facility]  Please advise

## 2024-01-09 RX ORDER — AZITHROMYCIN 250 MG/1
TABLET, FILM COATED ORAL
Qty: 6 TABLET | Refills: 0 | Status: SHIPPED | OUTPATIENT
Start: 2024-01-09

## 2024-01-26 ENCOUNTER — TELEPHONE (OUTPATIENT)
Dept: PULMONOLOGY | Facility: CLINIC | Age: 64
End: 2024-01-26

## 2024-01-26 DIAGNOSIS — G47.33 OBSTRUCTIVE SLEEP APNEA SYNDROME: Primary | ICD-10-CM

## 2024-01-26 NOTE — TELEPHONE ENCOUNTER
Patient called an states that she has a at home home concentrator that she got from a friend . Her concentrator went out . She said that she will need a home concentrator, supplies, an a portable one from [employer] . She will most likely need to do testing over it has been awhile . She would like to retest to make sure she still needs the oxygen an still needs it with her cpap  .

## 2024-04-07 ENCOUNTER — TELEPHONE (OUTPATIENT)
Dept: PULMONOLOGY | Facility: HOSPITAL | Age: 64
End: 2024-04-07

## 2024-04-07 DIAGNOSIS — I27.20 PULMONARY HYPERTENSION: ICD-10-CM

## 2024-04-07 DIAGNOSIS — G47.33 OBSTRUCTIVE SLEEP APNEA SYNDROME: Primary | ICD-10-CM

## 2024-04-08 ENCOUNTER — TELEPHONE (OUTPATIENT)
Dept: PULMONOLOGY | Facility: CLINIC | Age: 64
End: 2024-04-08

## 2024-04-08 DIAGNOSIS — G47.33 OBSTRUCTIVE SLEEP APNEA SYNDROME: Primary | ICD-10-CM

## 2024-04-08 NOTE — TELEPHONE ENCOUNTER
----- Message from Percy Guzmán MD sent at 4/7/2024  1:19 PM CDT -----  Order for O2 done, she needs to use it hwen sleeping with CPAP - tell her to let us know when she has the O2 because we will need to redo an overnigt study on CPAP and O2 to make sure the setting is adequate    ----- Message -----  From: Whit Min MA  Sent: 4/1/2024   8:19 AM CDT  To: Percy Guzmán MD      ----- Message -----  From: Yuli Bishop MA  Sent: 4/1/2024   7:13 AM CDT  To: Arielle Paredes

## 2024-04-08 NOTE — TELEPHONE ENCOUNTER
Spoke to patient an let her know the results of the overnight pulse ox. She said that she has oxygen an the adapter for her to hook up to the cpap . Please order her another test with overnight pulse ox to use the oxygen . Patient did ask me for her to get a portable concentrator but I don't think insurance is going to approve this because she only uses the oxygen at night .

## 2024-08-16 ENCOUNTER — HOSPITAL ENCOUNTER (OUTPATIENT)
Dept: RADIOLOGY | Facility: OTHER | Age: 64
Discharge: HOME | End: 2024-08-16
Attending: NURSE PRACTITIONER
Payer: COMMERCIAL

## 2024-08-16 DIAGNOSIS — Z12.31 ENCOUNTER FOR SCREENING MAMMOGRAM FOR BREAST CANCER: ICD-10-CM

## 2024-08-16 PROCEDURE — 77063 BREAST TOMOSYNTHESIS BI: CPT | Mod: TC

## 2024-08-16 PROCEDURE — 77067 SCR MAMMO BI INCL CAD: CPT | Mod: TC

## 2024-09-25 ENCOUNTER — OFFICE VISIT (OUTPATIENT)
Dept: PULMONOLOGY | Facility: CLINIC | Age: 64
End: 2024-09-25
Payer: COMMERCIAL

## 2024-09-25 VITALS
WEIGHT: 250.38 LBS | HEART RATE: 101 BPM | OXYGEN SATURATION: 93 % | DIASTOLIC BLOOD PRESSURE: 80 MMHG | BODY MASS INDEX: 45.8 KG/M2 | SYSTOLIC BLOOD PRESSURE: 145 MMHG

## 2024-09-25 DIAGNOSIS — G47.33 OBSTRUCTIVE SLEEP APNEA SYNDROME: Primary | ICD-10-CM

## 2024-09-25 DIAGNOSIS — Z78.9 NONSMOKER: ICD-10-CM

## 2024-09-25 DIAGNOSIS — E66.9 CLASS 2 OBESITY WITHOUT SERIOUS COMORBIDITY IN ADULT, UNSPECIFIED BMI, UNSPECIFIED OBESITY TYPE: ICD-10-CM

## 2024-09-25 DIAGNOSIS — J45.20 MILD INTERMITTENT ASTHMA WITHOUT COMPLICATION: ICD-10-CM

## 2024-09-25 PROBLEM — E66.812 CLASS 2 OBESITY WITHOUT SERIOUS COMORBIDITY IN ADULT: Status: ACTIVE | Noted: 2017-07-25

## 2024-09-25 PROCEDURE — 99214 OFFICE O/P EST MOD 30 MIN: CPT | Mod: S$GLB,,, | Performed by: INTERNAL MEDICINE

## 2024-09-25 NOTE — PROGRESS NOTES
HPI:    7/25/2017 - Here for follow up, overall doing well, no recent problems with her asthma, reports good compliance and benefit with her CPAP.    Pt asthma is currently stable with no increases in SOB, SOARES or wheezing.  There has been no increase in use of  rescue medications.  Have reviewed medications with pt including the roles of rescue and controlling medications.  All questions answered.  Pt reports no problems with technique with inhalers.  We discussed the possibility of de-escalation of therapy if asthma control remains stable.    ACT - 20    6/20/2019 - Here for follow up, Pt asthma is currently stable with no increases in SOB, SOARES or wheezing.  There has been no increase in use of  rescue medications.  Have reviewed medications with pt including the roles of rescue and controlling medications.  All questions answered.  Pt reports no problems with technique with inhalers.  We discussed the possibility of de-escalation of therapy if asthma control remains stable.  Has not been in hospital since the last time I saw her.  Going on a cruise in August.  Has gained about 21# (d/w her)     ACT - 20    11/27/2019 - Over last 3 weeks has had progressive SOB and having trouble with desaturations while awake.  Came to office for evaluation and sats were 79% on RA, no fever, chills, sweats, has not had much cough or congestion.  She reports that she has been using her CPAP regularly but still has somnolence.  SOB much worse with minimal exertion.  No chest pain of any type.  No increase in fluid retention or change in weight, some swelling of hands.    5/4/2020 - Virtual Visit    The chief complaint leading to consultation is: follow up  The patient location is:  home  Visit type: Virtual visit with synchronous audio and video    Here for follow up - last visit was 6 months ago - she was admitted and treated and went home and was told to increase CPAP to 15 but she may need BiPAP - she needs to have a repeat  titration study.  She is concerned about going in for the study because of the Covid issues.  Her breathing has been a little more labored.  Has some chronic SOB, SOARES and reports using O2 at 2-3 LPM.  No recent fever, chills, sweats, cough, sputum.    I have discussed the limitations of a virtual visit with the patient including the fact that there are no vital signs and no way to fully examine the patient.  This could lead to misdiagnosis and possibly to delays in appropriate care.    The physical exam in this note is pulled forward from prior visits.  It will be updated based on any findings which I can discern based on seeing the patient on a video screen.  I will not eliminate findings from prior exams at this visit.    6/18/2020 - Here for follow up, has had trouble with increased SOB, SOARES.  She was sent home from the hospital ([facility]) in November and was told she needed BiPAP.  We have been working on restudying her - she had home sleep study last night - report not available yet.  No other symptoms but her dyspnea is clearly worse than before.  O2 sats at home have been 88% and above for the most part on room air.  No CHF symptoms.    6/19/2020 - HST shows sleep apnea (KATHY 6) but with severe desaturations (50's sats) - will order titration study.    3/18/2021 - Here for follow up, overall doing well.  She needs to have a concentrator removed from home (has 2 for some reason and doesn't want to keep reserve O2 cylinder).  Breathing has been at her baseline.  Patient has no known corona virus exposures and has been practicing social distancing.  We have discussed the virus and precautions and all questions have been answered.     3/3/2022 - Here for follow up, Patient is here for follow up visit for sleep apnea and therapy.  They report no issues with their machine.  They report good compliance with the therapy and feel that they are benefiting from it.  Discussed alternative therapies/options as appropriate.   Discussed diet, weight and exercise as appropriate.  All questions answered.   Breathing has been OK.  She is planning a trip to [place] for a wedding soon.  Patient has no known corona virus exposures and has been practicing social distancing.  We have discussed the virus and precautions and all questions have been answered.  She has had Covid vaccine (Pfizer) and booster.    9/13/2023 - Here for follow up, Patient is here for follow up visit for sleep apnea and therapy.  They report no issues with their machine.  They report good compliance with the therapy and feel that they are benefiting from it.  Discussed alternative therapies/options as appropriate.  Discussed diet, weight and exercise as appropriate.  All questions answered.  Breathing has been OK.  She did have a fall with wrist injury.  Has lost 28# since her last visit.    9/25/2024 - Here for follow up visit. Patient is being seen for sleep apnea and therapy.  They report no issues with their machine.  They report good compliance with the therapy and feel that they are benefiting from it.  Discussed alternative therapies/options as appropriate.  Discussed diet, weight and exercise as appropriate.  All questions answered. Have reviewed compliance report if available, if report is not available at the time of the visit we have requested the report from the DME and will review when available.  She is interested in rying to get a portable concentrator to use with her CPAP when she travels.  Here for follow up visit.  Patient's asthma is currently stable with no increases in SOB, SOARES or wheezing.  There has been no increase in use of  rescue medications.  Have reviewed medications with pt including the roles of rescue and controlling medications.  All questions answered.  Pt reports no problems with technique with inhalers.  We discussed the possibility of de-escalation of therapy if asthma control remains stable.  Will consider biologic agents if  appropriate.            CPAP Therapy    CPAP therapy - 16 + 3 LPM    Date of sleep study - 6/2020 RDI - 6 (HST)    Pt reports good compliance and benefit with the therapy.    Face to face visit with pt concerning their CPAP therapy.  I have stressed continued compliance with the treatment and all questions were answered.    Home Oxygen 2 - 3 LPM uses qhs and prn    Face to face visit with pt regarding their home oxygen.  We have discussed the need for oxygen including continuous use, prn use or night time use (as appropriate for the pt).  We discussed the need for compliance with the therapy. We will do recertifications as necessary.     Nebulizer    Face to face visit regarding home nebulizer use.    I have discussed with the pt regarding the need for home nebulizer therapy.  I have stressed the need for compliance with the therapy and the need to let me know if there are any issues with the medication.  All questions answered.      Medications    Current Outpatient Medications:     albuterol (PROAIR HFA) 90 mcg/actuation inhaler, Inhale 2 puffs into the lungs every 6 (six) hours as needed for Wheezing. Rescue, Disp: 18 g, Rfl: 11    apixaban (ELIQUIS) 5 mg Tab, Take 1 tablet (5 mg total) by mouth 2 (two) times daily., Disp: 30 tablet, Rfl: 0    aspirin (ASPIR-81 ORAL), Take 1 tablet by mouth once daily., Disp: , Rfl:     atorvastatin (LIPITOR) 20 MG tablet, Take 20 mg by mouth once daily. , Disp: , Rfl: 3    BD INSULIN SYRINGE ULTRA-FINE 0.5 mL 31 gauge x 5/16 Syrg, USE TO INJECT 4X DAILY, Disp: , Rfl: 1    blood sugar diagnostic (FREESTYLE LITE STRIPS MISC), FreeStyle Lite Strips  USE UTD QID, Disp: , Rfl:     cholecalciferol, vitamin D3, 1,250 mcg (50,000 unit) capsule, Take 1 each by mouth twice a week. Monday and Tuesdays, Disp: , Rfl:     ergocalciferol (ERGOCALCIFEROL) 50,000 unit Cap, TK 1 C PO 2 TIMES A WK, Disp: , Rfl:     fenofibrate 40 mg Tab, fenofibrate 40 mg tablet  TAKE 1 TABLET BY MOUTH DAILY,  "Disp: , Rfl:     ferrous sulfate (FEOSOL) 325 mg (65 mg iron) Tab tablet, TK 1 T PO BID, Disp: , Rfl:     fluconazole (DIFLUCAN) 150 MG Tab, fluconazole 150 mg tablet  TAKE 1 TABLET BY MOUTH EVERY DAY, Disp: , Rfl:     fluticasone-salmeterol diskus inhaler 250-50 mcg, INHALE ONE PUFF INTO THE LUNGS TWICE DAILY, Disp: 1 each, Rfl: 11    FREESTYLE INSULINX TEST STRIPS Strp, TEST BLOOD SUGAR QID, Disp: , Rfl: 3    FREESTYLE LANCETS 28 gauge lancets, TEST QID, Disp: , Rfl: 3    gabapentin (NEURONTIN) 300 MG capsule, Take 600 mg by mouth 2 (two) times daily., Disp: , Rfl:     glimepiride (AMARYL) 4 MG tablet, Take 4 mg by mouth before breakfast., Disp: , Rfl:     hyaluronate sod, cross-linked (GEL-ONE) 30 mg/3 mL, 3 mLs., Disp: , Rfl:     hyaluronate sodium, stabilized (DUROLANE) 60 mg/3 mL, Durolane 60 mg/3 mL intra-articular syringe, Disp: , Rfl:     hydrochlorothiazide (HYDRODIURIL) 25 MG tablet, Take 25 mg by mouth once daily. , Disp: , Rfl: 3    hylan g-f 20 (SYNVISC ONE) 48 mg/6 mL Syrg, 6 mLs., Disp: , Rfl:     insulin aspart U-100 (NOVOLOG) 100 unit/mL (3 mL) InPn pen, Inject 25 Units into the skin 3 (three) times daily. Pt states sliding scale, Disp: , Rfl:     insulin glargine 100 units/mL (3mL) SubQ pen, Inject 35 Units into the skin once daily. , Disp: , Rfl:     insulin syringe-needle U-100 0.5 mL 31 gauge x 5/16" Syrg, BD Insulin Syringe Ultra-Fine 0.5 mL 31 gauge x 5/16", Disp: , Rfl:     irbesartan (AVAPRO) 300 MG tablet, Take 300 mg by mouth once daily. , Disp: , Rfl: 3    JARDIANCE 10 mg tablet, , Disp: , Rfl:     lancets 28 gauge Misc, FreeStyle Lancets 28 gauge, Disp: , Rfl:     levalbuterol (XOPENEX) 0.63 mg/3 mL nebulizer solution, USE 1 VIAL VIA NEBULIZER THREE TIMES DAILY AS NEEDED FOR WHEEZING, Disp: 75 mL, Rfl: 11    levothyroxine (SYNTHROID) 25 MCG tablet, Take 25 mcg by mouth once daily. , Disp: , Rfl: 3    liraglutide 0.6 mg/0.1 mL, 18 mg/3 mL, subq PNIJ (VICTOZA 2-KAM) 0.6 mg/0.1 mL (18 mg/3 " "mL) PnIj pen, Inject 1.8 mg into the skin once daily. , Disp: , Rfl:     magnesium oxide (MAG-OX) 400 mg (241.3 mg magnesium) tablet, Take 400 mg by mouth once daily., Disp: , Rfl:     meloxicam (MOBIC) 15 MG tablet, meloxicam 15 mg tablet, Disp: , Rfl:     metformin (GLUCOPHAGE) 500 MG tablet, Take 500 mg by mouth 2 (two) times daily with meals. , Disp: , Rfl: 3    methocarbamoL (ROBAXIN) 750 MG Tab, Take 750 mg by mouth 3 (three) times daily., Disp: , Rfl:     omeprazole (PRILOSEC) 20 MG capsule, Take 20 mg by mouth once daily. , Disp: , Rfl: 1    OZEMPIC 0.25 mg or 0.5 mg (2 mg/3 mL) pen injector, 0.5MG Subcutaneous WEEKLY for 30 days, Disp: , Rfl:     pen needle, diabetic 32 gauge x 5/32" Ndle, BD Ultra-Fine Whit Pen Needle 32 gauge x 5/32", Disp: , Rfl:     sodium hyaluronate, orthovisc, 30 mg/2 mL, ORTHOVISC 30 mg/2 mL intra-articular syringe, Disp: , Rfl:     SUTAB 1.479-0.188- 0.225 gram tablet, SMARTSI Tablet(s) By Mouth As Directed, Disp: , Rfl:     tirzepatide (MOUNJARO) 2.5 mg/0.5 mL PnIj, 2.5MG SUBCUTANEOUS WEEKLY 30 DAYS, Disp: , Rfl:     triamcinolone acetonide 0.1% (KENALOG) 0.1 % cream, 2 (two) times daily. Apply to affected area, Disp: , Rfl:     VASCEPA 1 gram Cap, Take 1 capsule by mouth 2 (two) times daily., Disp: , Rfl:     amoxicillin (AMOXIL) 500 MG capsule, Take 500 mg by mouth 3 (three) times daily. (Patient not taking: Reported on 2024), Disp: , Rfl:     azithromycin (ZITHROMAX Z-KAM) 250 MG tablet, Take 2 po today then 1 a day for 4 days (Patient not taking: Reported on 2024), Disp: 6 tablet, Rfl: 0    azithromycin (ZITHROMAX Z-KAM) 250 MG tablet, Take 2 po today then 1 a day for 4 days (Patient not taking: Reported on 2024), Disp: 6 tablet, Rfl: 0    diltiaZEM (CARDIZEM CD) 120 MG Cp24, Take 1 capsule (120 mg total) by mouth once daily., Disp: 30 capsule, Rfl: 0    Allergies  Review of patient's allergies indicates:   Allergen Reactions    Benadryl allergy decongestant "     Codeine        Social History    Social History     Tobacco Use   Smoking Status Never   Smokeless Tobacco Never     ETOH - 1-2 drinks per week.  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   HENT:  Negative for congestion.    Eyes:  Negative for pain.   Respiratory:  Negative for cough, hemoptysis, sputum production, shortness of breath, wheezing and stridor.    Cardiovascular:  Negative for chest pain, palpitations, orthopnea, claudication, leg swelling and PND.   Gastrointestinal:  Negative for abdominal pain, constipation, diarrhea, heartburn, nausea and vomiting.   Genitourinary:  Negative for dysuria, frequency and urgency.   Musculoskeletal:  Negative for falls and myalgias.   Neurological:  Negative for sensory change, focal weakness and weakness.   Psychiatric/Behavioral:  Negative for depression. The patient is not nervous/anxious.        Physical Exam    Vitals:    09/25/24 1413   BP: (!) 145/80   BP Location: Left arm   Patient Position: Sitting   BP Method: Medium (Manual)   Pulse: 101   SpO2: (!) 93%   Weight: 113.6 kg (250 lb 6.4 oz)       Physical Exam  Vitals and nursing note reviewed.   Constitutional:       General: She is not in acute distress.     Appearance: She is well-developed. She is not diaphoretic.      Comments: obese   HENT:      Head: Normocephalic and atraumatic.      Right Ear: External ear normal.      Left Ear: External ear normal.      Nose: Nose normal.   Eyes:      Conjunctiva/sclera: Conjunctivae normal.      Pupils: Pupils are equal, round, and reactive to light.   Neck:      Thyroid: No thyromegaly.       Vascular: No JVD.      Trachea: No tracheal deviation.   Cardiovascular:      Rate and Rhythm: Normal rate and regular rhythm.      Heart sounds: Murmur heard.      No friction rub. No gallop.      Comments: 2/6 REMIGIO  Pulmonary:      Effort: Pulmonary effort is normal. No respiratory distress.      Breath sounds: Normal breath sounds. No stridor. No wheezing or rales.   Chest:      Chest wall: No tenderness.   Abdominal:      General: Bowel sounds are normal. There is no distension.      Palpations: Abdomen is soft.      Tenderness: There is no abdominal tenderness.   Musculoskeletal:         General: No tenderness. Normal range of motion.      Cervical back: Normal range of motion and neck supple.   Lymphadenopathy:      Cervical: No cervical adenopathy.   Skin:     General: Skin is warm and dry.   Neurological:      Mental Status: She is alert and oriented to person, place, and time.      Cranial Nerves: No cranial nerve deficit.   Psychiatric:         Behavior: Behavior normal.         Lab      Xray      Impression/Plan  Problem List Items Addressed This Visit          Neuro    Obstructive sleep apnea syndrome  Continue present PAP therapy  Pt to call if they have any trouble with their machines  Discussed with pt about signs and symptoms to watch for  Will refill supplies as needed  Have encouraged pt to continue to work on diet, weight loss and exercise         Pulmonary    Chronic respiratory failure with hypoxia   seems to be doing OK at home        Mild persistent asthma without complication  Continue present medications.  Will refill medications as needed.  Instructed patient to contact us with any issues concerning their medications (cost, reactions, etc.).  Have discussed with patient about inciting conditions which may exacerbate their disease.  We did discuss possible new therapies or de-escalation of therapy (if appropriate).  Discussed the possibility of looking into alternate therapies and/or steroid  sparing options  Discussed whether further evaluation of allergies would be warranted  Discussed whether reflux could be playing a role in their symptoms  All questions answered  RTC 12 months  Patient instructed that they are to call if symptoms change or new issues develop prior to their next visit.      Dyspnea   better    DM   follows with her primary MD    HTN   reports good control at home   told to watch closely    Heart Murmur   ECHO 8/2019 was OK  may need repeat in the future                   Fluids/Electrolytes/Nutrition/GI    Morbid obesity   Discussed with pt at length about the need to work on diet and weight loss.  Have offered suggestions on approaches for this problem.  Also discussed the need to start a regular exercise program.  We discussed at length the importance of regular exercise and working at getting to a healthier weight.  All questions answered.  Pt voices understanding of these principles and hopefully will take action.  She has been started on ozempic       Other    Never smoker          Other Visit Diagnoses    None.         Percy Guzmán MD

## 2024-11-08 ENCOUNTER — OFFICE VISIT (OUTPATIENT)
Dept: OBSTETRICS AND GYNECOLOGY | Facility: CLINIC | Age: 64
End: 2024-11-08
Payer: COMMERCIAL

## 2024-11-08 VITALS
WEIGHT: 267.19 LBS | BODY MASS INDEX: 49.17 KG/M2 | SYSTOLIC BLOOD PRESSURE: 160 MMHG | HEIGHT: 62 IN | DIASTOLIC BLOOD PRESSURE: 92 MMHG

## 2024-11-08 DIAGNOSIS — R21 RASH: ICD-10-CM

## 2024-11-08 DIAGNOSIS — D12.7 ADENOMA DETERMINED BY COLORECTAL BIOPSY: ICD-10-CM

## 2024-11-08 DIAGNOSIS — Z01.419 ENCOUNTER FOR ANNUAL ROUTINE GYNECOLOGICAL EXAMINATION: Primary | ICD-10-CM

## 2024-11-08 PROCEDURE — 99999 PR PBB SHADOW E&M-EST. PATIENT-LVL V: CPT | Mod: PBBFAC,,, | Performed by: OBSTETRICS & GYNECOLOGY

## 2024-11-08 PROCEDURE — 99396 PREV VISIT EST AGE 40-64: CPT | Mod: S$GLB,,, | Performed by: OBSTETRICS & GYNECOLOGY

## 2024-11-08 RX ORDER — NYSTATIN 100000 U/G
OINTMENT TOPICAL 2 TIMES DAILY
Qty: 30 G | Refills: 3 | Status: SHIPPED | OUTPATIENT
Start: 2024-11-08

## 2024-11-11 NOTE — PROGRESS NOTES
"Chief Complaint: Well Woman Exam     HPI:      Liset Bailon is a 64 y.o.  who presents today for well woman exam.  LMP: No LMP recorded (lmp unknown). Patient has had a hysterectomy.  No issues, problems, or complaints. Specifically, patient denies abnormal vaginal bleeding, discharge, pelvic pain, urinary problems, or changes in appetite. Ms. Bailon is not currently sexually active.     Previous Pap: Hysterectomy for benign indications, has never had an abnormal pap  Previous Mammogram: BiRads: 1 T-C Score: 5.32% (24)   Most Recent Dexa: Has not had one  Most Recent Colonoscopy:  High grade polyp  (), Repeat overdue    OB History          4    Para   3    Term   3            AB   1    Living   3         SAB   1    IAB        Ectopic        Multiple        Live Births   3               Physical Exam:      Physical Exam     BP (!) 160/92 (Patient Position: Sitting)   Ht 5' 2" (1.575 m)   Wt 121.2 kg (267 lb 3.2 oz)   LMP  (LMP Unknown)   BMI 48.87 kg/m²   Body mass index is 48.87 kg/m².     APPEARANCE: Well nourished, well developed, in no acute distress.  PSYCH: Appropriate mood and affect.  SKIN: No acne or hirsutism. Scaly red rash across both breasts and buttocks.  NECK: Neck symmetric without masses  NODES: No inguinal, axillary, or supraclavicular lymph node enlargement  ABDOMEN: Soft.  No tenderness or masses.    CARDIOVASCULAR: No edema of peripheral extremities  BREASTS: Symmetrical, no visible skin lesions. No palpable masses. No nipple discharge bilaterally.  PELVIC: Normal external genitalia without lesions.  Normal hair distribution.  Adequate perineal body, normal urethral meatus.  Vagina moist and smooth. Without lesions. Vagina without abnormal discharge.  Cervix without lesions, abnormal discharge, or tenderness.. No significant cystocele or rectocele.  Bimanual exam limited by habitus.      A female chaperone was present for the breast and pelvic exam. "     Assessment/Plan:     Encounter for annual routine gynecological examination    Rash  -     nystatin (MYCOSTATIN) ointment; Apply topically 2 (two) times daily.  Dispense: 30 g; Refill: 3    Adenoma determined by colorectal biopsy  -     Ambulatory referral/consult to Endo Procedure ; Future; Expected date: 11/12/2024      Follow up in about 1 year (around 11/8/2025) for Annual Exam.    Counseling:     Patient was counseled today on current ASCCP pap guidelines, the recommendation for yearly physical exams, safe driving habits, and breast self awareness. She is to see her PCP for other health maintenance.     Use of the [facility] Patient Portal discussed and encouraged during today's visit.

## 2025-02-05 ENCOUNTER — TELEPHONE (OUTPATIENT)
Dept: GASTROENTEROLOGY | Facility: CLINIC | Age: 65
End: 2025-02-05
Payer: COMMERCIAL

## 2025-07-11 ENCOUNTER — TELEPHONE (OUTPATIENT)
Dept: PULMONOLOGY | Facility: CLINIC | Age: 65
End: 2025-07-11
Payer: COMMERCIAL

## 2025-07-11 NOTE — TELEPHONE ENCOUNTER
Pt called balbina wants to have her  see us for a new patient appointmnet he gets short of breath a lot balbina wants to be seen he has never seen any [facility] dr she wants to know if he can be seen on the same day as her she is being seen on 09/25/25 at 130 we have a available sot open on 930 that day .